# Patient Record
Sex: MALE | Race: WHITE | Employment: OTHER | URBAN - NONMETROPOLITAN AREA
[De-identification: names, ages, dates, MRNs, and addresses within clinical notes are randomized per-mention and may not be internally consistent; named-entity substitution may affect disease eponyms.]

---

## 2017-03-02 ENCOUNTER — OFFICE VISIT (OUTPATIENT)
Dept: NEUROLOGY | Age: 77
End: 2017-03-02
Payer: MEDICARE

## 2017-03-02 VITALS
HEIGHT: 72 IN | DIASTOLIC BLOOD PRESSURE: 81 MMHG | HEART RATE: 57 BPM | WEIGHT: 218.25 LBS | BODY MASS INDEX: 29.56 KG/M2 | SYSTOLIC BLOOD PRESSURE: 162 MMHG | OXYGEN SATURATION: 95 %

## 2017-03-02 DIAGNOSIS — R25.1 TREMOR: ICD-10-CM

## 2017-03-02 DIAGNOSIS — R53.83 OTHER FATIGUE: ICD-10-CM

## 2017-03-02 DIAGNOSIS — R26.9 GAIT ABNORMALITY: ICD-10-CM

## 2017-03-02 PROCEDURE — 99214 OFFICE O/P EST MOD 30 MIN: CPT | Performed by: PSYCHIATRY & NEUROLOGY

## 2017-03-02 PROCEDURE — 1123F ACP DISCUSS/DSCN MKR DOCD: CPT | Performed by: PSYCHIATRY & NEUROLOGY

## 2017-03-02 PROCEDURE — 4040F PNEUMOC VAC/ADMIN/RCVD: CPT | Performed by: PSYCHIATRY & NEUROLOGY

## 2017-03-02 PROCEDURE — G8599 NO ASA/ANTIPLAT THER USE RNG: HCPCS | Performed by: PSYCHIATRY & NEUROLOGY

## 2017-03-02 PROCEDURE — G8420 CALC BMI NORM PARAMETERS: HCPCS | Performed by: PSYCHIATRY & NEUROLOGY

## 2017-03-02 PROCEDURE — G8427 DOCREV CUR MEDS BY ELIG CLIN: HCPCS | Performed by: PSYCHIATRY & NEUROLOGY

## 2017-03-02 PROCEDURE — 4004F PT TOBACCO SCREEN RCVD TLK: CPT | Performed by: PSYCHIATRY & NEUROLOGY

## 2017-03-02 PROCEDURE — G8484 FLU IMMUNIZE NO ADMIN: HCPCS | Performed by: PSYCHIATRY & NEUROLOGY

## 2017-03-02 RX ORDER — ESCITALOPRAM OXALATE 20 MG/1
TABLET ORAL
COMMUNITY
Start: 2017-02-12

## 2017-06-06 ENCOUNTER — OFFICE VISIT (OUTPATIENT)
Dept: NEUROLOGY | Age: 77
End: 2017-06-06
Payer: MEDICARE

## 2017-06-06 VITALS
BODY MASS INDEX: 29.05 KG/M2 | OXYGEN SATURATION: 94 % | HEART RATE: 61 BPM | DIASTOLIC BLOOD PRESSURE: 91 MMHG | WEIGHT: 214.5 LBS | HEIGHT: 72 IN | SYSTOLIC BLOOD PRESSURE: 189 MMHG

## 2017-06-06 DIAGNOSIS — R53.83 OTHER FATIGUE: ICD-10-CM

## 2017-06-06 DIAGNOSIS — R26.9 GAIT ABNORMALITY: ICD-10-CM

## 2017-06-06 DIAGNOSIS — R25.1 TREMOR: ICD-10-CM

## 2017-06-06 PROCEDURE — G8420 CALC BMI NORM PARAMETERS: HCPCS | Performed by: PSYCHIATRY & NEUROLOGY

## 2017-06-06 PROCEDURE — 4004F PT TOBACCO SCREEN RCVD TLK: CPT | Performed by: PSYCHIATRY & NEUROLOGY

## 2017-06-06 PROCEDURE — 4040F PNEUMOC VAC/ADMIN/RCVD: CPT | Performed by: PSYCHIATRY & NEUROLOGY

## 2017-06-06 PROCEDURE — G8599 NO ASA/ANTIPLAT THER USE RNG: HCPCS | Performed by: PSYCHIATRY & NEUROLOGY

## 2017-06-06 PROCEDURE — 1123F ACP DISCUSS/DSCN MKR DOCD: CPT | Performed by: PSYCHIATRY & NEUROLOGY

## 2017-06-06 PROCEDURE — 99214 OFFICE O/P EST MOD 30 MIN: CPT | Performed by: PSYCHIATRY & NEUROLOGY

## 2017-06-06 PROCEDURE — G8427 DOCREV CUR MEDS BY ELIG CLIN: HCPCS | Performed by: PSYCHIATRY & NEUROLOGY

## 2018-08-11 ENCOUNTER — APPOINTMENT (OUTPATIENT)
Dept: CT IMAGING | Facility: HOSPITAL | Age: 78
End: 2018-08-11

## 2018-08-11 ENCOUNTER — APPOINTMENT (OUTPATIENT)
Dept: GENERAL RADIOLOGY | Facility: HOSPITAL | Age: 78
End: 2018-08-11

## 2018-08-11 ENCOUNTER — HOSPITAL ENCOUNTER (INPATIENT)
Facility: HOSPITAL | Age: 78
LOS: 3 days | Discharge: NURSING, REHAB OR LONG-TERM CARE | End: 2018-08-14
Attending: FAMILY MEDICINE | Admitting: INTERNAL MEDICINE

## 2018-08-11 ENCOUNTER — APPOINTMENT (OUTPATIENT)
Dept: CARDIOLOGY | Facility: HOSPITAL | Age: 78
End: 2018-08-11

## 2018-08-11 ENCOUNTER — APPOINTMENT (OUTPATIENT)
Dept: ULTRASOUND IMAGING | Facility: HOSPITAL | Age: 78
End: 2018-08-11

## 2018-08-11 ENCOUNTER — APPOINTMENT (OUTPATIENT)
Dept: MRI IMAGING | Facility: HOSPITAL | Age: 78
End: 2018-08-11

## 2018-08-11 DIAGNOSIS — Z74.09 IMPAIRED MOBILITY: ICD-10-CM

## 2018-08-11 DIAGNOSIS — Z74.09 IMPAIRED MOBILITY AND ADLS: ICD-10-CM

## 2018-08-11 DIAGNOSIS — Z78.9 IMPAIRED MOBILITY AND ADLS: ICD-10-CM

## 2018-08-11 DIAGNOSIS — I63.9 CEREBROVASCULAR ACCIDENT (CVA), UNSPECIFIED MECHANISM (HCC): Primary | ICD-10-CM

## 2018-08-11 DIAGNOSIS — R13.12 OROPHARYNGEAL DYSPHAGIA: ICD-10-CM

## 2018-08-11 LAB
ALBUMIN SERPL-MCNC: 4.5 G/DL (ref 3.5–5)
ALBUMIN/GLOB SERPL: 1.5 G/DL (ref 1.1–2.5)
ALP SERPL-CCNC: 44 U/L (ref 24–120)
ALT SERPL W P-5'-P-CCNC: 15 U/L (ref 0–54)
ANION GAP SERPL CALCULATED.3IONS-SCNC: 14 MMOL/L (ref 4–13)
APTT PPP: 27.9 SECONDS (ref 24.1–34.8)
AST SERPL-CCNC: 28 U/L (ref 7–45)
BASOPHILS # BLD AUTO: 0.05 10*3/MM3 (ref 0–0.2)
BASOPHILS NFR BLD AUTO: 0.7 % (ref 0–2)
BH CV ECHO MEAS - AO MAX PG (FULL): 4.6 MMHG
BH CV ECHO MEAS - AO MAX PG: 9 MMHG
BH CV ECHO MEAS - AO MEAN PG (FULL): 3 MMHG
BH CV ECHO MEAS - AO MEAN PG: 6 MMHG
BH CV ECHO MEAS - AO ROOT AREA (BSA CORRECTED): 1.6
BH CV ECHO MEAS - AO ROOT AREA: 9.6 CM^2
BH CV ECHO MEAS - AO ROOT DIAM: 3.5 CM
BH CV ECHO MEAS - AO V2 MAX: 150 CM/SEC
BH CV ECHO MEAS - AO V2 MEAN: 117 CM/SEC
BH CV ECHO MEAS - AO V2 VTI: 41.5 CM
BH CV ECHO MEAS - AVA(I,A): 2.3 CM^2
BH CV ECHO MEAS - AVA(I,D): 2.3 CM^2
BH CV ECHO MEAS - AVA(V,A): 2.7 CM^2
BH CV ECHO MEAS - AVA(V,D): 2.7 CM^2
BH CV ECHO MEAS - BSA(HAYCOCK): 2.2 M^2
BH CV ECHO MEAS - BSA: 2.2 M^2
BH CV ECHO MEAS - BZI_BMI: 28.5 KILOGRAMS/M^2
BH CV ECHO MEAS - BZI_METRIC_HEIGHT: 182.9 CM
BH CV ECHO MEAS - BZI_METRIC_WEIGHT: 95.3 KG
BH CV ECHO MEAS - CONTRAST EF 4CH: 61.1 ML/M^2
BH CV ECHO MEAS - EDV(CUBED): 39.3 ML
BH CV ECHO MEAS - EDV(MOD-SP4): 77.8 ML
BH CV ECHO MEAS - EDV(TEICH): 47.4 ML
BH CV ECHO MEAS - EF(CUBED): 64.8 %
BH CV ECHO MEAS - EF(MOD-SP4): 61.1 %
BH CV ECHO MEAS - EF(TEICH): 57.5 %
BH CV ECHO MEAS - ESV(CUBED): 13.8 ML
BH CV ECHO MEAS - ESV(MOD-SP4): 30.3 ML
BH CV ECHO MEAS - ESV(TEICH): 20.2 ML
BH CV ECHO MEAS - FS: 29.4 %
BH CV ECHO MEAS - IVS/LVPW: 1
BH CV ECHO MEAS - IVSD: 1.4 CM
BH CV ECHO MEAS - LA DIMENSION: 4 CM
BH CV ECHO MEAS - LA/AO: 1.1
BH CV ECHO MEAS - LAT PEAK E' VEL: 4.7 CM/SEC
BH CV ECHO MEAS - LV DIASTOLIC VOL/BSA (35-75): 35.8 ML/M^2
BH CV ECHO MEAS - LV MASS(C)D: 166.2 GRAMS
BH CV ECHO MEAS - LV MASS(C)DI: 76.4 GRAMS/M^2
BH CV ECHO MEAS - LV MAX PG: 4.4 MMHG
BH CV ECHO MEAS - LV MEAN PG: 3 MMHG
BH CV ECHO MEAS - LV SYSTOLIC VOL/BSA (12-30): 13.9 ML/M^2
BH CV ECHO MEAS - LV V1 MAX: 105 CM/SEC
BH CV ECHO MEAS - LV V1 MEAN: 77.5 CM/SEC
BH CV ECHO MEAS - LV V1 VTI: 25.4 CM
BH CV ECHO MEAS - LVIDD: 3.4 CM
BH CV ECHO MEAS - LVIDS: 2.4 CM
BH CV ECHO MEAS - LVLD AP4: 7.2 CM
BH CV ECHO MEAS - LVLS AP4: 6.3 CM
BH CV ECHO MEAS - LVOT AREA (M): 3.8 CM^2
BH CV ECHO MEAS - LVOT AREA: 3.8 CM^2
BH CV ECHO MEAS - LVOT DIAM: 2.2 CM
BH CV ECHO MEAS - LVPWD: 1.4 CM
BH CV ECHO MEAS - MED PEAK E' VEL: 5.1 CM/SEC
BH CV ECHO MEAS - MV A MAX VEL: 95.5 CM/SEC
BH CV ECHO MEAS - MV DEC TIME: 0.36 SEC
BH CV ECHO MEAS - MV E MAX VEL: 67.3 CM/SEC
BH CV ECHO MEAS - MV E/A: 0.7
BH CV ECHO MEAS - RAP SYSTOLE: 10 MMHG
BH CV ECHO MEAS - RVSP: 33.8 MMHG
BH CV ECHO MEAS - SI(AO): 183.6 ML/M^2
BH CV ECHO MEAS - SI(CUBED): 11.7 ML/M^2
BH CV ECHO MEAS - SI(LVOT): 44.4 ML/M^2
BH CV ECHO MEAS - SI(MOD-SP4): 21.8 ML/M^2
BH CV ECHO MEAS - SI(TEICH): 12.5 ML/M^2
BH CV ECHO MEAS - SV(AO): 399.3 ML
BH CV ECHO MEAS - SV(CUBED): 25.5 ML
BH CV ECHO MEAS - SV(LVOT): 96.6 ML
BH CV ECHO MEAS - SV(MOD-SP4): 47.5 ML
BH CV ECHO MEAS - SV(TEICH): 27.3 ML
BH CV ECHO MEAS - TR MAX VEL: 244 CM/SEC
BH CV ECHO MEASUREMENTS AVERAGE E/E' RATIO: 13.73
BILIRUB SERPL-MCNC: 0.6 MG/DL (ref 0.1–1)
BUN BLD-MCNC: 26 MG/DL (ref 5–21)
BUN/CREAT SERPL: 17.6 (ref 7–25)
CALCIUM SPEC-SCNC: 9.8 MG/DL (ref 8.4–10.4)
CHLORIDE SERPL-SCNC: 103 MMOL/L (ref 98–110)
CO2 SERPL-SCNC: 22 MMOL/L (ref 24–31)
CREAT BLD-MCNC: 1.48 MG/DL (ref 0.5–1.4)
DEPRECATED RDW RBC AUTO: 43.7 FL (ref 40–54)
EOSINOPHIL # BLD AUTO: 0.13 10*3/MM3 (ref 0–0.7)
EOSINOPHIL NFR BLD AUTO: 1.8 % (ref 0–4)
ERYTHROCYTE [DISTWIDTH] IN BLOOD BY AUTOMATED COUNT: 12.9 % (ref 12–15)
GFR SERPL CREATININE-BSD FRML MDRD: 46 ML/MIN/1.73
GLOBULIN UR ELPH-MCNC: 3 GM/DL
GLUCOSE BLD-MCNC: 109 MG/DL (ref 70–100)
GLUCOSE BLDC GLUCOMTR-MCNC: 130 MG/DL (ref 70–130)
GLUCOSE BLDC GLUCOMTR-MCNC: 179 MG/DL (ref 70–130)
GLUCOSE BLDC GLUCOMTR-MCNC: 264 MG/DL (ref 70–130)
HCT VFR BLD AUTO: 42.5 % (ref 40–52)
HGB BLD-MCNC: 15.2 G/DL (ref 14–18)
HOLD SPECIMEN: NORMAL
HOLD SPECIMEN: NORMAL
IMM GRANULOCYTES # BLD: 0.04 10*3/MM3 (ref 0–0.03)
IMM GRANULOCYTES NFR BLD: 0.5 % (ref 0–5)
INR PPP: 0.92 (ref 0.91–1.09)
LEFT ATRIUM VOLUME INDEX: 21.5 ML/M2
LEFT ATRIUM VOLUME: 46.8 CM3
LYMPHOCYTES # BLD AUTO: 1.77 10*3/MM3 (ref 0.72–4.86)
LYMPHOCYTES NFR BLD AUTO: 24 % (ref 15–45)
MAGNESIUM SERPL-MCNC: 2 MG/DL (ref 1.4–2.2)
MAXIMAL PREDICTED HEART RATE: 140 BPM
MCH RBC QN AUTO: 33.1 PG (ref 28–32)
MCHC RBC AUTO-ENTMCNC: 35.8 G/DL (ref 33–36)
MCV RBC AUTO: 92.6 FL (ref 82–95)
MONOCYTES # BLD AUTO: 0.91 10*3/MM3 (ref 0.19–1.3)
MONOCYTES NFR BLD AUTO: 12.3 % (ref 4–12)
NEUTROPHILS # BLD AUTO: 4.49 10*3/MM3 (ref 1.87–8.4)
NEUTROPHILS NFR BLD AUTO: 60.7 % (ref 39–78)
NRBC BLD MANUAL-RTO: 0 /100 WBC (ref 0–0)
PHOSPHATE SERPL-MCNC: 3.8 MG/DL (ref 2.5–4.5)
PLATELET # BLD AUTO: 300 10*3/MM3 (ref 130–400)
PMV BLD AUTO: 10.1 FL (ref 6–12)
POTASSIUM BLD-SCNC: 4.1 MMOL/L (ref 3.5–5.3)
PROT SERPL-MCNC: 7.5 G/DL (ref 6.3–8.7)
PROTHROMBIN TIME: 12.6 SECONDS (ref 11.9–14.6)
RBC # BLD AUTO: 4.59 10*6/MM3 (ref 4.8–5.9)
SODIUM BLD-SCNC: 139 MMOL/L (ref 135–145)
STRESS TARGET HR: 119 BPM
TROPONIN I SERPL-MCNC: 0.01 NG/ML (ref 0–0.03)
TSH SERPL DL<=0.05 MIU/L-ACNC: 3.32 MIU/ML (ref 0.47–4.68)
WBC NRBC COR # BLD: 7.39 10*3/MM3 (ref 4.8–10.8)
WHOLE BLOOD HOLD SPECIMEN: NORMAL
WHOLE BLOOD HOLD SPECIMEN: NORMAL

## 2018-08-11 PROCEDURE — 84100 ASSAY OF PHOSPHORUS: CPT | Performed by: PSYCHIATRY & NEUROLOGY

## 2018-08-11 PROCEDURE — 93880 EXTRACRANIAL BILAT STUDY: CPT

## 2018-08-11 PROCEDURE — 93005 ELECTROCARDIOGRAM TRACING: CPT | Performed by: PHYSICIAN ASSISTANT

## 2018-08-11 PROCEDURE — 85610 PROTHROMBIN TIME: CPT | Performed by: PHYSICIAN ASSISTANT

## 2018-08-11 PROCEDURE — 99285 EMERGENCY DEPT VISIT HI MDM: CPT

## 2018-08-11 PROCEDURE — 93306 TTE W/DOPPLER COMPLETE: CPT

## 2018-08-11 PROCEDURE — 85025 COMPLETE CBC W/AUTO DIFF WBC: CPT | Performed by: PHYSICIAN ASSISTANT

## 2018-08-11 PROCEDURE — 93306 TTE W/DOPPLER COMPLETE: CPT | Performed by: INTERNAL MEDICINE

## 2018-08-11 PROCEDURE — 82962 GLUCOSE BLOOD TEST: CPT

## 2018-08-11 PROCEDURE — 81003 URINALYSIS AUTO W/O SCOPE: CPT | Performed by: PSYCHIATRY & NEUROLOGY

## 2018-08-11 PROCEDURE — G8997 SWALLOW GOAL STATUS: HCPCS | Performed by: SPEECH-LANGUAGE PATHOLOGIST

## 2018-08-11 PROCEDURE — G8996 SWALLOW CURRENT STATUS: HCPCS | Performed by: SPEECH-LANGUAGE PATHOLOGIST

## 2018-08-11 PROCEDURE — 63710000001 INSULIN LISPRO (HUMAN) PER 5 UNITS: Performed by: NURSE PRACTITIONER

## 2018-08-11 PROCEDURE — 84484 ASSAY OF TROPONIN QUANT: CPT | Performed by: PHYSICIAN ASSISTANT

## 2018-08-11 PROCEDURE — 93010 ELECTROCARDIOGRAM REPORT: CPT | Performed by: INTERNAL MEDICINE

## 2018-08-11 PROCEDURE — 84443 ASSAY THYROID STIM HORMONE: CPT | Performed by: NURSE PRACTITIONER

## 2018-08-11 PROCEDURE — 71045 X-RAY EXAM CHEST 1 VIEW: CPT

## 2018-08-11 PROCEDURE — 70450 CT HEAD/BRAIN W/O DYE: CPT

## 2018-08-11 PROCEDURE — 80053 COMPREHEN METABOLIC PANEL: CPT | Performed by: PHYSICIAN ASSISTANT

## 2018-08-11 PROCEDURE — 93880 EXTRACRANIAL BILAT STUDY: CPT | Performed by: SURGERY

## 2018-08-11 PROCEDURE — 85730 THROMBOPLASTIN TIME PARTIAL: CPT | Performed by: PHYSICIAN ASSISTANT

## 2018-08-11 PROCEDURE — 83735 ASSAY OF MAGNESIUM: CPT | Performed by: PSYCHIATRY & NEUROLOGY

## 2018-08-11 PROCEDURE — 92610 EVALUATE SWALLOWING FUNCTION: CPT | Performed by: SPEECH-LANGUAGE PATHOLOGIST

## 2018-08-11 PROCEDURE — 99222 1ST HOSP IP/OBS MODERATE 55: CPT | Performed by: PSYCHIATRY & NEUROLOGY

## 2018-08-11 PROCEDURE — 70551 MRI BRAIN STEM W/O DYE: CPT

## 2018-08-11 RX ORDER — TOPIRAMATE 25 MG/1
25 TABLET ORAL NIGHTLY
Status: DISCONTINUED | OUTPATIENT
Start: 2018-08-11 | End: 2018-08-11

## 2018-08-11 RX ORDER — FENOFIBRATE 160 MG/1
160 TABLET ORAL NIGHTLY
COMMUNITY

## 2018-08-11 RX ORDER — SODIUM CHLORIDE 9 MG/ML
125 INJECTION, SOLUTION INTRAVENOUS CONTINUOUS
Status: DISCONTINUED | OUTPATIENT
Start: 2018-08-11 | End: 2018-08-11

## 2018-08-11 RX ORDER — ENALAPRILAT 2.5 MG/2ML
0.62 INJECTION INTRAVENOUS EVERY 6 HOURS PRN
Status: DISCONTINUED | OUTPATIENT
Start: 2018-08-11 | End: 2018-08-14 | Stop reason: HOSPADM

## 2018-08-11 RX ORDER — TRAZODONE HYDROCHLORIDE 150 MG/1
150 TABLET ORAL NIGHTLY
COMMUNITY

## 2018-08-11 RX ORDER — PANTOPRAZOLE SODIUM 40 MG/1
40 TABLET, DELAYED RELEASE ORAL DAILY
Status: DISCONTINUED | OUTPATIENT
Start: 2018-08-11 | End: 2018-08-14 | Stop reason: HOSPADM

## 2018-08-11 RX ORDER — ESCITALOPRAM OXALATE 20 MG/1
20 TABLET ORAL DAILY
COMMUNITY

## 2018-08-11 RX ORDER — ATORVASTATIN CALCIUM 40 MG/1
80 TABLET, FILM COATED ORAL NIGHTLY
Status: DISCONTINUED | OUTPATIENT
Start: 2018-08-11 | End: 2018-08-14 | Stop reason: HOSPADM

## 2018-08-11 RX ORDER — CLOPIDOGREL BISULFATE 75 MG/1
75 TABLET ORAL DAILY
COMMUNITY

## 2018-08-11 RX ORDER — METOPROLOL SUCCINATE 25 MG/1
25 TABLET, EXTENDED RELEASE ORAL NIGHTLY
COMMUNITY
End: 2018-08-14 | Stop reason: HOSPADM

## 2018-08-11 RX ORDER — ASPIRIN 81 MG/1
81 TABLET, CHEWABLE ORAL DAILY
COMMUNITY
End: 2018-11-04 | Stop reason: HOSPADM

## 2018-08-11 RX ORDER — HYDRALAZINE HYDROCHLORIDE 25 MG/1
25 TABLET, FILM COATED ORAL 2 TIMES DAILY
COMMUNITY

## 2018-08-11 RX ORDER — PRAVASTATIN SODIUM 40 MG
40 TABLET ORAL NIGHTLY
COMMUNITY
End: 2018-08-14 | Stop reason: HOSPADM

## 2018-08-11 RX ORDER — SODIUM CHLORIDE 0.9 % (FLUSH) 0.9 %
1-10 SYRINGE (ML) INJECTION AS NEEDED
Status: DISCONTINUED | OUTPATIENT
Start: 2018-08-11 | End: 2018-08-14 | Stop reason: HOSPADM

## 2018-08-11 RX ORDER — SODIUM CHLORIDE 9 MG/ML
50 INJECTION, SOLUTION INTRAVENOUS CONTINUOUS
Status: DISCONTINUED | OUTPATIENT
Start: 2018-08-11 | End: 2018-08-13

## 2018-08-11 RX ORDER — PANTOPRAZOLE SODIUM 40 MG/1
40 TABLET, DELAYED RELEASE ORAL DAILY
COMMUNITY

## 2018-08-11 RX ORDER — ESCITALOPRAM OXALATE 10 MG/1
20 TABLET ORAL DAILY
Status: DISCONTINUED | OUTPATIENT
Start: 2018-08-11 | End: 2018-08-14 | Stop reason: HOSPADM

## 2018-08-11 RX ORDER — OLMESARTAN MEDOXOMIL 40 MG/1
40 TABLET ORAL DAILY
COMMUNITY

## 2018-08-11 RX ORDER — TRAZODONE HYDROCHLORIDE 150 MG/1
150 TABLET ORAL NIGHTLY
Status: DISCONTINUED | OUTPATIENT
Start: 2018-08-11 | End: 2018-08-14 | Stop reason: HOSPADM

## 2018-08-11 RX ORDER — TOPIRAMATE 25 MG/1
25 TABLET ORAL NIGHTLY
Status: ON HOLD | COMMUNITY
End: 2018-08-11

## 2018-08-11 RX ORDER — ASPIRIN 81 MG/1
81 TABLET, CHEWABLE ORAL DAILY
Status: DISCONTINUED | OUTPATIENT
Start: 2018-08-11 | End: 2018-08-14 | Stop reason: HOSPADM

## 2018-08-11 RX ORDER — CAPTOPRIL 25 MG/1
25 TABLET ORAL 3 TIMES DAILY
Status: ON HOLD | COMMUNITY
End: 2018-08-14

## 2018-08-11 RX ORDER — HYDRALAZINE HYDROCHLORIDE 25 MG/1
25 TABLET, FILM COATED ORAL 2 TIMES DAILY
Status: DISCONTINUED | OUTPATIENT
Start: 2018-08-11 | End: 2018-08-11

## 2018-08-11 RX ORDER — DEXTROSE MONOHYDRATE 25 G/50ML
25 INJECTION, SOLUTION INTRAVENOUS
Status: DISCONTINUED | OUTPATIENT
Start: 2018-08-11 | End: 2018-08-14 | Stop reason: HOSPADM

## 2018-08-11 RX ORDER — CLOPIDOGREL BISULFATE 75 MG/1
75 TABLET ORAL DAILY
Status: DISCONTINUED | OUTPATIENT
Start: 2018-08-11 | End: 2018-08-14 | Stop reason: HOSPADM

## 2018-08-11 RX ORDER — NICOTINE POLACRILEX 4 MG
15 LOZENGE BUCCAL
Status: DISCONTINUED | OUTPATIENT
Start: 2018-08-11 | End: 2018-08-14 | Stop reason: HOSPADM

## 2018-08-11 RX ORDER — FENOFIBRATE 145 MG/1
145 TABLET, COATED ORAL DAILY
Status: DISCONTINUED | OUTPATIENT
Start: 2018-08-11 | End: 2018-08-14 | Stop reason: HOSPADM

## 2018-08-11 RX ORDER — LOSARTAN POTASSIUM 50 MG/1
100 TABLET ORAL
Status: DISCONTINUED | OUTPATIENT
Start: 2018-08-11 | End: 2018-08-14 | Stop reason: HOSPADM

## 2018-08-11 RX ADMIN — INSULIN LISPRO 2 UNITS: 100 INJECTION, SOLUTION INTRAVENOUS; SUBCUTANEOUS at 17:00

## 2018-08-11 RX ADMIN — ASPIRIN 81 MG 81 MG: 81 TABLET ORAL at 16:58

## 2018-08-11 RX ADMIN — TRAZODONE HYDROCHLORIDE 150 MG: 150 TABLET, FILM COATED ORAL at 20:50

## 2018-08-11 RX ADMIN — ATORVASTATIN CALCIUM 80 MG: 40 TABLET, FILM COATED ORAL at 20:50

## 2018-08-11 RX ADMIN — CARBIDOPA AND LEVODOPA 2 TABLET: 25; 100 TABLET ORAL at 20:50

## 2018-08-11 RX ADMIN — CARBIDOPA AND LEVODOPA 2 TABLET: 25; 100 TABLET ORAL at 17:00

## 2018-08-11 RX ADMIN — INSULIN LISPRO 6 UNITS: 100 INJECTION, SOLUTION INTRAVENOUS; SUBCUTANEOUS at 20:54

## 2018-08-11 RX ADMIN — CLOPIDOGREL 75 MG: 75 TABLET, FILM COATED ORAL at 16:59

## 2018-08-11 RX ADMIN — ESCITALOPRAM 20 MG: 10 TABLET, FILM COATED ORAL at 16:59

## 2018-08-11 RX ADMIN — SODIUM CHLORIDE 125 ML/HR: 9 INJECTION, SOLUTION INTRAVENOUS at 11:13

## 2018-08-11 RX ADMIN — FENOFIBRATE 145 MG: 145 TABLET ORAL at 16:58

## 2018-08-11 RX ADMIN — PANTOPRAZOLE SODIUM 40 MG: 40 TABLET, DELAYED RELEASE ORAL at 16:58

## 2018-08-11 NOTE — ED NOTES
Pt reports numbness and tingling started last night around midnight. Nurse states that at 0730 this morning pt was able to dress himself and transfer. At 0830 pt took his morning medications, after medications left arm and leg went flacid.      Too Marquis, RN  08/11/18 [id number]

## 2018-08-11 NOTE — PLAN OF CARE
Problem: Patient Care Overview  Goal: Plan of Care Review  Outcome: Ongoing (interventions implemented as appropriate)   08/11/18 1431   Coping/Psychosocial   Plan of Care Reviewed With patient   Plan of Care Review   Progress improving   OTHER   Outcome Summary Bedside Swallow Evaluation completed. Patient with audible effortful swallow and cough with thin liquids. No other overt s/s of aspiration seen with full range of consistencies. Speech appears functional. Rec: 1) Mech soft diet with thin liquids 2) Water between meals 3) ST to follow

## 2018-08-11 NOTE — H&P
[facility] Medicine Services  HISTORY AND PHYSICAL    Date of Admission: 8/11/2018  Primary Care Physician: Sergio Ma MD    Subjective     Chief Complaint: Left-sided weakness    History of Present Illness  The patient is a 78-year-old  male who presented to [facility] emergency department complaining of left-sided weakness.  The patient has past medical history significant for a previous cerebrovascular accident, diabetes on insulin therapy, hyperlipidemia, hypertension, Parkinson's disease, depression and GERD.  It also appears that he has a form of chronic kidney disease however unsure of the stage at this point.  He previously had a stroke almost 2 years ago and suffered a left-sided weakness.  Since that time he has regained some use of his left upper and lower extremities.  He apparently suffered a fall on Sunday last week and was hospitalized at [facility] in [place], [place].  He was discharged yesterday from [facility] to [facility] nursing and rehabilitation.  The patient states that he developed increased weakness to his left side and increased numbness at around midnight and it progressively worsened throughout the night.  Her the nursing staff at [facility], the nurse's aide was able to dress him at 7:30 without any difficulty.  Then at 8:30 this morning he had profound left-sided facial weakness and drooling.  Per reports he was unable to chew his food and it was falling out of his mouth.  However the patient insists that it started at midnight, therefore he was out of the window for TPA since his onset was greater than 3-4 hours.  The patient does appear to be of sound mind and body at this time.  He does get a little confused about specifics when he is answering questions.  His granddaughter states that he falls a lot.  Wife, daughter and son-in-law are at the bedside.  The patient denies  any shortness of breath, chest pain, nausea, vomiting or diarrhea.  His neurological exam reveals a flaccid left upper and left lower extremity.  He also has decreased sensation on that left side as well.    Review of Systems   Constitutional: Positive for activity change and fatigue. Negative for appetite change, chills and fever.   HENT: Negative for hearing loss, nosebleeds, tinnitus and trouble swallowing.    Eyes: Negative for visual disturbance.   Respiratory: Negative for cough, chest tightness, shortness of breath and wheezing.    Cardiovascular: Negative for chest pain, palpitations and leg swelling.   Gastrointestinal: Negative for abdominal distention, abdominal pain, blood in stool, constipation, diarrhea, nausea and vomiting.   Endocrine: Negative for cold intolerance, heat intolerance, polydipsia, polyphagia and polyuria.   Genitourinary: Positive for urgency. Negative for decreased urine volume, difficulty urinating, dysuria, flank pain, frequency and hematuria.   Musculoskeletal: Negative for arthralgias, joint swelling and myalgias.   Skin: Negative for rash.   Allergic/Immunologic: Negative for immunocompromised state.   Neurological: Positive for weakness (left side flaccid) and numbness (left upper and lower extremities ). Negative for dizziness, tremors, syncope, light-headedness and headaches.   Hematological: Negative for adenopathy. Does not bruise/bleed easily.   Psychiatric/Behavioral: Negative for confusion and sleep disturbance. The patient is not nervous/anxious.       Otherwise complete ROS reviewed and negative except as mentioned in the HPI.    Past Medical History:   Past Medical History:   Diagnosis Date   • Arthritis    • CVA (cerebral vascular accident) (CMS/[facility])    • Depressive disorder    • Diabetes mellitus (CMS/[facility])    • GERD (gastroesophageal reflux disease)    • Hemiplegia (CMS/[facility])    • Hyperlipidemia    • Hypertension    • Parkinson's disease (CMS/[facility])    • Renal disorder       Past Surgical History:No past surgical history on file.     Social History:  reports that he has never smoked. He has quit using smokeless tobacco. His smokeless tobacco use included Chew. He reports that he does not drink alcohol or use drugs.    Family History: family history includes Alcohol abuse in his father; Alzheimer's disease in his mother.      Allergies:  No Known Allergies  Medications:  Prior to Admission medications    Medication Sig Start Date End Date Taking? Authorizing Provider   aspirin 81 MG chewable tablet Chew 81 mg Daily.   Yes Denise Smith MD   carbidopa-levodopa (SINEMET)  MG per tablet Take 2 tablets by mouth 3 (Three) Times a Day.   Yes Denise Smith MD   clopidogrel (PLAVIX) 75 MG tablet Take 75 mg by mouth Daily.   Yes Denise Smith MD   escitalopram (LEXAPRO) 20 MG tablet Take 20 mg by mouth Daily.   Yes Denise Smith MD   fenofibrate (TRIGLIDE) 160 MG tablet Take 160 mg by mouth Every Night.   Yes Deinse Smith MD   hydrALAZINE (APRESOLINE) 25 MG tablet Take 25 mg by mouth 2 (Two) Times a Day.   Yes Denise Smith MD   insulin detemir (LEVEMIR) 100 UNIT/ML injection Inject 30 Units under the skin into the appropriate area as directed Every Night.   Yes Denise Smith MD   insulin glulisine (APIDRA) 100 UNIT/ML patient supplied pump Inject 35 Units under the skin into the appropriate area as directed Continuous.   Yes Denise Smith MD   olmesartan (BENICAR) 20 MG tablet Take 40 mg by mouth Daily.   Yes Denise Smith MD   pantoprazole (PROTONIX) 40 MG EC tablet Take 40 mg by mouth Daily.   Yes Denise Smith MD   pravastatin (PRAVACHOL) 40 MG tablet Take 40 mg by mouth Every Night.   Yes Denise Smith MD   topiramate (TOPAMAX) 25 MG tablet Take 25 mg by mouth Every Night.   Yes Denise Smith MD   traZODone (DESYREL) 150 MG tablet Take 150 mg by mouth Every Night.   Yes Sarah  "MD Denise     Objective     Vital Signs: /60   Pulse 52   Temp 97.1 °F (36.2 °C) (Temporal Artery )   Resp 19   Ht 182.9 cm (72\")   Wt 102 kg (225 lb)   SpO2 96%   BMI 30.52 kg/m²      Physical Exam   Constitutional: He is oriented to person, place, and time. He appears well-developed and well-nourished.   Obese   HENT:   Head: Normocephalic and atraumatic.   Eyes: Pupils are equal, round, and reactive to light. Conjunctivae and EOM are normal.   Neck: Neck supple. No JVD present. No thyromegaly present.   Cardiovascular: Regular rhythm, normal heart sounds and intact distal pulses.  Bradycardia present.  Exam reveals no gallop and no friction rub.    No murmur heard.  Sinus kaela 54   Pulmonary/Chest: Effort normal and breath sounds normal. No respiratory distress. He has no wheezes. He has no rales. He exhibits no tenderness.   Diminished bilateral bases   Abdominal: Soft. Bowel sounds are normal. He exhibits distension. There is no tenderness. There is no rebound and no guarding.   Musculoskeletal: He exhibits no edema, tenderness or deformity.   Lymphadenopathy:     He has no cervical adenopathy.   Neurological: He is alert and oriented to person, place, and time. A sensory deficit (left side) is present. No cranial nerve deficit.   Left upper and lower extremity flaccid   Skin: Skin is warm and dry. No rash noted.   Psychiatric: He has a normal mood and affect. His behavior is normal. Judgment and thought content normal.   Nursing note and vitals reviewed.    Results Reviewed:  Lab Results (last 24 hours)     Procedure Component Value Units Date/Time    [place] Draw [[id number]] Collected:  08/11/18 0958    Specimen:  Blood Updated:  08/11/18 1100    Narrative:       The following orders were created for panel order [place] Draw.  Procedure                               Abnormality         Status                     ---------                               -----------         ------              "        Light Blue Top[[id number]]                                   Final result               Green Top (Gel)[[id number]]                                  Final result               Lavender Top[[id number]]                                     Final result               Red Top[[id number]]                                          Final result                 Please view results for these tests on the individual orders.    Light Blue Top [[id number]] Collected:  08/11/18 0958    Specimen:  Blood from Arm, Left Updated:  08/11/18 1100     Extra Tube hold for add-on     Comment: Auto resulted       Green Top (Gel) [[id number]] Collected:  08/11/18 0958    Specimen:  Blood from Arm, Left Updated:  08/11/18 1100     Extra Tube Hold for add-ons.     Comment: Auto resulted.       Lavender Top [[id number]] Collected:  08/11/18 0958    Specimen:  Blood from Arm, Left Updated:  08/11/18 1100     Extra Tube hold for add-on     Comment: Auto resulted       Red Top [[id number]] Collected:  08/11/18 0958    Specimen:  Blood from Arm, Left Updated:  08/11/18 1100     Extra Tube Hold for add-ons.     Comment: Auto resulted.       aPTT [[id number]]  (Normal) Collected:  08/11/18 0958    Specimen:  Blood from Arm, Left Updated:  08/11/18 1048     PTT 27.9 seconds     Protime-INR [[id number]]  (Normal) Collected:  08/11/18 0958    Specimen:  Blood from Arm, Left Updated:  08/11/18 1048     Protime 12.6 Seconds      INR 0.92    Troponin [[id number]]  (Normal) Collected:  08/11/18 0958    Specimen:  Blood from Arm, Left Updated:  08/11/18 1035     Troponin I 0.015 ng/mL     Comprehensive Metabolic Panel [[id number]]  (Abnormal) Collected:  08/11/18 0958    Specimen:  Blood from Arm, Left Updated:  08/11/18 1025     Glucose 109 (H) mg/dL      BUN 26 (H) mg/dL      Creatinine 1.48 (H) mg/dL      Sodium 139 mmol/L      Potassium 4.1 mmol/L      Chloride 103 mmol/L      CO2 22.0 (L) mmol/L      Calcium 9.8 mg/dL      Total Protein 7.5 g/dL      Albumin  4.50 g/dL      ALT (SGPT) 15 U/L      AST (SGOT) 28 U/L      Alkaline Phosphatase 44 U/L      Total Bilirubin 0.6 mg/dL      eGFR Non African Amer 46 (L) mL/min/1.73      Globulin 3.0 gm/dL      A/G Ratio 1.5 g/dL      BUN/Creatinine Ratio 17.6     Anion Gap 14.0 (H) mmol/L     Narrative:       The MDRD GFR formula is only valid for adults with stable renal function between ages 18 and 70.    CBC & Differential [[id number]] Collected:  08/11/18 0958    Specimen:  Blood Updated:  08/11/18 1012    Narrative:       The following orders were created for panel order CBC & Differential.  Procedure                               Abnormality         Status                     ---------                               -----------         ------                     CBC Auto Differential[[id number]]        Abnormal            Final result                 Please view results for these tests on the individual orders.    CBC Auto Differential [[id number]]  (Abnormal) Collected:  08/11/18 0958    Specimen:  Blood from Arm, Left Updated:  08/11/18 1012     WBC 7.39 10*3/mm3      RBC 4.59 (L) 10*6/mm3      Hemoglobin 15.2 g/dL      Hematocrit 42.5 %      MCV 92.6 fL      MCH 33.1 (H) pg      MCHC 35.8 g/dL      RDW 12.9 %      RDW-SD 43.7 fl      MPV 10.1 fL      Platelets 300 10*3/mm3      Neutrophil % 60.7 %      Lymphocyte % 24.0 %      Monocyte % 12.3 (H) %      Eosinophil % 1.8 %      Basophil % 0.7 %      Immature Grans % 0.5 %      Neutrophils, Absolute 4.49 10*3/mm3      Lymphocytes, Absolute 1.77 10*3/mm3      Monocytes, Absolute 0.91 10*3/mm3      Eosinophils, Absolute 0.13 10*3/mm3      Basophils, Absolute 0.05 10*3/mm3      Immature Grans, Absolute 0.04 (H) 10*3/mm3      nRBC 0.0 /100 WBC     POC Glucose Once [[id number]]  (Normal) Collected:  08/11/18 0947    Specimen:  Blood Updated:  08/11/18 1006     Glucose 130 mg/dL      Comment: : [id number] Stanley Laura LMeter ID: [id number]           Imaging Results (last 24  hours)     Procedure Component Value Units Date/Time    CT Head Without Contrast [[id number]] Collected:  08/11/18 1044     Updated:  08/11/18 1048    Narrative:       EXAMINATION:   CT HEAD WO CONTRAST-  8/11/2018 10:44 AM CDT     CT BRAIN without contrast 8/11/2018 10:25 AM CDT     HISTORY: Weakness     COMPARISON: None      DLP: 661 mGy cm     TECHNIQUE: Serial axial tomographic images of the brain were obtained  without the use of intravenous contrast.      FINDINGS:   The midline structures are nondisplaced. There is moderate cerebral and  cerebellar volume loss, with an associated increase in the prominence of  the ventricles and sulci. The basilar cisterns are normal in size and  configuration. There is no evidence of intracranial hemorrhage or  mass-effect. There is low attenuation in the periventricular white  matter, consistent with chronic ischemic change. There are no abnormal  extra-axial fluid collections. There is no evidence of tonsillar  herniation.      The included orbits and their contents are unremarkable. The visualized  paranasal sinuses, mastoid air cells and middle ear cavities are clear.  The visualized osseous structures and overlying soft tissues of the  skull and face are intact.        Impression:       Changes of aging with no acute intracranial abnormality  This report was finalized on 08/11/2018 10:45 by Dr. Cesar Rodriguez MD.    XR Chest 1 View [[id number]] Collected:  08/11/18 1020     Updated:  08/11/18 1023    Narrative:       EXAMINATION:   XR CHEST 1 VW-  8/11/2018 10:20 AM CDT     HISTORY: Left-sided weakness     Frontal upright radiograph of the chest 8/11/2018 10:18 AM CDT     COMPARISON: None.     FINDINGS:   The lungs are clear. The cardiomediastinal silhouette and pulmonary  vascularity are within normal limits.      The osseous structures and surrounding soft tissues demonstrate no acute  abnormality.       Impression:       1. No radiographic evidence of acute  cardiopulmonary process.        This report was finalized on 08/11/2018 10:20 by Dr. Cesar Rodriguez MD.        I have personally reviewed and interpreted the radiology studies and ECG obtained at time of admission.     Assessment / Plan     Assessment/Plan:  1.  Transient Ischemic attach suspected cerebral vascular accident   - Consult Neurology   - MRI brain without contrast STAT   - 2D echocardiogram with bubble study   - Bilateral Carotid Duplex   - PT/OT   - Speech evaluation, failed RN bedside swallow   - Check Lipid profile   - Check HghA1c   - Resume ASA and Plavix   - Hold Lovenox for DVT prophylaxis     2.  Hyperlipidemia   - Check Lipid profile   - Lipitor 80 mg nightly   - Continue Tricor    3.  Insulin dependent diabetes mellitus, type II   - Check HgbA1c   - Hold Insulin   - Accu checks ac/hs   - Currently NPO    4.  Parkinson's Disease   - Continue Sinemet     5.  Hypertension   - Resume ARB, Losartan substituted for Benicar per formulary    - Hydralazine 25 mg BID    6.  Chronic kidney disease, stage III   - NS @ 100 ml/hr    - Recheck BMP in AM    7.  Gastroesophageal Reflux disease   - Protonix    8.  Depression   - Resume Trazodone and Lexapro    Code Status: Full     I discussed the patient's findings and my recommendations with the patient, family and Dr. Martinez.    Estimated length of stay 2-3 days.    Mick Francisco, BRENNA   08/11/18   12:34 PM

## 2018-08-11 NOTE — THERAPY EVALUATION
Acute Care - Speech Language Pathology   Swallow Initial Evaluation [facility]     Patient Name: Angelika Sapp  : 1940  MRN: [id number]  Today's Date: 2018               Admit Date: 2018    Bedside Swallow Evaluation completed. Patient with audible effortful swallow and cough with thin liquids. No other overt s/s of aspiration seen with full range of consistencies. Speech appears functional. Rec: 1) [facility] soft diet with thin liquids 2) Water between meals 3) ST to follow   Aubree Evans MS CCC-SLP 2018 2:35 PM    Visit Dx:     ICD-10-CM ICD-9-CM   1. Cerebrovascular accident (CVA), unspecified mechanism (CMS/HCC) I63.9 434.91   2. Oropharyngeal dysphagia R13.12 787.22     Patient Active Problem List   Diagnosis   • Cerebrovascular accident (CVA) (CMS/HCC)     Past Medical History:   Diagnosis Date   • Arthritis    • CVA (cerebral vascular accident) (CMS/HCC)    • Depressive disorder    • Diabetes mellitus (CMS/HCC)    • GERD (gastroesophageal reflux disease)    • Hemiplegia (CMS/HCC)    • Hyperlipidemia    • Hypertension    • Parkinson's disease (CMS/HCC)    • Renal disorder      History reviewed. No pertinent surgical history.       SWALLOW EVALUATION (last 72 hours)      SLP Adult Swallow Evaluation     Row Name 18 5950                   Rehab Evaluation    Document Type evaluation  -KW        Subjective Information no complaints  -KW        Patient Observations alert;cooperative  -KW        Patient Effort good  -KW           General Information    Patient Profile Reviewed yes  -KW        Pertinent History Of Current Problem CVA, L side weakness  -KW        Current Method of Nutrition NPO  -KW        Precautions/Limitations, Vision WFL  -KW        Precautions/Limitations, Hearing WFL  -KW        Prior Level of Function-Communication WFL  -KW        Prior Level of Function-Swallowing no diet consistency restrictions  -KW        Plans/Goals Discussed with patient;spouse/S.O.  -KW         Barriers to Rehab none identified  -KW        Patient's Goals for Discharge return to all previous roles/activities  -KW        Family Goals for Discharge patient able to return to all previous activities/roles  -KW           Pain Assessment    Additional Documentation Pain Scale: FACES Pre/Post-Treatment (Group)  -KW           Pain Scale: FACES Pre/Post-Treatment    Pain: FACES Scale, Pretreatment 0-->no hurt  -KW        Pain: FACES Scale, Post-Treatment 0-->no hurt  -KW           Oral Motor and Function    Dentition Assessment upper dentures/partial in place  -KW        Secretion Management WNL/WFL  -KW        Mucosal Quality moist, healthy  -KW        Volitional Swallow WFL  -KW        Volitional Cough WFL  -KW           Oral Musculature and Cranial Nerve Assessment    Oral Motor General Assessment oral labial or buccal impairment;lingual impairment  -KW        Oral Labial or Buccal Impairment, Detail, Cranial Nerve VII (Facial): left labial droop  -KW        Lingual Impairment, Detail. Cranial Nerves IX, XII (Glossopharyngeal and Hypoglossal) reduced strength left  -KW        Oral Motor, Comment minimal and not visible at rest  -KW           General Eating/Swallowing Observations    Respiratory Support Currently in Use nasal cannula  -KW        Eating/Swallowing Skills self-fed  -KW        Positioning During Eating upright in bed  -KW        Utensils Used spoon;straw  -KW        Consistencies Trialed regular textures;thin liquids;nectar/syrup-thick liquids;honey-thick liquids;pudding thick  -KW           Clinical Swallow Eval    Oral Prep Phase WFL  -KW        Oral Transit WFL  -KW        Oral Residue WFL  -KW        Pharyngeal Phase suspected pharyngeal impairment  -KW        Esophageal Phase unremarkable  -KW        Clinical Swallow Evaluation Summary Bedside Swallow Evaluation completed.  Patient with audible effortful swallow and cough with thin liquids.  No other overt s/s of aspiration seen with full  range of consistencies.  Speech appears functional.  Rec: 1) [facility] soft diet with thin liquids 2) Water between meals 3) ST to follow   -KW           Pharyngeal Phase Concerns    Pharyngeal Phase Concerns multiple swallows;cough  -KW        Multiple Swallows thin  -KW        Cough thin  -KW           Clinical Impression    SLP Swallowing Diagnosis mild;oral dysfunction;pharyngeal dysfunction  -KW        Functional Impact risk of aspiration/pneumonia  -KW        Rehab Potential/Prognosis, Swallowing good, to achieve stated therapy goals  -KW        Criteria for Skilled Therapeutic Interventions Met demonstrates skilled criteria  -KW           Recommendations    Therapy Frequency (Swallow) at least;3 days per week  -KW        Predicted Duration Therapy Intervention (Days) until discharge  -KW        SLP Diet Recommendation soft textures;ground;nectar thick liquids;water between meals after oral care, with supervision  -KW        Recommended Precautions and Strategies upright posture during/after eating;small bites of food and sips of liquid  -KW        SLP Rec. for Method of Medication Administration meds whole;with thick liquids;with pudding or applesauce  -KW        Monitor for Signs of Aspiration yes;cough;pneumonia;right lower lobe infiltrates  -KW        Anticipated Dischage Disposition inpatient rehabilitation facility  -KW           Swallow Goals (SLP)    Oral Nutrition/Hydration Goal Selection (SLP) oral nutrition/hydration, SLP goal 1  -KW        Pharyngeal Strengthening Exercise Goal Selection (SLP) pharyngeal strengthening exercise, SLP goal 1  -KW        Additional Documentation pharyngeal strengthening exercise goal selection (SLP)  -KW           Oral Nutrition/Hydration Goal 1 (SLP)    Oral Nutrition/Hydration Goal 1, SLP LTG: Patient will tolerate LRD with no overt s/s of aspiration.  -KW        Time Frame (Oral Nutrition/Hydration Goal 1, SLP) by discharge  -KW        Barriers (Oral Nutrition/Hydration  Goal 1, SLP) n/a  -KW        Progress/Outcomes (Oral Nutrition/Hydration Goal 1, SLP) goal ongoing  -KW           Pharyngeal Strengthening Exercise Goal 1 (SLP)    Activity (Pharyngeal Strengthening Goal 1, SLP) increase timing;increase epiglottic inversion and retroflexion;increase tongue base retraction  -KW        Increase Timing gustatory stimulation (sour/cold)  -KW        Increase Epiglottic Inversion and Retroflexion Mendelsohn;falsetto  -KW        Increase Tongue Base Retraction hard effortful swallow;imani  -KW        [place]/Accuracy (Pharyngeal Strengthening Goal 1, SLP) independently (over 90% accuracy)  -KW        Time Frame (Pharyngeal Strengthening Goal 1, SLP) short term goal (STG);by discharge  -KW        Barriers (Pharyngeal Strengthening Goal 1, SLP) n/a  -KW        Progress/Outcomes (Pharyngeal Strengthening Goal 1, SLP) goal ongoing  -KW          User Key  (r) = Recorded By, (t) = Taken By, (c) = Cosigned By    Initials Name Effective Dates    Aubree Cintron MS [facility]-SLP 08/02/16 -         EDUCATION  The patient has been educated in the following areas:   Dysphagia (Swallowing Impairment).    SLP Recommendation and Plan  SLP Swallowing Diagnosis: mild, oral dysfunction, pharyngeal dysfunction  SLP Diet Recommendation: soft textures, ground, nectar thick liquids, water between meals after oral care, with supervision  Recommended Precautions and Strategies: upright posture during/after eating, small bites of food and sips of liquid     Monitor for Signs of Aspiration: yes, cough, pneumonia, right lower lobe infiltrates     Criteria for Skilled Therapeutic Interventions Met: demonstrates skilled criteria  Anticipated Dischage Disposition: inpatient rehabilitation facility  Rehab Potential/Prognosis, Swallowing: good, to achieve stated therapy goals  Therapy Frequency (Swallow): at least, 3 days per week  Predicted Duration Therapy Intervention (Days): until discharge       Plan of Care  Reviewed With: patient  Plan of Care Review  Plan of Care Reviewed With: patient  Progress: improving  Outcome Summary: Bedside Swallow Evaluation completed. Patient with audible effortful swallow and cough with thin liquids. No other overt s/s of aspiration seen with full range of consistencies. Speech appears functional. Rec: 1) Mech soft diet with thin liquids 2) Water between meals 3) ST to follow           SLP GOALS     Row Name 08/11/18 1330             Oral Nutrition/Hydration Goal 1 (SLP)    Oral Nutrition/Hydration Goal 1, SLP LTG: Patient will tolerate LRD with no overt s/s of aspiration.  -KW      Time Frame (Oral Nutrition/Hydration Goal 1, SLP) by discharge  -KW      Barriers (Oral Nutrition/Hydration Goal 1, SLP) n/a  -KW      Progress/Outcomes (Oral Nutrition/Hydration Goal 1, SLP) goal ongoing  -KW         Pharyngeal Strengthening Exercise Goal 1 (SLP)    Activity (Pharyngeal Strengthening Goal 1, SLP) increase timing;increase epiglottic inversion and retroflexion;increase tongue base retraction  -KW      Increase Timing gustatory stimulation (sour/cold)  -KW      Increase Epiglottic Inversion and Retroflexion Mendelsohn;falsetto  -KW      Increase Tongue Base Retraction hard effortful swallow;imani  -KW      [place]/Accuracy (Pharyngeal Strengthening Goal 1, SLP) independently (over 90% accuracy)  -KW      Time Frame (Pharyngeal Strengthening Goal 1, SLP) short term goal (STG);by discharge  -KW      Barriers (Pharyngeal Strengthening Goal 1, SLP) n/a  -KW      Progress/Outcomes (Pharyngeal Strengthening Goal 1, SLP) goal ongoing  -KW        User Key  (r) = Recorded By, (t) = Taken By, (c) = Cosigned By    Initials Name Provider Type    Aubree Cintron MS CCC-SLP Speech and Language Pathologist             SLP Outcome Measures (last 72 hours)      SLP Outcome Measures     Row Name 08/11/18 1400             SLP Outcome Measures    Outcome Measure Used? Adult NOMS  -KW         Adult FCM  Scores    FCM Chosen Swallowing  -      Swallowing FCM Score 5  -KW        User Key  (r) = Recorded By, (t) = Taken By, (c) = Cosigned By    Initials Name Effective Dates    Aubree Cintron MS CCC-SLP 08/02/16 -            Time Calculation:         Time Calculation- SLP     Row Name 08/11/18 1434             Time Calculation- SLP    SLP Start Time 1330  -KW      SLP Stop Time 1415  -KW      SLP Time Calculation (min) 45 min  -KW      SLP Received On 08/11/18  -KW      SLP Goal Re-Cert Due Date 08/21/18  -        User Key  (r) = Recorded By, (t) = Taken By, (c) = Cosigned By    Initials Name Provider Type    Aubree Cintron MS CCC-SLP Speech and Language Pathologist          Therapy Charges for Today     Code Description Service Date Service Provider Modifiers Qty    56429191412 HC ST SWALLOWING CURRENT STATUS 8/11/2018 Aubree Evans MS CCC-SLP GN, CJ 1    27680788293 HC ST SWALLOWING PROJECTED 8/11/2018 Aubree Evans MS CCC-SLP GN, CI 1    89007361397 HC ST EVAL ORAL PHARYNG SWALLOW 3 8/11/2018 Aubree Evans MS CCC-SLP GN 1          SLP G-Codes  SLP NOMS Used?: Yes  Functional Limitations: Swallowing  Swallow Current Status (): At least 20 percent but less than 40 percent impaired, limited or restricted  Swallow Goal Status (): At least 1 percent but less than 20 percent impaired, limited or restricted    Aubree Evans MS CCC-RITU  8/11/2018

## 2018-08-11 NOTE — CONSULTS
"Neurology Consult Note    Referring Provider: Dr. Martinez  Reason for Consultation: Stroke      History of present illness:      Patient is an 78-year-old  male with past medical history of Parkinson's disease, hypertension, hyperlipidemia, ischemic stroke 2 years ago with residual left-sided weakness.  Since his stroke he has a fair amount of left leg weakness but is usually able to use his left hand in some capacity.  At baseline he is usually able to ambulate with a cane.  He was admitted to [facility] approximately 2 weeks ago with a worsening of left arm and leg weakness and numbness him a possibly in the setting of a fall, and was diagnosed with a stroke at that time.  I do not have the results of that workup currently.  His wife notes that the \"changed around his medications over there\".  He was discharged yesterday to [facility].  He is currently on aspirin 81 mg daily and Plavix 75 mg daily.  His wife notes that last evening he had difficulty chewing food on the left side in the food was falling out of his mouth.  The patient notes that starting at around midnight he noticed a worsening of his left-sided weakness and numbness initially in his leg but also involving his arm.  It appeared to progress throughout the night.  He was brought in to our ED at about 10 AM after his age had to feed him at 8:30 and found that he had drooling and profound left-sided facial weakness.  Per the report of the nursing home staff, the nurses aide was able to dress him without difficulty at 7:30 AM.  I was contacted by the ED staff about this patient at 10:12 AM to evaluate him for possible TPA.  At that time the patient had reported that his symptoms had started at around midnight the night before presentation, and was therefore outside any treatment window for TPA.  It is notable that his current symptoms are in fact a worsening of his previous stroke symptoms from " approximately 2 weeks ago, with the addition of the left facial weakness, which was apparently less prominent or absent at bedtime.  This is further corroborated by his wife's report of symptoms starting even earlier than that, at dinner yesterday.  Head CT was obtained which showed a chronic right basal ganglia infarct extending into the right periventricular white matter, and was negative for any acute abnormalities.     He denies any recent fevers, sweats, constitutional symptoms, or other changes in his health recently.  Of note a creatinine is elevated at 1.48.  No baseline information is available presently.    Past Medical History:   Diagnosis Date   • Arthritis    • CVA (cerebral vascular accident) (CMS/[facility])    • Depressive disorder    • Diabetes mellitus (CMS/[facility])    • GERD (gastroesophageal reflux disease)    • Hemiplegia (CMS/[facility])    • Hyperlipidemia    • Hypertension    • Parkinson's disease (CMS/[facility])    • Renal disorder        No Known Allergies  No current facility-administered medications on file prior to encounter.      No current outpatient prescriptions on file prior to encounter.       Social History     Social History   • Marital status:      Spouse name: N/A   • Number of children: N/A   • Years of education: N/A     Occupational History   • Not on file.     Social History Main Topics   • Smoking status: Never Smoker   • Smokeless tobacco: Former User     Types: Chew   • Alcohol use No   • Drug use: No   • Sexual activity: Not on file     Other Topics Concern   • Not on file     Social History Narrative   • No narrative on file     Family History   Problem Relation Age of Onset   • Alzheimer's disease Mother    • Alcohol abuse Father        Review of Systems  A 14 point review of systems was reviewed and was negative except for left sided weakness, difficulty swallowing, left-sided numbness.    Vital Signs   Temp:  [97.1 °F (36.2 °C)-97.6 °F (36.4 °C)] 97.6 °F (36.4 °C)  Heart Rate:   [48-54] 50  Resp:  [19] 19  BP: (107-137)/(52-89) 129/68    General Exam:  Head:  Normal cephalic, atraumatic  HEENT:  Neck supple  Fundoscopic Exam:  No signs of disc edema  CVS:  Regular rate and rhythm.  No murmurs  Carotid Examination:  No bruits  Lungs:  Clear to auscultation  Abdomen:  Non-tender, Non-distended  Extremities:  No signs of peripheral edema  Skin:  No rashes    Neurologic Exam:    Mental Status:    -Awake, Alert, Oriented X 3  -No word finding difficulties  -No aphasia  -Mild dysarthria  -Follows simple and complex commands    CN II:  Visual fields full.  Pupils equally reactive to light  CN III, IV, VI:  Extraocular Muscles full with no signs of nystagmus  CN V:  Decreased sensation over the lower left face   CN VII:  Right lower left facial droop  CN VIII:  Gross hearing intact bilaterally  CN IX:  Palate elevates symmetrically  CN X:  Palate elevates symmetrically  CN XI:  Shoulder shrug symmetric  CN XII:  Tongue is midline on protrusion    Motor: (strength out of 5:  1= minimal movement, 2 = movement in plane of gravity, 3 = movement against gravity, 4 = movement against some resistance, 5 = full strength)  Tone is increased in the left extremities with spastic component.  A coarse resting tremor noted in the right arm.  The left arm is held in a flexor type posture.  Left leg is extended.  He is able to lift his left arm off the bed with difficulty.  A left pronator drift is present.  He has difficulty lifting his left leg off the bed.    -Right Upper Ext: Proximal: 5 Distal: 5  -Left Upper Ext: Proximal: 3 Distal: 3    -Right Lower Ext: Proximal: 5 Distal: 5  -Left Lower Ext: Proximal: 2 Distal: 3    DTR:  -Right   Bicep: 1+ Tricep: 1+ Brachoradialis: 1+   Patella: 1+ Ankle: 1+ Neg Babinski  -Left   Bicep: 2+ Tricep: 2+ Brachoradialis: 2+   Patella: 2+ Ankle: 2+ POS Babinski    Sensory:  -Diminished to light touch, pinprick, over the left face, arm, and leg.     Coordination:  -Finger to  nose intact on the right, unable to perform this maneuver on the left  -Heel to shin intact on the right, unable to perform this maneuver on the left  -No ataxia    Gait  Deferred      Results Review:  Lab Results (last 24 hours)     Procedure Component Value Units Date/Time    TSH [[id number]]  (Normal) Collected:  08/11/18 0958    Specimen:  Blood from Arm, Left Updated:  08/11/18 1344     TSH 3.320 mIU/mL     Scott Draw [[id number]] Collected:  08/11/18 0958    Specimen:  Blood Updated:  08/11/18 1100    Narrative:       The following orders were created for panel order Scott Draw.  Procedure                               Abnormality         Status                     ---------                               -----------         ------                     Light Blue Top[[id number]]                                   Final result               Green Top (Gel)[[id number]]                                  Final result               Lavender Top[[id number]]                                     Final result               Red Top[[id number]]                                          Final result                 Please view results for these tests on the individual orders.    Light Blue Top [[id number]] Collected:  08/11/18 0958    Specimen:  Blood from Arm, Left Updated:  08/11/18 1100     Extra Tube hold for add-on     Comment: Auto resulted       Green Top (Gel) [[id number]] Collected:  08/11/18 0958    Specimen:  Blood from Arm, Left Updated:  08/11/18 1100     Extra Tube Hold for add-ons.     Comment: Auto resulted.       Lavender Top [[id number]] Collected:  08/11/18 0958    Specimen:  Blood from Arm, Left Updated:  08/11/18 1100     Extra Tube hold for add-on     Comment: Auto resulted       Red Top [[id number]] Collected:  08/11/18 0958    Specimen:  Blood from Arm, Left Updated:  08/11/18 1100     Extra Tube Hold for add-ons.     Comment: Auto resulted.       aPTT [[id number]]  (Normal) Collected:  08/11/18 0958     Specimen:  Blood from Arm, Left Updated:  08/11/18 1048     PTT 27.9 seconds     Protime-INR [[id number]]  (Normal) Collected:  08/11/18 0958    Specimen:  Blood from Arm, Left Updated:  08/11/18 1048     Protime 12.6 Seconds      INR 0.92    Troponin [[id number]]  (Normal) Collected:  08/11/18 0958    Specimen:  Blood from Arm, Left Updated:  08/11/18 1035     Troponin I 0.015 ng/mL     Comprehensive Metabolic Panel [[id number]]  (Abnormal) Collected:  08/11/18 0958    Specimen:  Blood from Arm, Left Updated:  08/11/18 1025     Glucose 109 (H) mg/dL      BUN 26 (H) mg/dL      Creatinine 1.48 (H) mg/dL      Sodium 139 mmol/L      Potassium 4.1 mmol/L      Chloride 103 mmol/L      CO2 22.0 (L) mmol/L      Calcium 9.8 mg/dL      Total Protein 7.5 g/dL      Albumin 4.50 g/dL      ALT (SGPT) 15 U/L      AST (SGOT) 28 U/L      Alkaline Phosphatase 44 U/L      Total Bilirubin 0.6 mg/dL      eGFR Non African Amer 46 (L) mL/min/1.73      Globulin 3.0 gm/dL      A/G Ratio 1.5 g/dL      BUN/Creatinine Ratio 17.6     Anion Gap 14.0 (H) mmol/L     Narrative:       The MDRD GFR formula is only valid for adults with stable renal function between ages 18 and 70.    CBC & Differential [[id number]] Collected:  08/11/18 0958    Specimen:  Blood Updated:  08/11/18 1012    Narrative:       The following orders were created for panel order CBC & Differential.  Procedure                               Abnormality         Status                     ---------                               -----------         ------                     CBC Auto Differential[[id number]]        Abnormal            Final result                 Please view results for these tests on the individual orders.    CBC Auto Differential [[id number]]  (Abnormal) Collected:  08/11/18 0958    Specimen:  Blood from Arm, Left Updated:  08/11/18 1012     WBC 7.39 10*3/mm3      RBC 4.59 (L) 10*6/mm3      Hemoglobin 15.2 g/dL      Hematocrit 42.5 %      MCV 92.6 fL      MCH 33.1 (H)  pg      MCHC 35.8 g/dL      RDW 12.9 %      RDW-SD 43.7 fl      MPV 10.1 fL      Platelets 300 10*3/mm3      Neutrophil % 60.7 %      Lymphocyte % 24.0 %      Monocyte % 12.3 (H) %      Eosinophil % 1.8 %      Basophil % 0.7 %      Immature Grans % 0.5 %      Neutrophils, Absolute 4.49 10*3/mm3      Lymphocytes, Absolute 1.77 10*3/mm3      Monocytes, Absolute 0.91 10*3/mm3      Eosinophils, Absolute 0.13 10*3/mm3      Basophils, Absolute 0.05 10*3/mm3      Immature Grans, Absolute 0.04 (H) 10*3/mm3      nRBC 0.0 /100 WBC     POC Glucose Once [[id number]]  (Normal) Collected:  08/11/18 0947    Specimen:  Blood Updated:  08/11/18 1006     Glucose 130 mg/dL      Comment: : [id number] Stanley Sanchez LMeter ID: [id number]             .  Imaging Results (last 24 hours)     Procedure Component Value Units Date/Time    CT Head Without Contrast [[id number]] Collected:  08/11/18 1044     Updated:  08/11/18 1048    Narrative:       EXAMINATION:   CT HEAD WO CONTRAST-  8/11/2018 10:44 AM CDT     CT BRAIN without contrast 8/11/2018 10:25 AM CDT     HISTORY: Weakness     COMPARISON: None      DLP: 661 mGy cm     TECHNIQUE: Serial axial tomographic images of the brain were obtained  without the use of intravenous contrast.      FINDINGS:   The midline structures are nondisplaced. There is moderate cerebral and  cerebellar volume loss, with an associated increase in the prominence of  the ventricles and sulci. The basilar cisterns are normal in size and  configuration. There is no evidence of intracranial hemorrhage or  mass-effect. There is low attenuation in the periventricular white  matter, consistent with chronic ischemic change. There are no abnormal  extra-axial fluid collections. There is no evidence of tonsillar  herniation.      The included orbits and their contents are unremarkable. The visualized  paranasal sinuses, mastoid air cells and middle ear cavities are clear.  The visualized osseous structures and  overlying soft tissues of the  skull and face are intact.        Impression:       Changes of aging with no acute intracranial abnormality  This report was finalized on 08/11/2018 10:45 by Dr. Cesar Rodriguez MD.    XR Chest 1 View [[id number]] Collected:  08/11/18 1020     Updated:  08/11/18 1023    Narrative:       EXAMINATION:   XR CHEST 1 VW-  8/11/2018 10:20 AM CDT     HISTORY: Left-sided weakness     Frontal upright radiograph of the chest 8/11/2018 10:18 AM CDT     COMPARISON: None.     FINDINGS:   The lungs are clear. The cardiomediastinal silhouette and pulmonary  vascularity are within normal limits.      The osseous structures and surrounding soft tissues demonstrate no acute  abnormality.       Impression:       1. No radiographic evidence of acute cardiopulmonary process.        This report was finalized on 08/11/2018 10:20 by Dr. Csear Rodriguez MD.              Impression    Left face arm and leg hemiparesis and hemisensory deficit  Rule out acute ischemic stroke versus worsening of prior stroke symptoms  Mild Parkinson's disease    Plan  Continue aspirin 81 mg daily, Plavix 75 mg daily, Lipitor 80 mg daily  MRI brain without contrast  Carotid ultrasound  Transthoracic echocardiogram  Fasting lipid panel, A1c  Will check urinalysis rule out UTI, rule out other metabolic abnormalities  Holding hydralazine, Permissive hypertension up to /90 for the next 24-48 hours  Try to obtain records from the recent hospitalization at [facility]  DVT prophylaxis - SCDs  PT/OT/ST - swallow eval    I discussed the patients findings and my recommendations with patient and family    Jillian Ortiz MD  08/11/18  2:01 PM

## 2018-08-11 NOTE — ED PROVIDER NOTES
Subjective     Stroke   Presenting symptoms: weakness    Presenting symptoms: no headaches      Patient is an 78-year-old  man who presents to ED by EMS.  Both he and the patient provide most of the history.  Nursing home records give some history as well however, there is some conflicting history.  Chief complaint is stroke.    The patient describes a history of stroke about one year 7 month ago.  He had severe left lower extremity weakness and moderate left upper extremity weakness with left-sided facial droop.  The patient is at [facility] for rehabilitation.  His baseline before today was ambulating with a walker, left lower extremity profound weakness with moderate left extremity weakness and the continued left-sided facial droop.  He normally is able to eat without any difficulty.    The patient describes at midnight this past evening, he noticed increased weakness to that left side and increased numbness.  He reports this has progressively worsened throughout the night.  We contacted the nursing home and the day nurse who saw him said that nurse's aide was able to dress him at 7:30 without any difficulty.  Wife reports that he always had some difficulty moving that left arm.  At 8:30 this morning, he had profound left-sided facial weakness and drooling.  He was unable to use food as it was Falling out of his mouth.  Wife reports she arrived at the nursing home and notices this as well.  The patient is insistent this all started midnight and has worsened.    Patient is on aspirin and Plavix for his previous stroke.  He denies being on TPA.  He denies any sense of chest pain, pressure, or tightness.    Review of Systems   Constitutional: Positive for activity change.   HENT: Negative.    Eyes: Negative.  Negative for photophobia, pain, discharge, redness, itching and visual disturbance.   Respiratory: Negative.    Cardiovascular: Negative.    Gastrointestinal: Negative.    Genitourinary: Negative.   "  Musculoskeletal: Negative.    Neurological: Positive for weakness and numbness. Negative for light-headedness and headaches.   Psychiatric/Behavioral: Negative.        Past Medical History:   Diagnosis Date   • Arthritis    • CVA (cerebral vascular accident) (CMS/[facility])    • Depressive disorder    • Diabetes mellitus (CMS/[facility])    • GERD (gastroesophageal reflux disease)    • Hemiplegia (CMS/[facility])    • Hyperlipidemia    • Hypertension    • Parkinson's disease (CMS/[facility])    • Renal disorder        No Known Allergies    No past surgical history on file.    No family history on file.    Social History     Social History   • Marital status:      Social History Main Topics   • Drug use: Unknown     Other Topics Concern   • Not on file       Prior to Admission medications    Not on File       Medications   sodium chloride 0.9 % infusion (125 mL/hr Intravenous New Bag 8/11/18 1113)       /60   Pulse (!) 49   Temp 97.1 °F (36.2 °C) (Temporal Artery )   Resp 19   Ht 182.9 cm (72\")   Wt 102 kg (225 lb)   SpO2 93%   BMI 30.52 kg/m²       Objective   Physical Exam   Constitutional: He is oriented to person, place, and time. He appears well-developed and well-nourished.  Non-toxic appearance. No distress.   HENT:   Head: Normocephalic and atraumatic.   Right Ear: External ear normal.   Left Ear: External ear normal.   Nose: Nose normal.   Mouth/Throat: Uvula is midline, oropharynx is clear and moist and mucous membranes are normal.   Eyes: Pupils are equal, round, and reactive to light. Conjunctivae, EOM and lids are normal. Lids are everted and swept, no foreign bodies found.   Neck: Trachea normal, normal range of motion, full passive range of motion without pain and phonation normal. Neck supple. Normal carotid pulses and no JVD present. Carotid bruit is not present. No neck rigidity.   Cardiovascular: Normal rate, regular rhythm, normal heart sounds, intact distal pulses and normal pulses.  "   Pulmonary/Chest: Effort normal and breath sounds normal. No stridor. No apnea and no tachypnea. No respiratory distress.   Abdominal: Soft. Normal appearance, normal aorta and bowel sounds are normal.   Musculoskeletal: Normal range of motion.   Neurological: He is alert and oriented to person, place, and time. He has normal strength. A cranial nerve deficit and sensory deficit is present. Coordination and gait abnormal. GCS eye subscore is 4. GCS verbal subscore is 5. GCS motor subscore is 6.   Cranial nerve evaluation:  No aphasia.  PERRLA. Normal visual fields. Normal smooth eye movement.   Left lower facial weakness.  Tongue is midline with normal gag reflex.   Weak left sternocleidomastoid movement.   Left upper extremity and lower extremity are flaccid.  Significant decreased sensation to LLE. Same equally to BUE and face.   No motor deficits.   No ataxia to RUE and RLE.   Skin: Skin is warm, dry and intact. Capillary refill takes less than 2 seconds. He is not diaphoretic. No pallor.   Psychiatric: He has a normal mood and affect. His speech is normal and behavior is normal.   Nursing note and vitals reviewed.      Procedures         Lab Results (last 24 hours)     Procedure Component Value Units Date/Time    POC Glucose Once [[id number]]  (Normal) Collected:  08/11/18 0947    Specimen:  Blood Updated:  08/11/18 1006     Glucose 130 mg/dL      Comment: : [id number] Stanley Sanchez LMeter ID: [id number]       CBC & Differential [[id number]] Collected:  08/11/18 0958    Specimen:  Blood Updated:  08/11/18 1012    Narrative:       The following orders were created for panel order CBC & Differential.  Procedure                               Abnormality         Status                     ---------                               -----------         ------                     CBC Auto Differential[[id number]]        Abnormal            Final result                 Please view results for these tests on the  individual orders.    Comprehensive Metabolic Panel [[id number]]  (Abnormal) Collected:  08/11/18 0958    Specimen:  Blood from Arm, Left Updated:  08/11/18 1025     Glucose 109 (H) mg/dL      BUN 26 (H) mg/dL      Creatinine 1.48 (H) mg/dL      Sodium 139 mmol/L      Potassium 4.1 mmol/L      Chloride 103 mmol/L      CO2 22.0 (L) mmol/L      Calcium 9.8 mg/dL      Total Protein 7.5 g/dL      Albumin 4.50 g/dL      ALT (SGPT) 15 U/L      AST (SGOT) 28 U/L      Alkaline Phosphatase 44 U/L      Total Bilirubin 0.6 mg/dL      eGFR Non African Amer 46 (L) mL/min/1.73      Globulin 3.0 gm/dL      A/G Ratio 1.5 g/dL      BUN/Creatinine Ratio 17.6     Anion Gap 14.0 (H) mmol/L     Narrative:       The MDRD GFR formula is only valid for adults with stable renal function between ages 18 and 70.    aPTT [[id number]]  (Normal) Collected:  08/11/18 0958    Specimen:  Blood from Arm, Left Updated:  08/11/18 1048     PTT 27.9 seconds     Protime-INR [[id number]]  (Normal) Collected:  08/11/18 0958    Specimen:  Blood from Arm, Left Updated:  08/11/18 1048     Protime 12.6 Seconds      INR 0.92    Troponin [[id number]]  (Normal) Collected:  08/11/18 0958    Specimen:  Blood from Arm, Left Updated:  08/11/18 1035     Troponin I 0.015 ng/mL     CBC Auto Differential [[id number]]  (Abnormal) Collected:  08/11/18 0958    Specimen:  Blood from Arm, Left Updated:  08/11/18 1012     WBC 7.39 10*3/mm3      RBC 4.59 (L) 10*6/mm3      Hemoglobin 15.2 g/dL      Hematocrit 42.5 %      MCV 92.6 fL      MCH 33.1 (H) pg      MCHC 35.8 g/dL      RDW 12.9 %      RDW-SD 43.7 fl      MPV 10.1 fL      Platelets 300 10*3/mm3      Neutrophil % 60.7 %      Lymphocyte % 24.0 %      Monocyte % 12.3 (H) %      Eosinophil % 1.8 %      Basophil % 0.7 %      Immature Grans % 0.5 %      Neutrophils, Absolute 4.49 10*3/mm3      Lymphocytes, Absolute 1.77 10*3/mm3      Monocytes, Absolute 0.91 10*3/mm3      Eosinophils, Absolute 0.13 10*3/mm3      Basophils, Absolute  0.05 10*3/mm3      Immature Grans, Absolute 0.04 (H) 10*3/mm3      nRBC 0.0 /100 WBC           Ct Head Without Contrast    Result Date: 8/11/2018  Narrative: EXAMINATION:   CT HEAD WO CONTRAST-  8/11/2018 10:44 AM CDT  CT BRAIN without contrast 8/11/2018 10:25 AM CDT  HISTORY: Weakness  COMPARISON: None  DLP: 661 mGy cm  TECHNIQUE: Serial axial tomographic images of the brain were obtained without the use of intravenous contrast.  FINDINGS: The midline structures are nondisplaced. There is moderate cerebral and cerebellar volume loss, with an associated increase in the prominence of the ventricles and sulci. The basilar cisterns are normal in size and configuration. There is no evidence of intracranial hemorrhage or mass-effect. There is low attenuation in the periventricular white matter, consistent with chronic ischemic change. There are no abnormal extra-axial fluid collections. There is no evidence of tonsillar herniation.  The included orbits and their contents are unremarkable. The visualized paranasal sinuses, mastoid air cells and middle ear cavities are clear. The visualized osseous structures and overlying soft tissues of the skull and face are intact.      Impression: Changes of aging with no acute intracranial abnormality This report was finalized on 08/11/2018 10:45 by Dr. Cesar Rodriguez MD.    Xr Chest 1 View    Result Date: 8/11/2018  Narrative: EXAMINATION:   XR CHEST 1 VW-  8/11/2018 10:20 AM CDT  HISTORY: Left-sided weakness  Frontal upright radiograph of the chest 8/11/2018 10:18 AM CDT  COMPARISON: None.  FINDINGS: The lungs are clear. The cardiomediastinal silhouette and pulmonary vascularity are within normal limits.  The osseous structures and surrounding soft tissues demonstrate no acute abnormality.      Impression: 1. No radiographic evidence of acute cardiopulmonary process.   This report was finalized on 08/11/2018 10:20 by Dr. Cesar Rodriguez MD.      ED Course        I spoken to both  Nata Ballesteros and Angel, ER physician and neurologist respectively,  about the patient's conflicting history from the patient and the day nurse from the nursing home. Because the patient is an excellent Historian an of sound mind, we have to listen to his history.  Therefore, onset time is about midnight.  The patient was out the window to complete TPA.      I spoken with Dr. Odom, hospitalist on-call, who would like the patient admitted under their services.  MDM    Final diagnoses:   Cerebrovascular accident (CVA), unspecified mechanism (CMS/HCC)          Ursula Yen PA  08/11/18 1136

## 2018-08-12 ENCOUNTER — APPOINTMENT (OUTPATIENT)
Dept: CT IMAGING | Facility: HOSPITAL | Age: 78
End: 2018-08-12

## 2018-08-12 LAB
ANION GAP SERPL CALCULATED.3IONS-SCNC: 11 MMOL/L (ref 4–13)
ARTICHOKE IGE QN: 101 MG/DL (ref 0–99)
BILIRUB UR QL STRIP: NEGATIVE
BUN BLD-MCNC: 27 MG/DL (ref 5–21)
BUN/CREAT SERPL: 20.9 (ref 7–25)
CALCIUM SPEC-SCNC: 9 MG/DL (ref 8.4–10.4)
CHLORIDE SERPL-SCNC: 102 MMOL/L (ref 98–110)
CHOLEST SERPL-MCNC: 167 MG/DL (ref 130–200)
CLARITY UR: CLEAR
CO2 SERPL-SCNC: 24 MMOL/L (ref 24–31)
COLOR UR: YELLOW
CREAT BLD-MCNC: 1.29 MG/DL (ref 0.5–1.4)
DEPRECATED RDW RBC AUTO: 45.9 FL (ref 40–54)
ERYTHROCYTE [DISTWIDTH] IN BLOOD BY AUTOMATED COUNT: 12.9 % (ref 12–15)
FOLATE SERPL-MCNC: 15.3 NG/ML
GFR SERPL CREATININE-BSD FRML MDRD: 54 ML/MIN/1.73
GLUCOSE BLD-MCNC: 297 MG/DL (ref 70–100)
GLUCOSE BLDC GLUCOMTR-MCNC: 189 MG/DL (ref 70–130)
GLUCOSE BLDC GLUCOMTR-MCNC: 215 MG/DL (ref 70–130)
GLUCOSE BLDC GLUCOMTR-MCNC: 269 MG/DL (ref 70–130)
GLUCOSE BLDC GLUCOMTR-MCNC: 288 MG/DL (ref 70–130)
GLUCOSE UR STRIP-MCNC: ABNORMAL MG/DL
HBA1C MFR BLD: 8.3 %
HCT VFR BLD AUTO: 40.4 % (ref 40–52)
HCT VFR BLD AUTO: 40.7 % (ref 40–52)
HDLC SERPL-MCNC: 30 MG/DL
HGB BLD-MCNC: 13.9 G/DL (ref 14–18)
HGB UR QL STRIP.AUTO: NEGATIVE
KETONES UR QL STRIP: ABNORMAL
LDLC/HDLC SERPL: 3.25 {RATIO}
LEUKOCYTE ESTERASE UR QL STRIP.AUTO: NEGATIVE
MCH RBC QN AUTO: 32.9 PG (ref 28–32)
MCHC RBC AUTO-ENTMCNC: 34.4 G/DL (ref 33–36)
MCV RBC AUTO: 95.7 FL (ref 82–95)
NITRITE UR QL STRIP: NEGATIVE
PA ADP PRP-ACNC: 81 PRU
PH UR STRIP.AUTO: <=5 [PH] (ref 5–8)
PLATELET # BLD AUTO: 238 10*3/MM3 (ref 130–400)
PLATELET # BLD AUTO: 255 10*3/MM3 (ref 130–400)
PMV BLD AUTO: 10.3 FL (ref 6–12)
POTASSIUM BLD-SCNC: 4.6 MMOL/L (ref 3.5–5.3)
PROT UR QL STRIP: ABNORMAL
RBC # BLD AUTO: 4.22 10*6/MM3 (ref 4.8–5.9)
SODIUM BLD-SCNC: 137 MMOL/L (ref 135–145)
SP GR UR STRIP: 1.03 (ref 1–1.03)
TRIGL SERPL-MCNC: 198 MG/DL (ref 0–149)
UROBILINOGEN UR QL STRIP: ABNORMAL
VIT B12 BLD-MCNC: 209 PG/ML (ref 239–931)
WBC NRBC COR # BLD: 6.11 10*3/MM3 (ref 4.8–10.8)

## 2018-08-12 PROCEDURE — 63710000001 INSULIN DETEMIR PER 5 UNITS: Performed by: NURSE PRACTITIONER

## 2018-08-12 PROCEDURE — 85576 BLOOD PLATELET AGGREGATION: CPT | Performed by: NURSE PRACTITIONER

## 2018-08-12 PROCEDURE — 80061 LIPID PANEL: CPT | Performed by: NURSE PRACTITIONER

## 2018-08-12 PROCEDURE — 97166 OT EVAL MOD COMPLEX 45 MIN: CPT | Performed by: OCCUPATIONAL THERAPIST

## 2018-08-12 PROCEDURE — G8987 SELF CARE CURRENT STATUS: HCPCS | Performed by: OCCUPATIONAL THERAPIST

## 2018-08-12 PROCEDURE — 85027 COMPLETE CBC AUTOMATED: CPT | Performed by: NURSE PRACTITIONER

## 2018-08-12 PROCEDURE — 82746 ASSAY OF FOLIC ACID SERUM: CPT | Performed by: PSYCHIATRY & NEUROLOGY

## 2018-08-12 PROCEDURE — 63710000001 INSULIN LISPRO (HUMAN) PER 5 UNITS: Performed by: NURSE PRACTITIONER

## 2018-08-12 PROCEDURE — 83036 HEMOGLOBIN GLYCOSYLATED A1C: CPT | Performed by: NURSE PRACTITIONER

## 2018-08-12 PROCEDURE — 99232 SBSQ HOSP IP/OBS MODERATE 35: CPT | Performed by: PSYCHIATRY & NEUROLOGY

## 2018-08-12 PROCEDURE — G8988 SELF CARE GOAL STATUS: HCPCS | Performed by: OCCUPATIONAL THERAPIST

## 2018-08-12 PROCEDURE — 80048 BASIC METABOLIC PNL TOTAL CA: CPT | Performed by: NURSE PRACTITIONER

## 2018-08-12 PROCEDURE — 82607 VITAMIN B-12: CPT | Performed by: PSYCHIATRY & NEUROLOGY

## 2018-08-12 PROCEDURE — 70450 CT HEAD/BRAIN W/O DYE: CPT

## 2018-08-12 PROCEDURE — 82962 GLUCOSE BLOOD TEST: CPT

## 2018-08-12 RX ORDER — CHOLECALCIFEROL (VITAMIN D3) 125 MCG
500 CAPSULE ORAL DAILY
Status: DISCONTINUED | OUTPATIENT
Start: 2018-08-12 | End: 2018-08-13

## 2018-08-12 RX ADMIN — FENOFIBRATE 145 MG: 145 TABLET ORAL at 08:09

## 2018-08-12 RX ADMIN — INSULIN LISPRO 6 UNITS: 100 INJECTION, SOLUTION INTRAVENOUS; SUBCUTANEOUS at 12:17

## 2018-08-12 RX ADMIN — CLOPIDOGREL 75 MG: 75 TABLET, FILM COATED ORAL at 08:10

## 2018-08-12 RX ADMIN — PANTOPRAZOLE SODIUM 40 MG: 40 TABLET, DELAYED RELEASE ORAL at 08:09

## 2018-08-12 RX ADMIN — INSULIN LISPRO 2 UNITS: 100 INJECTION, SOLUTION INTRAVENOUS; SUBCUTANEOUS at 21:21

## 2018-08-12 RX ADMIN — LOSARTAN POTASSIUM 100 MG: 50 TABLET, FILM COATED ORAL at 08:09

## 2018-08-12 RX ADMIN — CARBIDOPA AND LEVODOPA 2 TABLET: 25; 100 TABLET ORAL at 08:10

## 2018-08-12 RX ADMIN — INSULIN DETEMIR 15 UNITS: 100 INJECTION, SOLUTION SUBCUTANEOUS at 21:22

## 2018-08-12 RX ADMIN — CARBIDOPA AND LEVODOPA 2 TABLET: 25; 100 TABLET ORAL at 17:11

## 2018-08-12 RX ADMIN — INSULIN LISPRO 4 UNITS: 100 INJECTION, SOLUTION INTRAVENOUS; SUBCUTANEOUS at 17:11

## 2018-08-12 RX ADMIN — SODIUM CHLORIDE 50 ML/HR: 9 INJECTION, SOLUTION INTRAVENOUS at 12:17

## 2018-08-12 RX ADMIN — INSULIN LISPRO 6 UNITS: 100 INJECTION, SOLUTION INTRAVENOUS; SUBCUTANEOUS at 08:10

## 2018-08-12 RX ADMIN — ASPIRIN 81 MG 81 MG: 81 TABLET ORAL at 08:10

## 2018-08-12 RX ADMIN — CARBIDOPA AND LEVODOPA 2 TABLET: 25; 100 TABLET ORAL at 21:21

## 2018-08-12 RX ADMIN — TRAZODONE HYDROCHLORIDE 150 MG: 150 TABLET, FILM COATED ORAL at 21:21

## 2018-08-12 RX ADMIN — ATORVASTATIN CALCIUM 80 MG: 40 TABLET, FILM COATED ORAL at 21:21

## 2018-08-12 RX ADMIN — ESCITALOPRAM 20 MG: 10 TABLET, FILM COATED ORAL at 08:10

## 2018-08-12 RX ADMIN — Medication 500 MCG: at 13:47

## 2018-08-12 NOTE — PROGRESS NOTES
[facility] Medicine Services  INPATIENT PROGRESS NOTE    Patient Name: Angelika Sapp  Date of Admission: 8/11/2018  Today's Date: 08/12/18  Length of Stay: 1  Primary Care Physician: Sergio Ma MD    Subjective   Chief Complaint: left sided weakness    HPI   Currently sitting up in bed.  Still having left sided weakness/flaccid.  Is able to move hands and feet some but cannot raise off the bed or move either limb around.  He is alert and seems to be oriented at this time.  No family was present during my evaluation.  He denies any complaints.  Discussed MRI results and further testing to be done.     Review of Systems   Constitutional: Positive for activity change and fatigue. Negative for appetite change, chills and fever.   HENT: Negative for hearing loss, nosebleeds, tinnitus and trouble swallowing.    Eyes: Negative for visual disturbance.   Respiratory: Negative for cough, chest tightness, shortness of breath and wheezing.    Cardiovascular: Negative for chest pain, palpitations and leg swelling.   Gastrointestinal: Negative for abdominal distention, abdominal pain, blood in stool, constipation, diarrhea, nausea and vomiting.   Endocrine: Negative for cold intolerance, heat intolerance, polydipsia, polyphagia and polyuria.   Genitourinary: Negative for decreased urine volume, difficulty urinating, dysuria, flank pain, frequency and hematuria.   Musculoskeletal: Negative for arthralgias, joint swelling and myalgias.   Skin: Negative for rash.   Allergic/Immunologic: Negative for immunocompromised state.   Neurological: Positive for weakness (left sided) and light-headedness. Negative for dizziness, syncope and headaches.   Hematological: Negative for adenopathy. Does not bruise/bleed easily.   Psychiatric/Behavioral: Positive for confusion (intermittently ). Negative for sleep disturbance. The patient is not nervous/anxious.       All pertinent negatives and positives  are as above. All other systems have been reviewed and are negative unless otherwise stated.     Objective    Temp:  [97.6 °F (36.4 °C)-98.6 °F (37 °C)] 98.4 °F (36.9 °C)  Heart Rate:  [50-59] 56  Resp:  [16-20] 20  BP: (125-147)/(58-69) 133/69     Physical Exam   Constitutional: He is oriented to person, place, and time. He appears well-developed and well-nourished.   Obese. NAD.  Sitting up in bed.     HENT:   Head: Normocephalic and atraumatic.   Eyes: Pupils are equal, round, and reactive to light. Conjunctivae and EOM are normal.   Neck: Neck supple. No JVD present. No thyromegaly present.   Cardiovascular: Regular rhythm, normal heart sounds and intact distal pulses.  Bradycardia present.  Exam reveals no gallop and no friction rub.    No murmur heard.  SB 53-62   Pulmonary/Chest: Effort normal and breath sounds normal. No respiratory distress. He has no wheezes. He has no rales. He exhibits no tenderness.   diminished    Abdominal: Soft. Bowel sounds are normal. He exhibits distension (obese abdomen ). There is no tenderness. There is no rebound and no guarding.   Musculoskeletal: He exhibits no edema, tenderness or deformity.   Lymphadenopathy:     He has no cervical adenopathy.   Neurological: He is alert and oriented to person, place, and time. A sensory deficit is present.   Left upper and lower extremity flaccid    Skin: Skin is warm and dry. No rash noted.   Psychiatric: He has a normal mood and affect. His behavior is normal. Judgment and thought content normal.   Nursing note and vitals reviewed.    Results Review:  I have reviewed the labs, radiology results, and diagnostic studies.    Laboratory Data:     Results from last 7 days  Lab Units 08/12/18  1000 08/11/18  0958   WBC 10*3/mm3 6.11 7.39   HEMOGLOBIN g/dL 13.9* 15.2   HEMATOCRIT % 40.7  40.4 42.5   PLATELETS 10*3/mm3 238  255 300       Results from last 7 days  Lab Units 08/12/18  1000 08/11/18  0958   SODIUM mmol/L 137 139   POTASSIUM  mmol/L 4.6 4.1   CHLORIDE mmol/L 102 103   CO2 mmol/L 24.0 22.0*   BUN mg/dL 27* 26*   CREATININE mg/dL 1.29 1.48*   CALCIUM mg/dL 9.0 9.8   BILIRUBIN mg/dL  --  0.6   ALK PHOS U/L  --  44   ALT (SGPT) U/L  --  15   AST (SGOT) U/L  --  28   GLUCOSE mg/dL 297* 109*       Results from last 7 days  Lab Units 08/12/18  1000   CHOLESTEROL mg/dL 167   TRIGLYCERIDES mg/dL 198*   HDL CHOL mg/dL 30*       Results from last 7 days  Lab Units 08/12/18  1000   LDL CHOL mg/dL 101*     Radiology Data:   Imaging Results (last 24 hours)     Procedure Component Value Units Date/Time    MRI Brain Without Contrast [[id number]] Collected:  08/11/18 1537     Updated:  08/11/18 1542    Narrative:       MRI BRAIN WO CONTRAST- 8/11/2018 3:13 PM CDT     HISTORY: Stroke; I63.9-Cerebral infarction, unspecified;  R13.12-Dysphagia, oropharyngeal phase     COMPARISON: None.       TECHNIQUE: Multiplanar imaging of the brain was performed in a routine  fashion without contrast.     FINDINGS:   Diffusion: In the right hemisphere restricted diffusion is noted in the  basal ganglia region extending to the right paraventricular region..     Midline structures: Nondisplaced.     Ventricles: Normal in configuration and symmetric in size.     Masses: No masses or mass effect.     Basilar cisterns: Maintained.     Extra axial space: No abnormal extra-axial fluid.     Gray-white matter signal: Mild changes of aging are noted. There some  slight increased signal in the paraventricular white matter.     Cerebellum: Normal.     Brainstem: Normal.     Other: Proximal cervical spinal cord is normal. Bilateral globes and  orbits are normal in appearance. Normal cerebrovascular flow voids  noted. Mucous cyst is present right maxillary antrum mastoid air cells  are normally pneumatized.       Impression:       1. Ischemic infarction of right basal ganglia  extending to the right  paraventricular region in the right hemisphere   This report was finalized on  08/11/2018 15:39 by Dr. Cesar Rodriguez MD.    US Carotid Bilateral [[id number]] Updated:  08/11/18 1510          I have reviewed the patient's current medications.     Assessment/Plan     Assessment/Plan:  1.  Right lacunar infarct with extension into the right paraventricular region in the right hemisphere              - Appreciate Neurology's assistance              - MRI brain: right basal ganglia stroke with extension into the right paraventricular region               - 2D echocardiogram with bubble study: Negative bubble study, LVEF 65%, diastolic dysfunctioni. Mild AS.              - Bilateral Carotid Duplex: Negative              - PT/OT              - Speech evaluation: soft foods with nectar thick liquids               - Lipid profile:               - Check HghA1c.              - Resume ASA and Plavix.              - Hold Lovenox for DVT prophylaxis.    - Obtain records from [facility], any CT/MRI scans done during admission last week.     2.  Hyperlipidemia              - Lipid profile:               - Lipitor 80 mg nightly              - Continue Tricor     3.  Insulin dependent diabetes mellitus, type II              - HgbA1c pending              - Insulin, Levemir 15 units nightly              - Accu checks ac/hs              - Glucoses: 179, 264, 269.     4.  Parkinson's Disease              - Continue Sinemet      5.  Hypertension              - Resume ARB, Losartan substituted for Benicar per formulary               - Hydralazine 25 mg BID     6.  Chronic kidney disease, stage III              - Decrease NS to 50 ml/hr               - Recheck BMP in AM     7.  Gastroesophageal Reflux disease              - Protonix     8.  Depression              - Resume Trazodone and Lexapro    Discharge Planning: I expect the patient to be discharged to [place] in 1-2 days.    BRENNA Trinh   08/12/18   12:05 PM

## 2018-08-12 NOTE — THERAPY EVALUATION
Acute Care - Occupational Therapy Initial Evaluation  [facility]     Patient Name: Angelika Sapp  : 1940  MRN: [id number]  Today's Date: 2018  Onset of Illness/Injury or Date of Surgery: 18  Date of Referral to OT: 18  Referring Physician: BRENNA Kaur    Admit Date: 2018       ICD-10-CM ICD-9-CM   1. Cerebrovascular accident (CVA), unspecified mechanism (CMS/HCC) I63.9 434.91   2. Oropharyngeal dysphagia R13.12 787.22   3. Impaired mobility and ADLs Z74.09 799.89     Patient Active Problem List   Diagnosis   • Cerebrovascular accident (CVA) (CMS/HCC)     Past Medical History:   Diagnosis Date   • Arthritis    • CVA (cerebral vascular accident) (CMS/HCC)    • Depressive disorder    • Diabetes mellitus (CMS/HCC)    • GERD (gastroesophageal reflux disease)    • Hemiplegia (CMS/HCC)    • Hyperlipidemia    • Hypertension    • Parkinson's disease (CMS/HCC)    • Renal disorder      History reviewed. No pertinent surgical history.       OT ASSESSMENT FLOWSHEET (last 72 hours)      Occupational Therapy Evaluation     Row Name 18 1413                   OT Evaluation Time/Intention    Subjective Information complains of;fatigue;weakness;numbness  -MM        Document Type evaluation  -MM        Mode of Treatment occupational therapy  -MM           General Information    Patient Profile Reviewed? yes  -MM        Onset of Illness/Injury or Date of Surgery 18  -MM        Referring Physician BRENNA Kaur  -MM        Patient Observations alert;cooperative;agree to therapy  -MM        Patient/Family Observations spouse and friends present  -MM        General Observations of Patient fowlers, no apparent distress, pillow under LUE  -MM        Prior Level of Function independent:;all household mobility;transfer;bed mobility;feeding;grooming;min assist:;mod assist:;dressing;bathing  -MM        Equipment Currently Used at Home wheelchair;walker, rolling;cane, straight;shower chair  -MM         Pertinent History of Current Functional Problem Prior CVA approximately 2 years prior with L sided weakness. Progressive L sides n/t progressing to L sided flaccid. Dx: R basal ganglia infarct iwth extention to R paraventricular region in R hemisphere.  -MM        Existing Precautions/Restrictions fall;other (see comments)   L side flaccid  -MM        Risks Reviewed patient:;spouse/S.O.:;LOB;nausea/vomiting;dizziness;increased discomfort  -MM        Benefits Reviewed patient:;improve function;increase strength;increase independence;increase balance;decrease pain;increase knowledge;improve skin integrity;decrease risk of DVT  -MM        Barriers to Rehab medically complex;previous functional deficit;physical barrier  -MM           Relationship/Environment    Lives With spouse  -MM        Name(s) of Who Lives With Patient Patricia  -MM        Family Caregiver if Needed child(sue), adult  -MM        Concerns About Impact on Relationships spouse hops to remodel bathroom to walk in shower prior to pt returning home  -MM           Resource/Environmental Concerns    Current Living Arrangements home/apartment/condo  -MM           Home Main Entrance    Number of Stairs, Main Entrance two  -MM        Stair Railings, Main Entrance none  -MM           Cognitive Assessment/Interventions    Additional Documentation Cognitive Assessment/Intervention (Group)  -MM           Cognitive Assessment/Intervention- PT/OT    Affect/Mental Status (Cognitive) WNL  -MM        Orientation Status (Cognition) oriented x 4  -MM        Follows Commands (Cognition) WFL  -MM        Cognitive Function (Cognitive) WFL  -MM        Personal Safety Interventions fall prevention program maintained;muscle strengthening facilitated;nonskid shoes/slippers when out of bed;supervised activity  -MM           Bed Mobility Assessment/Treatment    Bed Mobility Assessment/Treatment rolling left  -MM        Rolling Left [place] (Bed Mobility) maximum assist  (25% patient effort);verbal cues  -MM        Bed Mobility, Safety Issues decreased use of legs for bridging/pushing;decreased use of arms for pushing/pulling  -MM        Assistive Device (Bed Mobility) bed rails  -MM           ADL Assessment/Intervention    BADL Assessment/Intervention lower body dressing  -MM           Lower Body Dressing Assessment/Training    Lower Body Dressing [place] Level socks;dependent (less than 25% patient effort)   adjust socks  -MM        Lower Body Dressing Position supine  -MM           General ROM    GENERAL ROM COMMENTS RUE/LE WNL AROM, LUE/LE PROM WNL  -MM           General Assessment (Manual Muscle Testing)    Comment, General Manual Muscle Testing (MMT) Assessment RUE/RLE 4+/5. LUE 0/5, LLE grossly 0/5 but 1/5 in L foot  -MM           Motor Assessment/Interventions    Additional Documentation Fine Motor Testing & Training (Group);Gross Motor Coordination (Group)  -MM           Gross Motor Coordination    Gross Motor Skill, Impairments Detail L sided severe impaired  -MM           Fine Motor Testing & Training    Comment, Fine Motor Coordination LUE impaired severe  -MM           Sensory Assessment/Intervention    Sensory General Assessment light touch sensation deficits identified  -MM           Light Touch Sensation Assessment    Left Upper Extremity: Light Touch Sensation Assessment moderate impairment, 50 to 74% correct responses  -MM        Right Upper Extremity: Light Touch Sensation Assessment intact  -MM        Left Lower Extremity: Light Touch Sensation Assessment moderate impairment, 50 to 74% correct responses  -MM        Right Lower Extremity: Light Touch Sensation Assessment intact  -MM           Positioning and Restraints    Pre-Treatment Position in bed  -MM        Post Treatment Position bed  -MM        In Bed notified nsg;fowlers;call light within reach;encouraged to call for assist;with family/caregiver;side rails up x2   pillow under L arm  -MM            Pain Assessment    Additional Documentation Pain Scale: Numbers Pre/Post-Treatment (Group)  -MM           Pain Scale: Numbers Pre/Post-Treatment    Pain Scale: Numbers, Pretreatment 0/10 - no pain  -MM        Pain Scale: Numbers, Post-Treatment 0/10 - no pain  -MM           Coping    Observed Emotional State calm;cooperative  -MM        Verbalized Emotional State acceptance  -MM           Plan of Care Review    Plan of Care Reviewed With patient  -MM           Clinical Impression (OT)    Date of Referral to OT 08/11/18  -MM        OT Diagnosis impaired mobility and adls  -MM        Functional Level at Time of Evaluation (OT Eval) fair/poor  -MM        Patient/Family Goals Statement (OT Eval) per spouse, more therapy before returning home  -MM        Criteria for Skilled Therapeutic Interventions Met (OT Eval) yes;treatment indicated  -MM        Rehab Potential (OT Eval) good, to achieve stated therapy goals  -MM        Therapy Frequency (OT Eval) daily  -MM        Predicted Duration of Therapy Intervention (Therapy Eval) until d/c  -MM        Care Plan Review (OT) evaluation/treatment results reviewed;care plan/treatment goals reviewed;risks/benefits reviewed;current/potential barriers reviewed;patient/other agree to care plan  -MM        Anticipated Discharge Disposition (OT) inpatient rehabilitation facility;skilled nursing facility  -MM           Planned OT Interventions    Planned Therapy Interventions (OT Eval) activity tolerance training;adaptive equipment training;BADL retraining;functional balance retraining;neuromuscular control/coordination retraining;occupation/activity based interventions;orthotic fabrication/fitting/training;patient/caregiver education/training;passive ROM/stretching;ROM/therapeutic exercise;strengthening exercise;transfer/mobility retraining  -MM           OT Goals    Bed Mobility Goal Selection (OT) bed mobility, OT goal 1  -MM        Grooming Goal Selection (OT) grooming, OT goal 1   -MM        Self-Feeding Goal Selection (OT) self feeding, OT goal 1  -MM        Additional Documentation Grooming Goal Selection (OT) (Row);Self-Feeding Goal Selection (OT) (Row)  -MM           Bed Mobility Goal 1 (OT)    Activity/Assistive Device (Bed Mobility Goal 1, OT) bed mobility activities, all  -MM        [place] Level/Cues Needed (Bed Mobility Goal 1, OT) moderate assist (50-74% patient effort);verbal cues required  -MM        Time Frame (Bed Mobility Goal 1, OT) 10 days  -MM        Progress/Outcomes (Bed Mobility Goal 1, OT) goal ongoing  -MM           Grooming Goal 1 (OT)    Activity/Device (Grooming Goal 1, OT) grooming skills, all  -MM        [place] (Grooming Goal 1, OT) minimum assist (75% or more patient effort);set-up required  -MM        Time Frame (Grooming Goal 1, OT) 10 days  -MM        Progress/Outcome (Grooming Goal 1, OT) goal ongoing  -MM           Self-Feeding Goal 1 (OT)    Activity/Assistive Device (Self-Feeding Goal 1, OT) self-feeding skills, all  -MM        [place] Level/Cues Needed (Self-Feeding Goal 1, OT) minimum assist (75% or more patient effort);set-up required  -MM        Time Frame (Self-Feeding Goal 1, OT) 10 days  -MM        Progress/Outcomes (Self-Feeding Goal 1, OT) goal ongoing  -MM           Living Environment    Home Accessibility stairs to enter home;tub/shower is not walk in  -MM          User Key  (r) = Recorded By, (t) = Taken By, (c) = Cosigned By    Initials Name Effective Dates    MM Obie Negrete, DAISYR/ENRIQUE 04/03/18 -            Occupational Therapy Education     Title: PT OT SLP Therapies (Active)     Topic: Occupational Therapy (Active)     Point: ADL training (Active)     Description: Instruct learner(s) on proper safety adaptation and remediation techniques during self care or transfers.   Instruct in proper use of assistive devices.   Learning Progress Summary     Learner Status Readiness Method Response Comment Documented by    Patient Active  Acceptance E NR OT role, benefits, POC, supporting LUE  08/12/18 1609                      User Key     Initials Effective Dates Name Provider Type Discipline     04/03/18 -  Obie Negrete, OTR/L Occupational Therapist OT                  OT Recommendation and Plan  Outcome Summary/Treatment Plan (OT)  Anticipated Discharge Disposition (OT): inpatient rehabilitation facility, skilled nursing facility  Planned Therapy Interventions (OT Eval): activity tolerance training, adaptive equipment training, BADL retraining, functional balance retraining, neuromuscular control/coordination retraining, occupation/activity based interventions, orthotic fabrication/fitting/training, patient/caregiver education/training, passive ROM/stretching, ROM/therapeutic exercise, strengthening exercise, transfer/mobility retraining  Therapy Frequency (OT Eval): daily  Plan of Care Review  Plan of Care Reviewed With: patient  Plan of Care Reviewed With: patient  Outcome Summary: OT eval completed. Pt reports no pain. Pt is A&O x4. Pt LUE is flaccid, Pt LLE is grossly flaccid with 1/5 MMT in foot. Pt was max to roll to L side. Pt was dependent to adjust socks. Pt had moderate numbness on L side. Skilled OT recommended to address coordination, functional mobility and adls. Recommended d/c inpatient rehab v. snf.          Outcome Measures     Row Name 08/12/18 1600             How much help from another is currently needed...    Putting on and taking off regular lower body clothing? 1  -MM      Bathing (including washing, rinsing, and drying) 1  -MM      Toileting (which includes using toilet bed pan or urinal) 1  -MM      Putting on and taking off regular upper body clothing 2  -MM      Taking care of personal grooming (such as brushing teeth) 2  -MM      Eating meals 2  -MM      Score 9  -MM         Modified [place] Scale    Modified [place] Scale 4 - Moderately severe disability.  Unable to walk without assistance, and unable to  attend to own bodily needs without assistance.  -MM         Functional Assessment    Outcome Measure Options AM-PAC 6 Clicks Daily Activity (OT);Modified [place]  -        User Key  (r) = Recorded By, (t) = Taken By, (c) = Cosigned By    Initials Name Provider Type    MM Obie Negrete, OTR/L Occupational Therapist          Time Calculation:   OT Start Time: 1413 (10 min chart review time from 8/11 & 8/12)  OT Stop Time: 1442  OT Time Calculation (min): 29 min  Therapy Suggested Charges     Code   Minutes Charges    None           Therapy Charges for Today     Code Description Service Date Service Provider Modifiers Qty    [id number]  OT SELFCARE CURRENT 8/12/2018 Obie Negrete, OTR/L GO, CL 1    [id number]  OT SELFCARE PROJECTED 8/12/2018 Obie Negrete, OTR/L GO, CK 1    [id number]  OT EVAL MOD COMPLEXITY 3 8/12/2018 Obie Negrete, OTR/L ESTUARDO, KX 1          OT G-codes  OT Professional Judgement Used?: Yes  OT Functional Scales Options: AM-PAC 6 Clicks Daily Activity (OT)  Score: 9/4  Functional Limitation: Self care  Self Care Current Status (): At least 60 percent but less than 80 percent impaired, limited or restricted  Self Care Goal Status (): At least 40 percent but less than 60 percent impaired, limited or restricted    ADRIANO Burris/ENRIQUE  8/12/2018

## 2018-08-12 NOTE — PLAN OF CARE
Problem: Fall Risk (Adult)  Goal: Absence of Fall  Outcome: Ongoing (interventions implemented as appropriate)      Problem: Skin Injury Risk (Adult)  Goal: Skin Health and Integrity  Outcome: Ongoing (interventions implemented as appropriate)      Problem: Patient Care Overview  Goal: Plan of Care Review  Outcome: Ongoing (interventions implemented as appropriate)   08/12/18 1450   Coping/Psychosocial   Plan of Care Reviewed With patient   Plan of Care Review   Progress no change   OTHER   Outcome Summary Pt A&Ox4. Pt flaccid on L side. Pt has been bradycardic this shift, SCDS for VTE. Tolerating mech soft diet with nectar thick liquids. Continue to monitor. Safety maintained.      Goal: Discharge Needs Assessment  Outcome: Ongoing (interventions implemented as appropriate)      Problem: Stroke (Ischemic) (Adult)  Goal: Signs and Symptoms of Listed Potential Problems Will be Absent, Minimized or Managed (Stroke)  Outcome: Ongoing (interventions implemented as appropriate)

## 2018-08-12 NOTE — PROGRESS NOTES
Discharge Planning Assessment  [facility]     Patient Name: Angelika Sapp  MRN: [id number]  Today's Date: 8/12/2018    Admit Date: 8/11/2018          Discharge Needs Assessment     Row Name 08/12/18 0816       Living Environment    Lives With spouse    Name(s) of Who Lives With Patient Patricia    Current Living Arrangements home/apartment/condo    Primary Care Provided by spouse/significant other;child(sue)    Provides Primary Care For no one, unable/limited ability to care for self    Family Caregiver if Needed child(sue), adult;spouse    Quality of Family Relationships helpful;involved;supportive    Able to Return to Prior Arrangements yes       Resource/Environmental Concerns    Resource/Environmental Concerns none       Transition Planning    Patient/Family Anticipates Transition to home with help/services;home with family;long term care facility    Patient/Family Anticipated Services at Transition home health care;skilled nursing    Transportation Anticipated family or friend will provide       Discharge Needs Assessment    Readmission Within the Last 30 Days no previous admission in last 30 days    Concerns to be Addressed no discharge needs identified    Equipment Currently Used at Home cane, quad    Anticipated Changes Related to Illness none    Equipment Needed After Discharge none    Discharge Coordination/Progress Pt has PCP and RX coverage.  Pt was admitted from [facility]; however, Karen at [facility] stated the family had picked up all pt's belongings.  Pt was confused when ask about dc plan/if he had been at [facility].  SW called pt's spouse, [phone].  She stated she was not sure if he would return home or need NHP at this time.  She asked that SW check back with her tomorrow.  SW will follow.            Discharge Plan    No documentation.       Destination     No service coordination in this encounter.      Durable Medical Equipment     No service coordination in this encounter.       Dialysis/Infusion     No service coordination in this encounter.      Home Medical Care     No service coordination in this encounter.      Social Care     No service coordination in this encounter.                Demographic Summary    No documentation.           Functional Status    No documentation.           Psychosocial    No documentation.           Abuse/Neglect    No documentation.           Legal    No documentation.           Substance Abuse    No documentation.           Patient Forms    No documentation.         GWEN Lares

## 2018-08-12 NOTE — PROGRESS NOTES
Neurology Progress Note      Chief Complaint:  Left sided weakness    Subjective     Subjective:    Results of his studies to date:  Melva shows a subacute infarction of the right basal ganglia extending up to the right paraventricular region.  No other acute changes are identified  He apparently had an MRI of his brain done at [facility] but I do not have a copy of that result.  I am trying to obtain the report.  Carotid ULTRASOUND did not show any hemodynamically significant stenosis  TTE was unremarkable, negative bubble study  Urinalysis looks ok    A1c 8.3  B 12 was low at 209  Medications:  Current Facility-Administered Medications   Medication Dose Route Frequency Provider Last Rate Last Dose   • aspirin chewable tablet 81 mg  81 mg Oral Daily Mick Francisco APRN   81 mg at 08/12/18 0810   • atorvastatin (LIPITOR) tablet 80 mg  80 mg Oral Nightly Mick Francisco APRN   80 mg at 08/11/18 2050   • carbidopa-levodopa (SINEMET)  MG per tablet 2 tablet  2 tablet Oral TID Mick Francisco APRN   2 tablet at 08/12/18 0810   • clopidogrel (PLAVIX) tablet 75 mg  75 mg Oral Daily Mick Francisco APRN   75 mg at 08/12/18 0810   • dextrose (D50W) solution 25 g  25 g Intravenous Q15 Min PRN Mick Francisco APRN       • dextrose (GLUTOSE) oral gel 15 g  15 g Oral Q15 Min PRN Mick Francisco APRN       • enalaprilat (VASOTEC) injection 0.625 mg  0.625 mg Intravenous Q6H PRN Mick Francisco APRN       • escitalopram (LEXAPRO) tablet 20 mg  20 mg Oral Daily Mick Francisco APRN   20 mg at 08/12/18 0810   • fenofibrate (TRICOR) tablet 145 mg  145 mg Oral Daily Mick Francisco APRN   145 mg at 08/12/18 0809   • glucagon (human recombinant) (GLUCAGEN DIAGNOSTIC) injection 1 mg  1 mg Subcutaneous PRN Mick Francisco APRN       • insulin detemir (LEVEMIR) injection 15 Units  15 Units Subcutaneous Nightly Mick Francisco APRN       • insulin lispro (humaLOG)  injection 0-9 Units  0-9 Units Subcutaneous 4x Daily With Meals & Nightly Mick Francisco APRN   6 Units at 08/12/18 1217   • losartan (COZAAR) tablet 100 mg  100 mg Oral Q24H Mick Francisco APRN   100 mg at 08/12/18 0809   • pantoprazole (PROTONIX) EC tablet 40 mg  40 mg Oral Daily Mick Francisco APRN   40 mg at 08/12/18 0809   • sodium chloride 0.9 % flush 1-10 mL  1-10 mL Intravenous PRN Mick Francisco APRN       • sodium chloride 0.9 % infusion  50 mL/hr Intravenous Continuous Mick Francisco APRN 50 mL/hr at 08/12/18 1217 50 mL/hr at 08/12/18 1217   • traZODone (DESYREL) tablet 150 mg  150 mg Oral Nightly Mick Francisco APRN   150 mg at 08/11/18 2050       Review of Systems:   -A 14 point review of systems is completed and is negative except for left arm and leg weakness, left numb numbness, left facial weakness.      Objective      Vital Signs  Temp:  [97.6 °F (36.4 °C)-98.6 °F (37 °C)] 98.4 °F (36.9 °C)  Heart Rate:  [50-59] 56  Resp:  [16-20] 20  BP: (125-147)/(58-69) 133/69    Physical Exam:    Mental Status: Mildly somnolent, alerts to my direction, oriented X 3, no aphasia, interactive  Cranial nerves: Left facial droop, mild dysarthria, left facial creased sensation  Motor: Increased tone in the left extremities, currently unable to lift either the left arm or the left leg up off the bed, strength 1/5  Sensory: Diminished over left face/arm/leg  Cerebellar: No ataxia  Gait: deferred for patient safety     Results Review:    I reviewed the patient's new clinical results.      Results from last 7 days  Lab Units 08/12/18  1000 08/11/18  0958   WBC 10*3/mm3 6.11 7.39   HEMOGLOBIN g/dL 13.9* 15.2   HEMATOCRIT % 40.7  40.4 42.5   PLATELETS 10*3/mm3 238  255 300          Results from last 7 days  Lab Units 08/12/18  1000 08/11/18  0958   SODIUM mmol/L 137 139   POTASSIUM mmol/L 4.6 4.1   CHLORIDE mmol/L 102 103   CO2 mmol/L 24.0 22.0*   BUN mg/dL 27* 26*   CREATININE mg/dL 1.29 1.48*    CALCIUM mg/dL 9.0 9.8   BILIRUBIN mg/dL  --  0.6   ALK PHOS U/L  --  44   ALT (SGPT) U/L  --  15   AST (SGOT) U/L  --  28   GLUCOSE mg/dL 297* 109*        Lab Results   Component Value Date    PHOS 3.8 08/11/2018    MG 2.0 08/11/2018    PROTIME 12.6 08/11/2018    INR 0.92 08/11/2018     No components found for: POCGLUC  No components found for: A1C  Lab Results   Component Value Date    HDL 30 (L) 08/12/2018     (H) 08/12/2018     No components found for: B12  Lab Results   Component Value Date    TSH 3.320 08/11/2018       Assessment/Plan     Hospital Problem List    Active Problems:    Cerebrovascular accident (CVA) (CMS/HCC)    Impression   Left face arm and leg hemiparesis and hemisensory deficit  Extension of acute ischemic stroke versus worsening of prior stroke symptoms, possibly due to hyperglycemia  B12 deficiency  Mild Parkinson's disease     Plan  Continue aspirin 81 mg daily, Plavix 75 mg daily, Lipitor 80 mg daily  Goal A1c below 7, primary service input appreciated for treatment of hypoglycemia  Will obtain a stat head CT to rule out bleed or acute process  Continue Permissive hypertension up to /90 for the next 24 hours  Trying to obtain the MRI report from the previous hospitalization at [facility], I asked the  to help with this - unable to do so today, as medical records office there is closed. I asked his wife to obtain a copy of the report.  PT/OT/ST    Jillian Ortiz MD  08/12/18  12:26 PM

## 2018-08-12 NOTE — PLAN OF CARE
Problem: Patient Care Overview  Goal: Plan of Care Review  Outcome: Ongoing (interventions implemented as appropriate)   08/12/18 6527   Coping/Psychosocial   Plan of Care Reviewed With patient   Plan of Care Review   Progress no change   OTHER   Outcome Summary OT eval completed. Pt reports no pain. Pt is A&O x4. Pt LUE is flaccid, Pt LLE is grossly flaccid with 1/5 MMT in foot. Pt was max to roll to L side. Pt was dependent to adjust socks. Pt had moderate numbness on L side. Skilled OT recommended to address coordination, functional mobility and adls. Recommended d/c inpatient rehab v. snf.

## 2018-08-12 NOTE — PLAN OF CARE
Problem: Fall Risk (Adult)  Goal: Identify Related Risk Factors and Signs and Symptoms  Outcome: Outcome(s) achieved Date Met: 08/12/18    Goal: Absence of Fall  Outcome: Ongoing (interventions implemented as appropriate)      Problem: Skin Injury Risk (Adult)  Goal: Identify Related Risk Factors and Signs and Symptoms  Outcome: Outcome(s) achieved Date Met: 08/12/18    Goal: Skin Health and Integrity  Outcome: Ongoing (interventions implemented as appropriate)      Problem: Patient Care Overview  Goal: Plan of Care Review  Outcome: Ongoing (interventions implemented as appropriate)   08/12/18 0314   Coping/Psychosocial   Plan of Care Reviewed With patient   Plan of Care Review   Progress no change   OTHER   Outcome Summary PT A&O, L extremities numb and flaccid except some movement in hands and feet. Turn w assist, no skin breakdown.        Problem: Stroke (Ischemic) (Adult)  Goal: Signs and Symptoms of Listed Potential Problems Will be Absent, Minimized or Managed (Stroke)  Outcome: Ongoing (interventions implemented as appropriate)

## 2018-08-13 LAB
ANION GAP SERPL CALCULATED.3IONS-SCNC: 7 MMOL/L (ref 4–13)
BASOPHILS # BLD AUTO: 0.03 10*3/MM3 (ref 0–0.2)
BASOPHILS NFR BLD AUTO: 0.5 % (ref 0–2)
BUN BLD-MCNC: 23 MG/DL (ref 5–21)
BUN/CREAT SERPL: 20 (ref 7–25)
CALCIUM SPEC-SCNC: 8.4 MG/DL (ref 8.4–10.4)
CHLORIDE SERPL-SCNC: 106 MMOL/L (ref 98–110)
CO2 SERPL-SCNC: 25 MMOL/L (ref 24–31)
CREAT BLD-MCNC: 1.15 MG/DL (ref 0.5–1.4)
DEPRECATED RDW RBC AUTO: 45.1 FL (ref 40–54)
EOSINOPHIL # BLD AUTO: 0.27 10*3/MM3 (ref 0–0.7)
EOSINOPHIL NFR BLD AUTO: 4.4 % (ref 0–4)
ERYTHROCYTE [DISTWIDTH] IN BLOOD BY AUTOMATED COUNT: 12.8 % (ref 12–15)
GFR SERPL CREATININE-BSD FRML MDRD: 61 ML/MIN/1.73
GLUCOSE BLD-MCNC: 182 MG/DL (ref 70–100)
GLUCOSE BLDC GLUCOMTR-MCNC: 170 MG/DL (ref 70–130)
GLUCOSE BLDC GLUCOMTR-MCNC: 193 MG/DL (ref 70–130)
GLUCOSE BLDC GLUCOMTR-MCNC: 213 MG/DL (ref 70–130)
GLUCOSE BLDC GLUCOMTR-MCNC: 305 MG/DL (ref 70–130)
HCT VFR BLD AUTO: 35.7 % (ref 40–52)
HGB BLD-MCNC: 12.3 G/DL (ref 14–18)
IMM GRANULOCYTES # BLD: 0.05 10*3/MM3 (ref 0–0.03)
IMM GRANULOCYTES NFR BLD: 0.8 % (ref 0–5)
LYMPHOCYTES # BLD AUTO: 1.59 10*3/MM3 (ref 0.72–4.86)
LYMPHOCYTES NFR BLD AUTO: 25.8 % (ref 15–45)
MCH RBC QN AUTO: 32.9 PG (ref 28–32)
MCHC RBC AUTO-ENTMCNC: 34.5 G/DL (ref 33–36)
MCV RBC AUTO: 95.5 FL (ref 82–95)
MONOCYTES # BLD AUTO: 0.76 10*3/MM3 (ref 0.19–1.3)
MONOCYTES NFR BLD AUTO: 12.3 % (ref 4–12)
NEUTROPHILS # BLD AUTO: 3.46 10*3/MM3 (ref 1.87–8.4)
NEUTROPHILS NFR BLD AUTO: 56.2 % (ref 39–78)
NRBC BLD MANUAL-RTO: 0 /100 WBC (ref 0–0)
PLATELET # BLD AUTO: 199 10*3/MM3 (ref 130–400)
PMV BLD AUTO: 9.9 FL (ref 6–12)
POTASSIUM BLD-SCNC: 4.3 MMOL/L (ref 3.5–5.3)
RBC # BLD AUTO: 3.74 10*6/MM3 (ref 4.8–5.9)
SODIUM BLD-SCNC: 138 MMOL/L (ref 135–145)
WBC NRBC COR # BLD: 6.16 10*3/MM3 (ref 4.8–10.8)

## 2018-08-13 PROCEDURE — 97535 SELF CARE MNGMENT TRAINING: CPT

## 2018-08-13 PROCEDURE — 80048 BASIC METABOLIC PNL TOTAL CA: CPT | Performed by: NURSE PRACTITIONER

## 2018-08-13 PROCEDURE — 63710000001 INSULIN LISPRO (HUMAN) PER 5 UNITS: Performed by: NURSE PRACTITIONER

## 2018-08-13 PROCEDURE — 63710000001 INSULIN DETEMIR PER 5 UNITS: Performed by: NURSE PRACTITIONER

## 2018-08-13 PROCEDURE — 97530 THERAPEUTIC ACTIVITIES: CPT

## 2018-08-13 PROCEDURE — G8978 MOBILITY CURRENT STATUS: HCPCS

## 2018-08-13 PROCEDURE — 92526 ORAL FUNCTION THERAPY: CPT

## 2018-08-13 PROCEDURE — 97163 PT EVAL HIGH COMPLEX 45 MIN: CPT

## 2018-08-13 PROCEDURE — 97110 THERAPEUTIC EXERCISES: CPT

## 2018-08-13 PROCEDURE — 82962 GLUCOSE BLOOD TEST: CPT

## 2018-08-13 PROCEDURE — G8979 MOBILITY GOAL STATUS: HCPCS

## 2018-08-13 PROCEDURE — 85025 COMPLETE CBC W/AUTO DIFF WBC: CPT | Performed by: NURSE PRACTITIONER

## 2018-08-13 RX ORDER — CYANOCOBALAMIN 1000 UG/ML
1000 INJECTION, SOLUTION INTRAMUSCULAR; SUBCUTANEOUS DAILY
Status: DISCONTINUED | OUTPATIENT
Start: 2018-08-14 | End: 2018-08-14 | Stop reason: HOSPADM

## 2018-08-13 RX ADMIN — CARBIDOPA AND LEVODOPA 2 TABLET: 25; 100 TABLET ORAL at 17:08

## 2018-08-13 RX ADMIN — ASPIRIN 81 MG 81 MG: 81 TABLET ORAL at 09:29

## 2018-08-13 RX ADMIN — INSULIN LISPRO 3 UNITS: 100 INJECTION, SOLUTION INTRAVENOUS; SUBCUTANEOUS at 21:17

## 2018-08-13 RX ADMIN — ESCITALOPRAM 20 MG: 10 TABLET, FILM COATED ORAL at 09:29

## 2018-08-13 RX ADMIN — ATORVASTATIN CALCIUM 80 MG: 40 TABLET, FILM COATED ORAL at 21:10

## 2018-08-13 RX ADMIN — INSULIN LISPRO 5 UNITS: 100 INJECTION, SOLUTION INTRAVENOUS; SUBCUTANEOUS at 17:08

## 2018-08-13 RX ADMIN — INSULIN LISPRO 10 UNITS: 100 INJECTION, SOLUTION INTRAVENOUS; SUBCUTANEOUS at 12:17

## 2018-08-13 RX ADMIN — CLOPIDOGREL 75 MG: 75 TABLET, FILM COATED ORAL at 09:29

## 2018-08-13 RX ADMIN — FENOFIBRATE 145 MG: 145 TABLET ORAL at 09:28

## 2018-08-13 RX ADMIN — LOSARTAN POTASSIUM 100 MG: 50 TABLET, FILM COATED ORAL at 09:29

## 2018-08-13 RX ADMIN — CARBIDOPA AND LEVODOPA 2 TABLET: 25; 100 TABLET ORAL at 09:28

## 2018-08-13 RX ADMIN — PANTOPRAZOLE SODIUM 40 MG: 40 TABLET, DELAYED RELEASE ORAL at 09:28

## 2018-08-13 RX ADMIN — INSULIN DETEMIR 25 UNITS: 100 INJECTION, SOLUTION SUBCUTANEOUS at 21:23

## 2018-08-13 RX ADMIN — TRAZODONE HYDROCHLORIDE 150 MG: 150 TABLET, FILM COATED ORAL at 21:11

## 2018-08-13 RX ADMIN — INSULIN LISPRO 2 UNITS: 100 INJECTION, SOLUTION INTRAVENOUS; SUBCUTANEOUS at 09:29

## 2018-08-13 RX ADMIN — SODIUM CHLORIDE 50 ML/HR: 9 INJECTION, SOLUTION INTRAVENOUS at 11:30

## 2018-08-13 RX ADMIN — CARBIDOPA AND LEVODOPA 2 TABLET: 25; 100 TABLET ORAL at 21:11

## 2018-08-13 RX ADMIN — Medication 500 MCG: at 09:29

## 2018-08-13 NOTE — DISCHARGE PLACEMENT REQUEST
"Call Back: Yolandankechi Rice, [phone]      Sinclair, Soniarodri NUNEZ (78 y.o. Male)     Date of Birth Social Security Number Address Home Phone MRN    1940  [address] [facility] [address] [phone] [id number]    [employer] Mar[employer] Status          Other        Admission Date Admission Type Admitting Provider Attending Provider Department, Room/Bed    8/11/18 Emergency Harris Patel DO Hancock, John C, DO [facility] 3A, 335/1    Discharge Date Discharge Disposition Discharge Destination                       Attending Provider:  Harris Patel DO    Allergies:  No Known Allergies    Isolation:  None   Infection:  None   Code Status:  CPR    Ht:  182.9 cm (72.01\")   Wt:  95.3 kg (210 lb)    Admission Cmt:  None   Principal Problem:  None                Active Insurance as of 8/11/2018     Primary Coverage     Payor Plan Insurance Group Employer/Plan Group    MEDICARE MEDICARE A & B      Payor Plan Address Payor Plan Phone Number Effective From Effective To    [address] [phone] 2/1/2003     [facility] [id number]       Subscriber Name Subscriber Birth Date Member ID       ANGELIKA SINCLAIR 1940 [id number]           Secondary Coverage     Payor Plan Insurance Group Employer/Plan Group    AETNA COMMERCIAL AETNA CENT[employer]ARE [id number]     Payor Plan Address Payor Plan Phone Number Effective From Effective To    [address] [phone] 1/1/2013     [facility] [id number]       Subscriber Name Subscriber Birth Date Member ID       ANGELIKA SINCLAIR 1940 [id number]                 Emergency Contacts      (Rel.) Home Phone Work Phone Mobile Phone    Chris Prietoine (Spouse) [phone] -- --    Freddy Aranda (Son) -- [phone] --            Insurance Information                MEDICARE/MEDICARE A & B Phone: [phone]    Subscriber: Angelika Sinclair Subscriber#: [id number]    Group#:  Precert#:         AETNA COMMERCIAL/AETNA CENTERCARE Phone: [phone]    " Subscriber: Angelika Sapp Subscriber#: [id number]    Group#: [id number] Precert#:              History & Physical      Mick Francisco APRN at 8/11/2018 12:20 PM     Attestation signed by Alex Martinez DO at 8/11/2018  2:06 PM    I personally evaluated and examined the patient in conjunction with BRENNA Gonzalez and agree with the assessment, treatment plan, and disposition of the patient as recorded by her. My history, exam, and further recommendations are:     Assessment/Plan:  1.  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  Hyperlipidemia              - Check Lipid profile              - Lipitor 80 mg nightly              - Continue Tricor     3.  Insulin dependent diabetes mellitus, type II              - Check HgbA1c              - Hold Insulin              - Accu checks ac/hs              - Currently NPO     4.  Parkinson's Disease              - Continue Sinemet      5.  Hypertension              - Resume ARB, Losartan substituted for Benicar per formulary               - Hydralazine 25 mg BID     6.  Chronic kidney disease, stage III              - NS @ 100 ml/hr               - Recheck BMP in AM     7.  Gastroesophageal Reflux disease              - Protonix     8.  Depression              - Resume Trazodone and Lexapro    Alex Martinez DO  08/11/18  2:05 PM                        [facility] Medicine Services  HISTORY AND PHYSICAL    Date of Admission: 8/11/2018  Primary Care Physician: Sergio Ma MD    Subjective     Chief  Complaint: Left-sided weakness    History of Present Illness  The patient is a 78-year-old  male who presented to [facility] emergency department complaining of left-sided weakness.  The patient has past medical history significant for a previous cerebrovascular accident, diabetes on insulin therapy, hyperlipidemia, hypertension, Parkinson's disease, depression and GERD.  It also appears that he has a form of chronic kidney disease however unsure of the stage at this point.  He previously had a stroke almost 2 years ago and suffered a left-sided weakness.  Since that time he has regained some use of his left upper and lower extremities.  He apparently suffered a fall on Sunday last week and was hospitalized at [facility] in [place], [place].  He was discharged yesterday from [facility] to [facility] nursing and rehabilitation.  The patient states that he developed increased weakness to his left side and increased numbness at around midnight and it progressively worsened throughout the night.  Her the nursing staff at [facility], the nurse's aide was able to dress him at 7:30 without any difficulty.  Then at 8:30 this morning he had profound left-sided facial weakness and drooling.  Per reports he was unable to chew his food and it was falling out of his mouth.  However the patient insists that it started at midnight, therefore he was out of the window for TPA since his onset was greater than 3-4 hours.  The patient does appear to be of sound mind and body at this time.  He does get a little confused about specifics when he is answering questions.  His granddaughter states that he falls a lot.  Wife, daughter and son-in-law are at the bedside.  The patient denies any shortness of breath, chest pain, nausea, vomiting or diarrhea.  His neurological exam reveals a flaccid left upper and left lower extremity.  He also has decreased sensation on that  left side as well.    Review of Systems   Constitutional: Positive for activity change and fatigue. Negative for appetite change, chills and fever.   HENT: Negative for hearing loss, nosebleeds, tinnitus and trouble swallowing.    Eyes: Negative for visual disturbance.   Respiratory: Negative for cough, chest tightness, shortness of breath and wheezing.    Cardiovascular: Negative for chest pain, palpitations and leg swelling.   Gastrointestinal: Negative for abdominal distention, abdominal pain, blood in stool, constipation, diarrhea, nausea and vomiting.   Endocrine: Negative for cold intolerance, heat intolerance, polydipsia, polyphagia and polyuria.   Genitourinary: Positive for urgency. Negative for decreased urine volume, difficulty urinating, dysuria, flank pain, frequency and hematuria.   Musculoskeletal: Negative for arthralgias, joint swelling and myalgias.   Skin: Negative for rash.   Allergic/Immunologic: Negative for immunocompromised state.   Neurological: Positive for weakness (left side flaccid) and numbness (left upper and lower extremities ). Negative for dizziness, tremors, syncope, light-headedness and headaches.   Hematological: Negative for adenopathy. Does not bruise/bleed easily.   Psychiatric/Behavioral: Negative for confusion and sleep disturbance. The patient is not nervous/anxious.       Otherwise complete ROS reviewed and negative except as mentioned in the HPI.    Past Medical History:   Past Medical History:   Diagnosis Date   • Arthritis    • CVA (cerebral vascular accident) (CMS/HCC)    • Depressive disorder    • Diabetes mellitus (CMS/HCC)    • GERD (gastroesophageal reflux disease)    • Hemiplegia (CMS/HCC)    • Hyperlipidemia    • Hypertension    • Parkinson's disease (CMS/HCC)    • Renal disorder      Past Surgical History:No past surgical history on file.     Social History:  reports that he has never smoked. He has quit using smokeless tobacco. His smokeless tobacco use  "included Chew. He reports that he does not drink alcohol or use drugs.    Family History: family history includes Alcohol abuse in his father; Alzheimer's disease in his mother.      Allergies:  No Known Allergies  Medications:  Prior to Admission medications    Medication Sig Start Date End Date Taking? Authorizing Provider   aspirin 81 MG chewable tablet Chew 81 mg Daily.   Yes Denise Smith MD   carbidopa-levodopa (SINEMET)  MG per tablet Take 2 tablets by mouth 3 (Three) Times a Day.   Yes Denise Smith MD   clopidogrel (PLAVIX) 75 MG tablet Take 75 mg by mouth Daily.   Yes Denise Smith MD   escitalopram (LEXAPRO) 20 MG tablet Take 20 mg by mouth Daily.   Yes Denise Smith MD   fenofibrate (TRIGLIDE) 160 MG tablet Take 160 mg by mouth Every Night.   Yes Denise Smith MD   hydrALAZINE (APRESOLINE) 25 MG tablet Take 25 mg by mouth 2 (Two) Times a Day.   Yes Denise Smith MD   insulin detemir (LEVEMIR) 100 UNIT/ML injection Inject 30 Units under the skin into the appropriate area as directed Every Night.   Yes Denise Smith MD   insulin glulisine (APIDRA) 100 UNIT/ML patient supplied pump Inject 35 Units under the skin into the appropriate area as directed Continuous.   Yes Denise Smith MD   olmesartan (BENICAR) 20 MG tablet Take 40 mg by mouth Daily.   Yes Denise Smith MD   pantoprazole (PROTONIX) 40 MG EC tablet Take 40 mg by mouth Daily.   Yes Denise Smith MD   pravastatin (PRAVACHOL) 40 MG tablet Take 40 mg by mouth Every Night.   Yes Denise Smith MD   topiramate (TOPAMAX) 25 MG tablet Take 25 mg by mouth Every Night.   Yes Denise Smith MD   traZODone (DESYREL) 150 MG tablet Take 150 mg by mouth Every Night.   Yes Denise Smith MD     Objective     Vital Signs: /60   Pulse 52   Temp 97.1 °F (36.2 °C) (Temporal Artery )   Resp 19   Ht 182.9 cm (72\")   Wt 102 kg (225 lb)   SpO2 96%   " BMI 30.52 kg/m²       Physical Exam   Constitutional: He is oriented to person, place, and time. He appears well-developed and well-nourished.   Obese   HENT:   Head: Normocephalic and atraumatic.   Eyes: Pupils are equal, round, and reactive to light. Conjunctivae and EOM are normal.   Neck: Neck supple. No JVD present. No thyromegaly present.   Cardiovascular: Regular rhythm, normal heart sounds and intact distal pulses.  Bradycardia present.  Exam reveals no gallop and no friction rub.    No murmur heard.  Sinus kaela 54   Pulmonary/Chest: Effort normal and breath sounds normal. No respiratory distress. He has no wheezes. He has no rales. He exhibits no tenderness.   Diminished bilateral bases   Abdominal: Soft. Bowel sounds are normal. He exhibits distension. There is no tenderness. There is no rebound and no guarding.   Musculoskeletal: He exhibits no edema, tenderness or deformity.   Lymphadenopathy:     He has no cervical adenopathy.   Neurological: He is alert and oriented to person, place, and time. A sensory deficit (left side) is present. No cranial nerve deficit.   Left upper and lower extremity flaccid   Skin: Skin is warm and dry. No rash noted.   Psychiatric: He has a normal mood and affect. His behavior is normal. Judgment and thought content normal.   Nursing note and vitals reviewed.    Results Reviewed:  Lab Results (last 24 hours)     Procedure Component Value Units Date/Time    [place] Draw [[id number]] Collected:  08/11/18 0958    Specimen:  Blood Updated:  08/11/18 1100    Narrative:       The following orders were created for panel order [place] Draw.  Procedure                               Abnormality         Status                     ---------                               -----------         ------                     Light Blue Top[[id number]]                                   Final result               Green Top (Gel)[[id number]]                                  Final result                Lavender Top[[id number]]                                     Final result               Red Top[[id number]]                                          Final result                 Please view results for these tests on the individual orders.    Light Blue Top [[id number]] Collected:  08/11/18 0958    Specimen:  Blood from Arm, Left Updated:  08/11/18 1100     Extra Tube hold for add-on     Comment: Auto resulted       Green Top (Gel) [[id number]] Collected:  08/11/18 0958    Specimen:  Blood from Arm, Left Updated:  08/11/18 1100     Extra Tube Hold for add-ons.     Comment: Auto resulted.       Lavender Top [[id number]] Collected:  08/11/18 0958    Specimen:  Blood from Arm, Left Updated:  08/11/18 1100     Extra Tube hold for add-on     Comment: Auto resulted       Red Top [[id number]] Collected:  08/11/18 0958    Specimen:  Blood from Arm, Left Updated:  08/11/18 1100     Extra Tube Hold for add-ons.     Comment: Auto resulted.       aPTT [[id number]]  (Normal) Collected:  08/11/18 0958    Specimen:  Blood from Arm, Left Updated:  08/11/18 1048     PTT 27.9 seconds     Protime-INR [[id number]]  (Normal) Collected:  08/11/18 0958    Specimen:  Blood from Arm, Left Updated:  08/11/18 1048     Protime 12.6 Seconds      INR 0.92    Troponin [[id number]]  (Normal) Collected:  08/11/18 0958    Specimen:  Blood from Arm, Left Updated:  08/11/18 1035     Troponin I 0.015 ng/mL     Comprehensive Metabolic Panel [[id number]]  (Abnormal) Collected:  08/11/18 0958    Specimen:  Blood from Arm, Left Updated:  08/11/18 1025     Glucose 109 (H) mg/dL      BUN 26 (H) mg/dL      Creatinine 1.48 (H) mg/dL      Sodium 139 mmol/L      Potassium 4.1 mmol/L      Chloride 103 mmol/L      CO2 22.0 (L) mmol/L      Calcium 9.8 mg/dL      Total Protein 7.5 g/dL      Albumin 4.50 g/dL      ALT (SGPT) 15 U/L      AST (SGOT) 28 U/L      Alkaline Phosphatase 44 U/L      Total Bilirubin 0.6 mg/dL      eGFR Non African Amer 46 (L) mL/min/1.73       Globulin 3.0 gm/dL      A/G Ratio 1.5 g/dL      BUN/Creatinine Ratio 17.6     Anion Gap 14.0 (H) mmol/L     Narrative:       The MDRD GFR formula is only valid for adults with stable renal function between ages 18 and 70.    CBC & Differential [[id number]] Collected:  08/11/18 0958    Specimen:  Blood Updated:  08/11/18 1012    Narrative:       The following orders were created for panel order CBC & Differential.  Procedure                               Abnormality         Status                     ---------                               -----------         ------                     CBC Auto Differential[[id number]]        Abnormal            Final result                 Please view results for these tests on the individual orders.    CBC Auto Differential [[id number]]  (Abnormal) Collected:  08/11/18 0958    Specimen:  Blood from Arm, Left Updated:  08/11/18 1012     WBC 7.39 10*3/mm3      RBC 4.59 (L) 10*6/mm3      Hemoglobin 15.2 g/dL      Hematocrit 42.5 %      MCV 92.6 fL      MCH 33.1 (H) pg      MCHC 35.8 g/dL      RDW 12.9 %      RDW-SD 43.7 fl      MPV 10.1 fL      Platelets 300 10*3/mm3      Neutrophil % 60.7 %      Lymphocyte % 24.0 %      Monocyte % 12.3 (H) %      Eosinophil % 1.8 %      Basophil % 0.7 %      Immature Grans % 0.5 %      Neutrophils, Absolute 4.49 10*3/mm3      Lymphocytes, Absolute 1.77 10*3/mm3      Monocytes, Absolute 0.91 10*3/mm3      Eosinophils, Absolute 0.13 10*3/mm3      Basophils, Absolute 0.05 10*3/mm3      Immature Grans, Absolute 0.04 (H) 10*3/mm3      nRBC 0.0 /100 WBC     POC Glucose Once [[id number]]  (Normal) Collected:  08/11/18 0947    Specimen:  Blood Updated:  08/11/18 1006     Glucose 130 mg/dL      Comment: : [id number] Stanley Hitchcock ID: [id number]           Imaging Results (last 24 hours)     Procedure Component Value Units Date/Time    CT Head Without Contrast [[id number]] Collected:  08/11/18 1044     Updated:  08/11/18 1048    Narrative:        EXAMINATION:   CT HEAD WO CONTRAST-  8/11/2018 10:44 AM CDT     CT BRAIN without contrast 8/11/2018 10:25 AM CDT     HISTORY: Weakness     COMPARISON: None      DLP: 661 mGy cm     TECHNIQUE: Serial axial tomographic images of the brain were obtained  without the use of intravenous contrast.      FINDINGS:   The midline structures are nondisplaced. There is moderate cerebral and  cerebellar volume loss, with an associated increase in the prominence of  the ventricles and sulci. The basilar cisterns are normal in size and  configuration. There is no evidence of intracranial hemorrhage or  mass-effect. There is low attenuation in the periventricular white  matter, consistent with chronic ischemic change. There are no abnormal  extra-axial fluid collections. There is no evidence of tonsillar  herniation.      The included orbits and their contents are unremarkable. The visualized  paranasal sinuses, mastoid air cells and middle ear cavities are clear.  The visualized osseous structures and overlying soft tissues of the  skull and face are intact.        Impression:       Changes of aging with no acute intracranial abnormality  This report was finalized on 08/11/2018 10:45 by Dr. Cesar Rodriguez MD.    XR Chest 1 View [[id number]] Collected:  08/11/18 1020     Updated:  08/11/18 1023    Narrative:       EXAMINATION:   XR CHEST 1 VW-  8/11/2018 10:20 AM CDT     HISTORY: Left-sided weakness     Frontal upright radiograph of the chest 8/11/2018 10:18 AM CDT     COMPARISON: None.     FINDINGS:   The lungs are clear. The cardiomediastinal silhouette and pulmonary  vascularity are within normal limits.      The osseous structures and surrounding soft tissues demonstrate no acute  abnormality.       Impression:       1. No radiographic evidence of acute cardiopulmonary process.        This report was finalized on 08/11/2018 10:20 by Dr. Cesar Rodriguez MD.        I have personally reviewed and interpreted the radiology studies  and ECG obtained at time of admission.     Assessment / Plan     Assessment/Plan:  1.  Transient Ischemic attach suspected cerebral vascular accident   - Consult Neurology   - MRI brain without contrast STAT   - 2D echocardiogram with bubble study   - Bilateral Carotid Duplex   - PT/OT   - Speech evaluation, failed RN bedside swallow   - Check Lipid profile   - Check HghA1c   - Resume ASA and Plavix   - Hold Lovenox for DVT prophylaxis     2.  Hyperlipidemia   - Check Lipid profile   - Lipitor 80 mg nightly   - Continue Tricor    3.  Insulin dependent diabetes mellitus, type II   - Check HgbA1c   - Hold Insulin   - Accu checks ac/hs   - Currently NPO    4.  Parkinson's Disease   - Continue Sinemet     5.  Hypertension   - Resume ARB, Losartan substituted for Benicar per formulary    - Hydralazine 25 mg BID    6.  Chronic kidney disease, stage III   - NS @ 100 ml/hr    - Recheck BMP in AM    7.  Gastroesophageal Reflux disease   - Protonix    8.  Depression   - Resume Trazodone and Lexapro    Code Status: Full     I discussed the patient's findings and my recommendations with the patient, family and Dr. Martinez.    Estimated length of stay 2-3 days.    BRENNA Trinh   08/11/18   12:34 PM              Electronically signed by Alex Martinez DO at 8/11/2018  2:06 PM             ICU Vital Signs     Row Name 08/13/18 1217 08/13/18 0800 08/13/18 0740 08/13/18 0305 08/12/18 2325       Vitals    Temp 98.7 °F (37.1 °C)  -- 97.8 °F (36.6 °C) 98.3 °F (36.8 °C) 98.1 °F (36.7 °C)    Temp src Oral  -- Oral Oral Oral    Pulse 55  -- 53 53 56    Heart Rate Source Monitor  -- Monitor Monitor Monitor    Resp 18  -- 18 20 20    Resp Rate Source Visual  -- Visual Visual Visual    /60  -- 178/82 152/64 156/62    BP Location Right arm  -- Right arm Right arm Right arm    BP Method Automatic  -- Automatic Automatic Automatic    Patient Position Lying  -- Lying Lying Lying       Oxygen Therapy    SpO2 95 %  -- 94 % 92  % 93 %    Pulse Oximetry Type Continuous  -- Continuous Continuous Continuous    Device (Oxygen Therapy) room air room air room air room air room air    Row Name 08/12/18 2041 08/12/18 1958 08/12/18 1952 08/12/18 1656          Vitals    Temp 98.1 °F (36.7 °C)  --  -- 98.7 °F (37.1 °C)     Temp src Oral  --  -- Oral     Pulse 60  -- 64 78     Heart Rate Source Monitor  -- Monitor Monitor     Resp 18  -- 18 18     Resp Rate Source Visual  -- Visual Visual     /64  -- 137/70 149/85     BP Location Right arm  -- Right arm Right arm     BP Method Automatic  -- Automatic Automatic     Patient Position Lying  -- Lying Lying        Oxygen Therapy    SpO2 96 %  -- 94 % 95 %     Pulse Oximetry Type Continuous  -- Continuous Continuous     Device (Oxygen Therapy) room air room air room air room air         Prior to Admission Medications     Prescriptions Last Dose Informant Patient Reported? Taking?    aspirin 81 MG chewable tablet Past Week Nursing Home Yes Yes    Chew 81 mg Daily.    captopril (CAPOTEN) 25 MG tablet Past Week Family Member Yes Yes    Take 25 mg by mouth 3 (Three) Times a Day.    carbidopa-levodopa (SINEMET)  MG per tablet Past Week Nursing Home Yes Yes    Take 2 tablets by mouth 3 (Three) Times a Day.    clopidogrel (PLAVIX) 75 MG tablet Past Week Nursing Home Yes Yes    Take 75 mg by mouth Daily.    escitalopram (LEXAPRO) 20 MG tablet Past Week Nursing Home Yes Yes    Take 20 mg by mouth Daily.    fenofibrate (TRIGLIDE) 160 MG tablet Past Week Nursing Home Yes Yes    Take 160 mg by mouth Every Night.    hydrALAZINE (APRESOLINE) 25 MG tablet Past Week Nursing Home Yes Yes    Take 25 mg by mouth 2 (Two) Times a Day.    insulin detemir (LEVEMIR) 100 UNIT/ML injection Past Week Nursing Home Yes Yes    Inject 30 Units under the skin into the appropriate area as directed Every Night.    insulin glulisine (APIDRA) 100 UNIT/ML injection Past Week Nursing Home Yes Yes    Inject 35 Units under the skin  into the appropriate area as directed Daily With Breakfast.    metFORMIN (GLUCOPHAGE) 1000 MG tablet Past Week Family Member Yes Yes    Take 1,000 mg by mouth 2 (Two) Times a Day With Meals.    metoprolol succinate XL (TOPROL-XL) 25 MG 24 hr tablet Past Week Nursing Home Yes Yes    Take 25 mg by mouth Every Night.    olmesartan (BENICAR) 20 MG tablet Past Week Nursing Home Yes Yes    Take 40 mg by mouth Daily.    pantoprazole (PROTONIX) 40 MG EC tablet Past Week Nursing Home Yes Yes    Take 40 mg by mouth Daily.    pravastatin (PRAVACHOL) 40 MG tablet Past Week Nursing Home Yes Yes    Take 40 mg by mouth Every Night.    traZODone (DESYREL) 150 MG tablet Past Week Nursing Home Yes Yes    Take 150 mg by mouth Every Night.          Hospital Medications (active)       Dose Frequency Start End    aspirin chewable tablet 81 mg 81 mg Daily 8/11/2018     Sig - Route: Chew 1 tablet Daily. - Oral    atorvastatin (LIPITOR) tablet 80 mg 80 mg Nightly 8/11/2018     Sig - Route: Take 2 tablets by mouth Every Night. - Oral    carbidopa-levodopa (SINEMET)  MG per tablet 2 tablet 2 tablet 3 Times Daily 8/11/2018     Sig - Route: Take 2 tablets by mouth 3 (Three) Times a Day. - Oral    clopidogrel (PLAVIX) tablet 75 mg 75 mg Daily 8/11/2018     Sig - Route: Take 1 tablet by mouth Daily. - Oral    dextrose (D50W) solution 25 g 25 g Every 15 Minutes PRN 8/11/2018     Sig - Route: Infuse 50 mL into a venous catheter Every 15 (Fifteen) Minutes As Needed for Low Blood Sugar (Blood Sugar Less Than 70). - Intravenous    dextrose (GLUTOSE) oral gel 15 g 15 g Every 15 Minutes PRN 8/11/2018     Sig - Route: Take 15 g by mouth Every 15 (Fifteen) Minutes As Needed for Low Blood Sugar (Blood sugar less than 70). - Oral    enalaprilat (VASOTEC) injection 0.625 mg 0.625 mg Every 6 Hours PRN 8/11/2018     Sig - Route: Infuse 0.5 mL into a venous catheter Every 6 (Six) Hours As Needed for High Blood Pressure. - Intravenous    escitalopram  (LEXAPRO) tablet 20 mg 20 mg Daily 8/11/2018     Sig - Route: Take 2 tablets by mouth Daily. - Oral    fenofibrate (TRICOR) tablet 145 mg 145 mg Daily 8/11/2018     Sig - Route: Take 1 tablet by mouth Daily. - Oral    glucagon (human recombinant) (GLUCAGEN DIAGNOSTIC) injection 1 mg 1 mg As Needed 8/11/2018     Sig - Route: Inject 1 mg under the skin into the appropriate area as directed As Needed (Blood Glucose Less Than 70). - Subcutaneous    insulin detemir (LEVEMIR) injection 25 Units 25 Units Nightly 8/13/2018     Sig - Route: Inject 25 Units under the skin into the appropriate area as directed Every Night. - Subcutaneous    insulin lispro (humaLOG) injection 0-14 Units 0-14 Units 4 Times Daily With Meals & Nightly 8/13/2018     Sig - Route: Inject 0-14 Units under the skin into the appropriate area as directed 4 (Four) Times a Day With Meals & at Bedtime. - Subcutaneous    losartan (COZAAR) tablet 100 mg 100 mg Every 24 Hours Scheduled 8/11/2018     Sig - Route: Take 2 tablets by mouth Daily. - Oral    pantoprazole (PROTONIX) EC tablet 40 mg 40 mg Daily 8/11/2018     Sig - Route: Take 1 tablet by mouth Daily. - Oral    sodium chloride 0.9 % flush 1-10 mL 1-10 mL As Needed 8/11/2018     Sig - Route: Infuse 1-10 mL into a venous catheter As Needed for Line Care. - Intravenous    traZODone (DESYREL) tablet 150 mg 150 mg Nightly 8/11/2018     Sig - Route: Take 1 tablet by mouth Every Night. - Oral    vitamin B-12 (CYANOCOBALAMIN) tablet 500 mcg 500 mcg Daily 8/12/2018     Sig - Route: Take 1 tablet by mouth Daily. - Oral    insulin detemir (LEVEMIR) injection 15 Units (Discontinued) 15 Units Nightly 8/12/2018 8/13/2018    Sig - Route: Inject 15 Units under the skin into the appropriate area as directed Every Night. - Subcutaneous    insulin detemir (LEVEMIR) injection 20 Units (Discontinued) 20 Units Nightly 8/13/2018 8/13/2018    Sig - Route: Inject 20 Units under the skin into the appropriate area as directed  Every Night. - Subcutaneous    insulin lispro (humaLOG) injection 0-9 Units (Discontinued) 0-9 Units 4 Times Daily With Meals & Nightly 8/11/2018 8/13/2018    Sig - Route: Inject 0-9 Units under the skin into the appropriate area as directed 4 (Four) Times a Day With Meals & at Bedtime. - Subcutaneous    sodium chloride 0.9 % infusion (Discontinued) 50 mL/hr Continuous 8/11/2018 8/13/2018    Sig - Route: Infuse 50 mL/hr into a venous catheter Continuous. - Intravenous             Physician Progress Notes (most recent note)      Mick Francisco APRN at 8/13/2018  1:09 PM              [facility] Medicine Services  INPATIENT PROGRESS NOTE    Patient Name: Angelika Sapp  Date of Admission: 8/11/2018  Today's Date: 08/13/18  Length of Stay: 2  Primary Care Physician: Sergio Ma MD    Subjective   Chief Complaint: left sided weakness    HPI   Currently sitting up in bed.  No family in room when I saw the patient the first time today around 0730.  Still having decreased sensation and movement in his left upper and lower extremity.  He is not having any chest pain or shortness of breath.  Went back up to his room this afternoon after reviewing records from [facility] [place].  Discussed with wife results as well as treatment plan.  She wishes for him to be listed at [facility] since they live in [place] and they are now realizing that this is going not to be a quick recovery.     Review of Systems   Constitutional: Positive for activity change. Negative for appetite change.   HENT: Negative for hearing loss, nosebleeds, tinnitus and trouble swallowing.    Eyes: Negative for visual disturbance.   Respiratory: Negative for chest tightness, shortness of breath and wheezing.    Cardiovascular: Negative for palpitations and leg swelling.   Gastrointestinal: Negative for abdominal distention, blood in stool, constipation and diarrhea.   Endocrine: Negative for cold intolerance, heat intolerance,  polydipsia, polyphagia and polyuria.   Genitourinary: Negative for decreased urine volume, difficulty urinating, dysuria, flank pain, frequency and hematuria.   Allergic/Immunologic: Negative for immunocompromised state.   Neurological: Positive for light-headedness. Negative for dizziness and syncope.   Hematological: Negative for adenopathy. Does not bruise/bleed easily.   Psychiatric/Behavioral: Positive for confusion (intermittently ). Negative for sleep disturbance. The patient is not nervous/anxious.       All pertinent negatives and positives are as above. All other systems have been reviewed and are negative unless otherwise stated.     Objective    Temp:  [97.8 °F (36.6 °C)-98.7 °F (37.1 °C)] 98.7 °F (37.1 °C)  Heart Rate:  [53-78] 55  Resp:  [18-20] 18  BP: (137-178)/(60-85) 156/60     Physical Exam   Constitutional: He is oriented to person, place, and time. He appears well-developed and well-nourished.   Obese. NAD.  Sitting up in bed.     HENT:   Head: Normocephalic and atraumatic.   Eyes: Pupils are equal, round, and reactive to light. Conjunctivae and EOM are normal.   Neck: Neck supple. No JVD present. No thyromegaly present.   Cardiovascular: Regular rhythm, normal heart sounds and intact distal pulses.  Bradycardia present.  Exam reveals no gallop and no friction rub.    No murmur heard.  SB 50-71   Pulmonary/Chest: Effort normal and breath sounds normal. No respiratory distress. He has no wheezes. He has no rales. He exhibits no tenderness.   diminished    Abdominal: Soft. Bowel sounds are normal. He exhibits distension (obese abdomen ). There is no tenderness. There is no rebound and no guarding.   Musculoskeletal: He exhibits no edema, tenderness or deformity.   Lymphadenopathy:     He has no cervical adenopathy.   Neurological: He is alert and oriented to person, place, and time. A sensory deficit is present.   Left upper and lower extremity flaccid    Skin: Skin is warm and dry. No rash noted.    Psychiatric: He has a normal mood and affect. His behavior is normal. Judgment and thought content normal.   Nursing note and vitals reviewed.    Results Review:  I have reviewed the labs, radiology results, and diagnostic studies.    Laboratory Data:     Results from last 7 days  Lab Units 08/13/18  0643 08/12/18  1000 08/11/18  0958   WBC 10*3/mm3 6.16 6.11 7.39   HEMOGLOBIN g/dL 12.3* 13.9* 15.2   HEMATOCRIT % 35.7* 40.7  40.4 42.5   PLATELETS 10*3/mm3 199 238  255 300       Results from last 7 days  Lab Units 08/13/18  0643 08/12/18  1000 08/11/18  0958   SODIUM mmol/L 138 137 139   POTASSIUM mmol/L 4.3 4.6 4.1   CHLORIDE mmol/L 106 102 103   CO2 mmol/L 25.0 24.0 22.0*   BUN mg/dL 23* 27* 26*   CREATININE mg/dL 1.15 1.29 1.48*   CALCIUM mg/dL 8.4 9.0 9.8   BILIRUBIN mg/dL  --   --  0.6   ALK PHOS U/L  --   --  44   ALT (SGPT) U/L  --   --  15   AST (SGOT) U/L  --   --  28   GLUCOSE mg/dL 182* 297* 109*       Results from last 7 days  Lab Units 08/12/18  1000   CHOLESTEROL mg/dL 167   TRIGLYCERIDES mg/dL 198*   HDL CHOL mg/dL 30*       Results from last 7 days  Lab Units 08/12/18  1000   LDL CHOL mg/dL 101*     Radiology Data:   Imaging Results (last 24 hours)     Procedure Component Value Units Date/Time    US Carotid Bilateral [[id number]] Collected:  08/13/18 1137     Updated:  08/13/18 1141    Narrative:       History: Stroke       Impression:       Impression:  1. There is less than 50% stenosis of the right internal carotid artery.  2. There is less than 50% stenosis of the left internal carotid artery.  3. Antegrade flow is demonstrated in bilateral vertebral arteries.     Comments: Bilateral carotid vertebral arterial duplex scan was  performed.     Grayscale imaging shows intimal thickening and calcified elements at the  carotid bifurcation. The right internal carotid artery peak systolic  velocity is 51.9 cm/sec. The end-diastolic velocity is 13 cm/sec. The  right ICA/CCA ratio is approximately 0.65  . These findings correlate  with less than 50% stenosis of the right internal carotid artery.     Grayscale imaging shows intimal thickening and calcified elements at the  carotid bifurcation. The left internal carotid artery peak systolic  velocity is 79 cm/sec. The end-diastolic velocity is 22.4 cm/sec. The  left ICA/CCA ratio is approximately 0.72 . These findings correlate with  less than 50% stenosis of the left internal carotid artery.     Antegrade flow is demonstrated in bilateral vertebral arteries.  There is greater than 50% stenosis of the left external carotid artery.  This report was finalized on 08/13/2018 11:38 by Dr. Yo Cohen MD.    CT Head Without Contrast [[id number]] Collected:  08/12/18 1703     Updated:  08/12/18 1708    Narrative:       CT BRAIN without contrast 8/12/2018 4:34 PM CDT     HISTORY: Worsening neuro deficit     COMPARISON: August 11, 2018      DLP: 710 mGy cm     TECHNIQUE: Serial axial tomographic images of the brain were obtained  without the use of intravenous contrast.      FINDINGS:   The midline structures are nondisplaced. There is mild to moderate  cerebral and cerebellar volume loss, with an associated increase in the  prominence of the ventricles and sulci. The basilar cisterns are normal  in size and configuration. There is no evidence of intracranial  hemorrhage or mass-effect. There is low attenuation in the  periventricular white matter, consistent with chronic ischemic change.  Right basal ganglia lacunar infarctions noted. There are no abnormal  extra-axial fluid collections. There is no evidence of tonsillar  herniation.      The included orbits and their contents are unremarkable. The visualized  paranasal sinuses, mastoid air cells and middle ear cavities are clear.  The visualized osseous structures and overlying soft tissues of the  skull and face are intact.        Impression:       Changes of aging stable since August 11, 2018 no acute  intracranial  abnormality..        This report was finalized on 08/12/2018 17:05 by Dr. Cesar Rodriguez MD.          I have reviewed the patient's current medications.     Assessment/Plan     Assessment/Plan:  1.  Right lacunar infarct with extension into the right paraventricular region in the right hemisphere              - Appreciate Neurology's assistance              - MRI brain: right basal ganglia stroke with extension into the right paraventricular region.  [facility] [place] MRI report shows right basal ganglia stroke as well, likely extension of previous stroke last Sunday.               - 2D echocardiogram with bubble study: Negative bubble study, LVEF 65%, diastolic dysfunctioni. Mild AS.              - Bilateral Carotid Duplex: Negative              - PT/OT              - Speech evaluation: soft foods with nectar thick liquids               - Lipid profile:               - HghA1c 8.3              - Resume ASA and Plavix.              - Hold Lovenox for DVT prophylaxis, SCDs BLE      2.  Hyperlipidemia              - Lipid profile:               - Lipitor 80 mg nightly              - Continue Tricor     3.  Insulin dependent diabetes mellitus, type II, with hyperglycemia               - HgbA1c 8.3              - Insulin Levemir 25 units nightly              - Accu checks ac/hs              - Glucoses: 189, 182, 170, 305.     4.  Parkinson's Disease              - Continue Sinemet      5.  Hypertension              - Resume ARB, Losartan substituted for Benicar per formulary    - Permissive HTN, Hydralazine on hold      6.  Chronic kidney disease, stage III              - Discontinue IV fluids               - Recheck BMP in AM     7.  Gastroesophageal Reflux disease              - Protonix     8.  Depression              - Resume Trazodone and Lexapro    Discharge Planning: I expect the patient to be discharged to [facility] in 1-2 days.    Mick Francisco, BRENNA   08/13/18   1:09 PM      Electronically  signed by Mick Francisco APRN at 2018  1:22 PM          Physical Therapy Notes (most recent note)      Abdullahi Magaña, PT at 2018 11:54 AM  Version 1 of 1         Acute Care - Physical Therapy Initial Evaluation   [place]     Patient Name: Angelika Sapp  : 1940  MRN: [id number]  Today's Date: 2018   Onset of Illness/Injury or Date of Surgery: 18  Date of Referral to PT: 18  Referring Physician: Mick Francisco      Admit Date: 2018    Visit Dx:     ICD-10-CM ICD-9-CM   1. Cerebrovascular accident (CVA), unspecified mechanism (CMS/HCC) I63.9 434.91   2. Oropharyngeal dysphagia R13.12 787.22   3. Impaired mobility and ADLs Z74.09 799.89   4. Impaired mobility Z74.09 799.89     Patient Active Problem List   Diagnosis   • Cerebrovascular accident (CVA) (CMS/HCC)     Past Medical History:   Diagnosis Date   • Arthritis    • CVA (cerebral vascular accident) (CMS/HCC)    • Depressive disorder    • Diabetes mellitus (CMS/HCC)    • GERD (gastroesophageal reflux disease)    • Hemiplegia (CMS/HCC)    • Hyperlipidemia    • Hypertension    • Parkinson's disease (CMS/HCC)    • Renal disorder      History reviewed. No pertinent surgical history.     PT ASSESSMENT (last 12 hours)      Physical Therapy Evaluation     Row Name 18 1100          PT Evaluation Time/Intention    Subjective Information complains of;weakness;fatigue  -     Document Type evaluation  -     Mode of Treatment physical therapy  -     Row Name 18 1100          General Information    Patient Profile Reviewed? yes  -     Onset of Illness/Injury or Date of Surgery 18  -     Referring Physician Mick Francisco  -     Patient Observations alert;cooperative;agree to therapy  -     General Observations of Patient sidelying in bed  -     Prior Level of Function independent:;all household mobility;min assist:;mod assist:;ADL's  -     Equipment Currently Used at Home wheelchair;walker, rolling;cane,  straight;shower chair  -     Pertinent History of Current Functional Problem R basal ganglia infarct w/ extension to R paraventricular region   -     Existing Precautions/Restrictions fall   L shoulder subluxed  -     Equipment Issued to Patient walker, kaycee  -     Risks Reviewed patient:;LOB;nausea/vomiting;dizziness;increased discomfort  -     Benefits Reviewed patient:;improve function;increase independence;increase strength;increase balance  -     Barriers to Rehab medically complex;previous functional deficit  -     Row Name 08/13/18 1100          Relationship/Environment    Primary Source of Support/Comfort spouse  -     Lives With spouse  -[facility] Name 08/13/18 1100          Resource/Environmental Concerns    Current Living Arrangements home/apartment/condo  -[facility] Name 08/13/18 1100          Home Main Entrance    Number of Stairs, Main Entrance two  -     Stair Railings, Main Entrance none  -     Row Name 08/13/18 1100          Cognitive Assessment/Interventions    Additional Documentation Cognitive Assessment/Intervention (Group)  -     Row Name 08/13/18 1100          Cognitive Assessment/Intervention- PT/OT    Affect/Mental Status (Cognitive) WNL  -     Orientation Status (Cognition) oriented x 4  -     Row Name 08/13/18 1100          Safety Issues, Functional Mobility    Impairments Affecting Function (Mobility) balance;coordination;endurance/activity tolerance;motor control;motor planning;muscle tone abnormal;postural/trunk control;strength  -     Row Name 08/13/18 1100          Bed Mobility Assessment/Treatment    Bed Mobility Assessment/Treatment sidelying-sit;sit-sidelying  -     Sidelying-Sit [place] (Bed Mobility) verbal cues;moderate assist (50% patient effort)  -     Sit-Sidelying [place] (Bed Mobility) verbal cues;moderate assist (50% patient effort);maximum assist (25% patient effort)  -     Bed Mobility, Safety Issues decreased use of arms for  pushing/pulling;decreased use of legs for bridging/pushing;impaired trunk control for bed mobility  -     Assistive Device (Bed Mobility) draw sheet;head of bed elevated;bed rails  -LH     Row Name 08/13/18 1100          Transfer Assessment/Treatment    Comment (Transfers) unable to assess  -LH     Row Name 08/13/18 1100          General ROM    GENERAL ROM COMMENTS WFL, L side PROM WFL  -LH     Row Name 08/13/18 1100          General Assessment (Manual Muscle Testing)    Comment, General Manual Muscle Testing (MMT) Assessment 0/5 L side, R side 5/5  -LH     Row Name 08/13/18 1100          Motor Assessment/Intervention    Additional Documentation Balance (Group);Balance Interventions (Group)  -LH     Row Name 08/13/18 1100          Balance    Balance static sitting balance  -LH     Row Name 08/13/18 1100          Static Sitting Balance    Level of [place] (Unsupported Sitting, Static Balance) moderate assist, 50 to 74% patient effort;maximal assist, 25 to 49% patient effort  [facility]     Sitting Position (Unsupported Sitting, Static Balance) sitting on edge of bed  -LH     Row Name 08/13/18 1100          Balance Interventions    Systems Impairment Contributing to Balance Disturbance (Balance) neuromuscular;musculoskeletal  -     Identified Impairments Contributing to Balance Disturbance (Balance) coordination impaired;motor control impaired;muscle tone abnormal;postural control impaired;strength decreased  -LH     Row Name 08/13/18 1100          Pain Scale: Numbers Pre/Post-Treatment    Pain Scale: Numbers, Pretreatment 0/10 - no pain  -LH     Row Name 08/13/18 1100          Plan of Care Review    Plan of Care Reviewed With patient  -LH     Row Name 08/13/18 1100          Physical Therapy Clinical Impression    Date of Referral to PT 08/11/18  [facility]     PT Diagnosis (PT Clinical Impression) impaired mobility  -     Patient/Family Goals Statement (PT Clinical Impression) increase strength  -     Criteria for  Skilled Interventions Met (PT Clinical Impression) yes;treatment indicated  -     Rehab Potential (PT Clinical Summary) fair, will monitor progress closely  -     Predicted Duration of Therapy (PT) until d/c  -     Care Plan Review (PT) evaluation/treatment results reviewed;risks/benefits reviewed;patient/other agree to care plan  -     Row Name 08/13/18 1100          Physical Therapy Goals    Bed Mobility Goal Selection (PT) bed mobility, PT goal 1  -     Transfer Goal Selection (PT) transfer, PT goal 1  -     Strength Goal Selection (PT) strength, PT goal 1  -     Balance Goal Selection (PT) balance, PT goal 1  -     Additional Documentation Balance Goal Selection (PT) (Row);Strength Goal Selection (PT) (Row)  -     Row Name 08/13/18 1100          Bed Mobility Goal 1 (PT)    Activity/Assistive Device (Bed Mobility Goal 1, PT) bed mobility activities, all  -     [place] Level/Cues Needed (Bed Mobility Goal 1, PT) standby assist  -     Time Frame (Bed Mobility Goal 1, PT) by discharge  -     Progress/Outcomes (Bed Mobility Goal 1, PT) goal ongoing  -[facility] Name 08/13/18 1100          Transfer Goal 1 (PT)    Activity/Assistive Device (Transfer Goal 1, PT) sit-to-stand/stand-to-sit;bed-to-chair/chair-to-bed;walker, kaycee   sling LUE  -     [place] Level/Cues Needed (Transfer Goal 1, PT) minimum assist (75% or more patient effort)  -     Time Frame (Transfer Goal 1, PT) by discharge  -     Progress/Outcome (Transfer Goal 1, PT) goal ongoing  -     Row Name 08/13/18 1100          Strength Goal 1 (PT)    Strength Goal 1 (PT) Pt able to tolerate 20 minutes in standing frame  -     Time Frame (Strength Goal 1, PT) by discharge  -     Progress/Outcome (Strength Goal 1, PT) goal ongoing  -     Row Name 08/13/18 1100          Balance Goal 1 (PT)    Activity/Assistive Device (Balance Goal 1, PT) sitting, dynamic  -     [place] Level/Cues Needed (Balance Goal 1, PT)  standby assist  -     Time Frame (Balance Goal 1, PT) by discharge  -     Progress/Outcomes (Balance Goal 1, PT) goal ongoing  -     Row Name 08/13/18 1100          Positioning and Restraints    Pre-Treatment Position in bed  -     Post Treatment Position bed  -     In Bed side lying left;call light within reach;encouraged to call for assist;with family/caregiver;side rails up x2;SCD pump applied;pillow between legs  -     Row Name 08/13/18 1100          Living Environment    Home Accessibility stairs to enter home;tub/shower is not walk in  -       User Key  (r) = Recorded By, (t) = Taken By, (c) = Cosigned By    Initials Name Provider Type     Abdullahi Magaña, PT Physical Therapist          Physical Therapy Education     Title: PT OT SLP Therapies (Active)     Topic: Physical Therapy (Done)     Point: Mobility training (Done)    Learning Progress Summary     Learner Status Readiness Method Response Comment Documented by    Patient Done Acceptance E,D DU,VU benefits of PT and POC, call for assist w/ mobility  08/13/18 1149          Point: Precautions (Done)    Learning Progress Summary     Learner Status Readiness Method Response Comment Documented by    Patient Done Acceptance E,D DU,VU benefits of PT and POC, call for assist w/ mobility  08/13/18 1149                      User Key     Initials Effective Dates Name Provider Type [facility] 08/02/16 -  Abdullahi Magaña, PT Physical Therapist PT                PT Recommendation and Plan  Anticipated Discharge Disposition (PT): skilled nursing facility  Planned Therapy Interventions (PT Eval): balance training, bed mobility training, home exercise program, neuromuscular re-education, patient/family education, strengthening, transfer training  Therapy Frequency (PT Clinical Impression): daily (1-2x/day)  Outcome Summary/Treatment Plan (PT)  Anticipated Discharge Disposition (PT): skilled nursing facility  Plan of Care Reviewed With: patient,  caregiver  Outcome Summary: PT IE complete.  Pt mod/max for bed mobility and static sitting balance EOB.  0/5 LUE/LLE this morning.  Weight bearing through LUE in sitting w/ max assist to stabilize.  Pt fatigued this morning and unable to assess standing.  Pt to receive skilled PT to address functional mobility deficits and strength/balance impairments.  Recommend SNF at D/C.  Thank you for referral.          Outcome Measures     Row Name 08/13/18 1149 08/13/18 1100 08/12/18 1600       How much help from another person do you currently need...    Turning from your back to your side while in flat bed without using bedrails? 2  -LH  --  --    Moving from lying on back to sitting on the side of a flat bed without bedrails? 2  -LH  --  --    Moving to and from a bed to a chair (including a wheelchair)? 1  -LH  --  --    Standing up from a chair using your arms (e.g., wheelchair, bedside chair)? 1  -LH  --  --    Climbing 3-5 steps with a railing? 1  -LH  --  --    To walk in hospital room? 1  -LH  --  --    AM-PAC 6 Clicks Score 8  -LH  --  --       How much help from another is currently needed...    Putting on and taking off regular lower body clothing?  -- 2  -TS 1  -MM    Bathing (including washing, rinsing, and drying)  -- 2  -TS 1  -MM    Toileting (which includes using toilet bed pan or urinal)  -- 2  -TS 1  -MM    Putting on and taking off regular upper body clothing  -- 2  -TS 2  -MM    Taking care of personal grooming (such as brushing teeth)  -- 3  -TS 2  -MM    Eating meals  -- 3  -TS 2  -MM    Score  -- 14  -TS 9  -MM       Modified Luce Scale    Modified Luce Scale  --  -- 4 - Moderately severe disability.  Unable to walk without assistance, and unable to attend to own bodily needs without assistance.  -MM       Functional Assessment    Outcome Measure Options AM-PAC 6 Clicks Basic Mobility (PT)  -LH AM-PAC 6 Clicks Daily Activity (OT)  -TS AM-PAC 6 Clicks Daily Activity (OT);Modified Luce  -MM       User Key  (r) = Recorded By, (t) = Taken By, (c) = Cosigned By    Initials Name Provider Type     Abdullahi Magaña, PT Physical Therapist    TS Marilin Zimmer, FIGUEREDO/L Occupational Therapy Assistant    MM Obie Negrete, OTR/L Occupational Therapist            Acute Care - Occupational Therapy Treatment Note   [place]     Patient Name: Angelika Sapp  : 1940  MRN: [id number]  Today's Date: 2018  Onset of Illness/Injury or Date of Surgery: (P) 18  Date of Referral to OT: 18  Referring Physician: (P) Mick Francisco    Admit Date: 2018       ICD-10-CM ICD-9-CM   1. Cerebrovascular accident (CVA), unspecified mechanism (CMS/HCC) I63.9 434.91   2. Oropharyngeal dysphagia R13.12 787.22   3. Impaired mobility and ADLs Z74.09 799.89     Patient Active Problem List   Diagnosis   • Cerebrovascular accident (CVA) (CMS/HCC)     Past Medical History:   Diagnosis Date   • Arthritis    • CVA (cerebral vascular accident) (CMS/HCC)    • Depressive disorder    • Diabetes mellitus (CMS/HCC)    • GERD (gastroesophageal reflux disease)    • Hemiplegia (CMS/HCC)    • Hyperlipidemia    • Hypertension    • Parkinson's disease (CMS/HCC)    • Renal disorder      History reviewed. No pertinent surgical history.    Therapy Treatment          Rehabilitation Treatment Summary     Row Name 18 1001 18 0836          Treatment Time/Intention    Discipline occupational therapy assistant  -TS speech language pathologist  -MB     Document Type therapy note (daily note)  -TS therapy note (daily note)  -MB     Subjective Information complains of;weakness;fatigue  -TS2 no complaints  -MB     Mode of Treatment  -- speech-language pathology  -MB     Patient Effort good  -TS2 good  -MB     Existing Precautions/Restrictions fall   LUE flaccid, LLE weak  -TS2  --     Treatment Considerations/Comments 1 inch subluxation L shoulder   -TS2  --     Recorded by [TS] Marilin Zimmer COTA/L 18 1001  [TS2]  Marilin Zimmer COTA/L 08/13/18 1126 [MB] Dariela Benitez, CCC-SLP 08/13/18 0848     Row Name 08/13/18 1001             Cognitive Assessment/Intervention- PT/OT    Personal Safety Interventions fall prevention program maintained;gait belt;nonskid shoes/slippers when out of bed;elopement precautions initiated  -TS      Recorded by [TS] Marilin Zimmer COTA/L 08/13/18 1126      Row Name 08/13/18 1001             Bed Mobility Assessment/Treatment    Bed Mobility Assessment/Treatment supine-sit;sit-supine;rolling right;rolling left;scooting/bridging  -TS      Rolling Left [place] (Bed Mobility) moderate assist (50% patient effort)  -TS      Rolling Right [place] (Bed Mobility) moderate assist (50% patient effort)  -TS      Scooting/Bridging [place] (Bed Mobility) maximum assist (25% patient effort)  -TS      Supine-Sit [place] (Bed Mobility) moderate assist (50% patient effort);maximum assist (25% patient effort)  -TS      Sit-Supine [place] (Bed Mobility) moderate assist (50% patient effort)  -TS      Bed Mobility, Safety Issues decreased use of arms for pushing/pulling;decreased use of legs for bridging/pushing  -TS      Assistive Device (Bed Mobility) bed rails;draw sheet;head of bed elevated  -TS      Recorded by [TS] Marilin Zimmer COTA/L 08/13/18 1126      Row Name 08/13/18 1001             ADL Assessment/Intervention    16485 - OT Self Care/Mgmt Minutes 54  -TS      BADL Assessment/Intervention lower body dressing  -TS      Recorded by [TS] Marilin Zimmer COTA/L 08/13/18 1126      Row Name 08/13/18 1001             Lower Body Dressing Assessment/Training    Lower Body Dressing Beattie Level don;doff;undergarment;maximum assist (25% patient effort)  -TS      Lower Body Dressing Position supine  -TS      Recorded by [TS] Marilin Zimmer COTA/L 08/13/18 1126      Row Name 08/13/18 1001             General ROM    GENERAL ROM COMMENTS pt was able to  initiate grasp with digits of L hand, PROM completed in all other fields  -TS      Recorded by [TS] Marilin Zimmer COTA/L 08/13/18 1144      Row Name 08/13/18 1001             Motor Skills Assessment/Interventions    Additional Documentation Muscle Tone Assessment (Row)  -TS      Recorded by [TS] Marilin Zimmer COTA/L 08/13/18 1144      Row Name 08/13/18 1001             Balance    Balance static sitting balance  -TS      Recorded by [TS] Marilin Zimmer COTA/L 08/13/18 1144      Row Name 08/13/18 1001             Static Sitting Balance    Level of [place] (Unsupported Sitting, Static Balance) contact guard assist;minimal assist, 75% patient effort;moderate assist, 50 to 74% patient effort  -TS      Sitting Position (Unsupported Sitting, Static Balance) sitting on edge of bed  -TS      Time Able to Maintain Position (Unsupported Sitting, Static Balance) 2 to 3 minutes  -TS      Comment (Unsupported Sitting, Static Balance) verbal cues to correct anterior lean to L side   -TS      Recorded by [TS] Marilin Zimmer FIGUEREDO/L 08/13/18 1144      Row Name 08/13/18 1001             Positioning and Restraints    Pre-Treatment Position in bed  -TS      Post Treatment Position bed  -TS      In Bed side lying left;call light within reach;encouraged to call for assist;exit alarm on;side rails up x2;with family/caregiver;SCD pump applied  -TS      Recorded by [TS] Marilin Zimmer COTA/L 08/13/18 1126      Row Name 08/13/18 0836             Pain Scale: FACES Pre/Post-Treatment    Pain: FACES Scale, Pretreatment 0-->no hurt  -MB      Recorded by [MB] Dariela Benitez CCC-SLP 08/13/18 0848      Row Name 08/13/18 1001             Outcome Summary/Treatment Plan (OT)    Daily Summary of Progress (OT) progress toward functional goals is good  -TS      Anticipated Discharge Disposition (OT) skilled nursing facility  -TS      Recorded by [TS] Marilin Zimmer COTA/L 08/13/18 1126      Row Name  08/13/18 0836             Outcome Summary/Treatment Plan (SLP)    Daily Summary of Progress (SLP) progress toward functional goals as expected  -MB      Recorded by [MB] Dariela Benitez CCC-SLP 08/13/18 0848        User Key  (r) = Recorded By, (t) = Taken By, (c) = Cosigned By    Initials Name Effective Dates Discipline    MB Dariela Benitez, CCC-SLP 08/02/16 -  SLP    TS Marilin Zimmer COTA/L 08/02/16 -  OT               Occupational Therapy Education     Title: PT OT SLP Therapies (Active)     Topic: Occupational Therapy (Active)     Point: ADL training (Done)     Description: Instruct learner(s) on proper safety adaptation and remediation techniques during self care or transfers.   Instruct in proper use of assistive devices.   Learning Progress Summary     Learner Status Readiness Method Response Comment Documented by    Patient Done Acceptance E,D VU,NR SROM, PROM to LUE, positioning of LUE, progression of POC , sit balance  08/13/18 1126     Active Acceptance E NR OT role, benefits, POC, supporting LUE  08/12/18 1609    Family Done Acceptance E,D VU,NR SROM, PROM to LUE, positioning of LUE, progression of POC , sit balance  08/13/18 1126          Point: Precautions (Done)     Description: Instruct learner(s) on prescribed precautions during self-care and functional transfers.   Learning Progress Summary     Learner Status Readiness Method Response Comment Documented by    Patient Done Acceptance E,D VU,NR SROM, PROM to LUE, positioning of LUE, progression of POC , sit balance  08/13/18 1126    Family Done Acceptance E,D VU,NR SROM, PROM to LUE, positioning of LUE, progression of POC , sit balance  08/13/18 1126                      User Key     Initials Effective Dates Name Provider Type Discipline     08/02/16 -  Marilin Zimmer FIGUEREDO/L Occupational Therapy Assistant OT     04/03/18 -  Obie Negrete OTR/L Occupational Therapist OT                OT Recommendation and  Plan  Outcome Summary/Treatment Plan (OT)  Daily Summary of Progress (OT): progress toward functional goals is good  Anticipated Discharge Disposition (OT): skilled nursing facility  Daily Summary of Progress (OT): progress toward functional goals is good  Plan of Care Review  Plan of Care Reviewed With: patient  Plan of Care Reviewed With: patient  Outcome Summary: Pt min/mod A for all bed mobility. Pt and daughter educated on SROM/PROM for LUE along with positioning and protection of LUE.. 1 finger width of subluxation both noted at L shoulder while seated EOB. Pt CGA/mod A with verbal cues for sit balance EOB. Pt would benefit from SNF for continued rehab prior to returning home. Continue OT POC         Outcome Measures     Row Name 08/13/18 1100 08/12/18 1600          How much help from another is currently needed...    Putting on and taking off regular lower body clothing? 2  -TS 1  -MM     Bathing (including washing, rinsing, and drying) 2  -TS 1  -MM     Toileting (which includes using toilet bed pan or urinal) 2  -TS 1  -MM     Putting on and taking off regular upper body clothing 2  -TS 2  -MM     Taking care of personal grooming (such as brushing teeth) 3  -TS 2  -MM     Eating meals 3  -TS 2  -MM     Score 14  -TS 9  -MM        Modified Conway Scale    Modified Conway Scale  -- 4 - Moderately severe disability.  Unable to walk without assistance, and unable to attend to own bodily needs without assistance.  -MM        Functional Assessment    Outcome Measure Options AM-PAC 6 Clicks Daily Activity (OT)  -TS AM-PAC 6 Clicks Daily Activity (OT);Modified [place]  -MM       User Key  (r) = Recorded By, (t) = Taken By, (c) = Cosigned By    Initials Name Provider Type    TS Marilin Zimmer, FIGUEREDO/L Occupational Therapy Assistant    MM Obie Negrete, OTR/L Occupational Therapist           Time Calculation:         Time Calculation- OT     Row Name 08/13/18 1148 08/13/18 1001          Time Calculation- OT     OT Start Time 1001  -TS  --     OT Stop Time 1055  -TS  --     OT Time Calculation (min) 54 min  -TS  --     Total Timed Code Minutes- OT 54 minute(s)  -TS  --     OT Received On 08/13/18  -TS  --        Timed Charges    48289 - OT Self Care/Mgmt Minutes  -- 54  -TS       User Key  (r) = Recorded By, (t) = Taken By, (c) = Cosigned By    Initials Name Provider Type    TS Marilin Zimmer COTA/L Occupational Therapy Assistant           Therapy Suggested Charges     Code   Minutes Charges    66056 (CPT®) Hc Ot Neuromusc Re Education Ea 15 Min      01130 (CPT®) Hc Ot Ther Proc Ea 15 Min      06004 (CPT®) Hc Ot Therapeutic Act Ea 15 Min      68059 (CPT®) Hc Ot Manual Therapy Ea 15 Min      94118 (CPT®) Hc Ot Iontophoresis Ea 15 Min      13167 (CPT®) Hc Ot Elec Stim Ea-Per 15 Min      63474 (CPT®) Hc Ot Ultrasound Ea 15 Min      77839 (CPT®) Hc Ot Self Care/Mgmt/Train Ea 15 Min 54 4    Total  54 4        Therapy Charges for Today     Code Description Service Date Service Provider Modifiers Qty    00230403339 HC OT SELF CARE/MGMT/TRAIN EA 15 MIN 8/13/2018 Marilin Zimmer COTA/L GO, KDILSHAD 4          OT G-codes  OT Professional Judgement Used?: Yes  OT Functional Scales Options: AM-PAC 6 Clicks Daily Activity (OT)  Score: 9/4  Functional Limitation: Self care  Self Care Current Status (): At least 60 percent but less than 80 percent impaired, limited or restricted  Self Care Goal Status (): At least 40 percent but less than 60 percent impaired, limited or restricted    SP Johnson  8/13/2018    Electronically signed by Marilin Zimmer COTA/L at 8/13/2018 11:49 AM

## 2018-08-13 NOTE — PROGRESS NOTES
Neurology Progress Note      Date of admission: 8/11/2018  9:56 AM  Date of visit: 8/13/2018    Chief Complaint:    Chief Complaint   Patient presents with   • Stroke       Subjective     Subjective:  78 years old male with history of  stroke about 2 years ago when he noted left sided  Weakness. Symptoms  improved and but   he developed left-sided weakness.  He continues to have left-sided weakness.  MRI brain showed right basal ganglia  Infarct.  Patient remains stable.  Medications:  Current Facility-Administered Medications   Medication Dose Route Frequency Provider Last Rate Last Dose   • aspirin chewable tablet 81 mg  81 mg Oral Daily Mick Francisco APRN   81 mg at 08/13/18 0929   • atorvastatin (LIPITOR) tablet 80 mg  80 mg Oral Nightly Mick Francisco APRN   80 mg at 08/12/18 2121   • carbidopa-levodopa (SINEMET)  MG per tablet 2 tablet  2 tablet Oral TID Mick Francisco APRN   2 tablet at 08/13/18 0928   • clopidogrel (PLAVIX) tablet 75 mg  75 mg Oral Daily Mick Francisco APRN   75 mg at 08/13/18 0929   • [START ON 8/14/2018] cyanocobalamin injection 1,000 mcg  1,000 mcg Intramuscular Daily Harris Patel,        • dextrose (D50W) solution 25 g  25 g Intravenous Q15 Min PRN Mick Francisco APRN       • dextrose (GLUTOSE) oral gel 15 g  15 g Oral Q15 Min PRN Mick Francisco APRN       • enalaprilat (VASOTEC) injection 0.625 mg  0.625 mg Intravenous Q6H PRN Mick Francisco APRN       • escitalopram (LEXAPRO) tablet 20 mg  20 mg Oral Daily Mick Francisco APRN   20 mg at 08/13/18 0929   • fenofibrate (TRICOR) tablet 145 mg  145 mg Oral Daily Mick Francisco APRN   145 mg at 08/13/18 0928   • glucagon (human recombinant) (GLUCAGEN DIAGNOSTIC) injection 1 mg  1 mg Subcutaneous PRN Mick Francisco APRN       • insulin detemir (LEVEMIR) injection 25 Units  25 Units Subcutaneous Nightly Mick Francisco APRN       • insulin lispro (humaLOG) injection 0-14 Units  0-14  Units Subcutaneous 4x Daily With Meals & Nightly Mick Francisco APRN   10 Units at 08/13/18 1217   • losartan (COZAAR) tablet 100 mg  100 mg Oral Q24H Mikc Francisco APRN   100 mg at 08/13/18 0929   • pantoprazole (PROTONIX) EC tablet 40 mg  40 mg Oral Daily Mick Francisco APRN   40 mg at 08/13/18 0928   • sodium chloride 0.9 % flush 1-10 mL  1-10 mL Intravenous PRN Mick Francisco APRN       • traZODone (DESYREL) tablet 150 mg  150 mg Oral Nightly Mick Francisco APRN   150 mg at 08/12/18 2121       Review of Systems:   -A 14 point review of systems was reviewed and was negative except for as it is noted in the history of present illness.    Objective     Objective      Vital Signs  Temp:  [97.8 °F (36.6 °C)-98.7 °F (37.1 °C)] 98.2 °F (36.8 °C)  Heart Rate:  [53-78] 61  Resp:  [18-20] 20  BP: (137-178)/(59-85) 149/59    Physical Exam:   Heart regular, chest clear on auscultation, and abdomen soft.       Neurologic Exam:   Patient is awake, speech slow.  Face symmetrical.  Motor  exam left hemiplegia.  Sensory exam unremarkable     Results Review:    I reviewed the patient's new clinical results.    Lab Results (last 24 hours)     Procedure Component Value Units Date/Time    POC Glucose Once [[id number]]  (Abnormal) Collected:  08/13/18 1131    Specimen:  Blood Updated:  08/13/18 1142     Glucose 305 (H) mg/dL      Comment: : [id number] Sherman SarahMeter ID: [id number]       POC Glucose Once [[id number]]  (Abnormal) Collected:  08/13/18 0813    Specimen:  Blood Updated:  08/13/18 0824     Glucose 170 (H) mg/dL      Comment: : [id number] Sherman SarahMeter ID: [id number]       Basic Metabolic Panel [[id number]]  (Abnormal) Collected:  08/13/18 0643    Specimen:  Blood Updated:  08/13/18 0709     Glucose 182 (H) mg/dL      BUN 23 (H) mg/dL      Creatinine 1.15 mg/dL      Sodium 138 mmol/L      Potassium 4.3 mmol/L      Chloride 106 mmol/L      CO2 25.0 mmol/L      Calcium 8.4 mg/dL      eGFR Non   Amer 61 mL/min/1.73      BUN/Creatinine Ratio 20.0     Anion Gap 7.0 mmol/L     Narrative:       The MDRD GFR formula is only valid for adults with stable renal function between ages 18 and 70.    CBC & Differential [[id number]] Collected:  08/13/18 0643    Specimen:  Blood Updated:  08/13/18 0650    Narrative:       The following orders were created for panel order CBC & Differential.  Procedure                               Abnormality         Status                     ---------                               -----------         ------                     CBC Auto Differential[[id number]]        Abnormal            Final result                 Please view results for these tests on the individual orders.    CBC Auto Differential [[id number]]  (Abnormal) Collected:  08/13/18 0643    Specimen:  Blood Updated:  08/13/18 0650     WBC 6.16 10*3/mm3      RBC 3.74 (L) 10*6/mm3      Hemoglobin 12.3 (L) g/dL      Hematocrit 35.7 (L) %      MCV 95.5 (H) fL      MCH 32.9 (H) pg      MCHC 34.5 g/dL      RDW 12.8 %      RDW-SD 45.1 fl      MPV 9.9 fL      Platelets 199 10*3/mm3      Neutrophil % 56.2 %      Lymphocyte % 25.8 %      Monocyte % 12.3 (H) %      Eosinophil % 4.4 (H) %      Basophil % 0.5 %      Immature Grans % 0.8 %      Neutrophils, Absolute 3.46 10*3/mm3      Lymphocytes, Absolute 1.59 10*3/mm3      Monocytes, Absolute 0.76 10*3/mm3      Eosinophils, Absolute 0.27 10*3/mm3      Basophils, Absolute 0.03 10*3/mm3      Immature Grans, Absolute 0.05 (H) 10*3/mm3      nRBC 0.0 /100 WBC     POC Glucose Once [[id number]]  (Abnormal) Collected:  08/12/18 1955    Specimen:  Blood Updated:  08/12/18 2024     Glucose 189 (H) mg/dL      Comment: : [id number] Joan CalderonahMeter ID: [id number]       POC Glucose Once [[id number]]  (Abnormal) Collected:  08/12/18 1651    Specimen:  Blood Updated:  08/12/18 1705     Glucose 215 (H) mg/dL      Comment: : [id number] Abigail AllieMeter ID: [id number]           Imaging  Results (last 24 hours)     Procedure Component Value Units Date/Time    US Carotid Bilateral [[id number]] Collected:  08/13/18 1137     Updated:  08/13/18 1141    Narrative:       History: Stroke       Impression:       Impression:  1. There is less than 50% stenosis of the right internal carotid artery.  2. There is less than 50% stenosis of the left internal carotid artery.  3. Antegrade flow is demonstrated in bilateral vertebral arteries.     Comments: Bilateral carotid vertebral arterial duplex scan was  performed.     Grayscale imaging shows intimal thickening and calcified elements at the  carotid bifurcation. The right internal carotid artery peak systolic  velocity is 51.9 cm/sec. The end-diastolic velocity is 13 cm/sec. The  right ICA/CCA ratio is approximately 0.65 . These findings correlate  with less than 50% stenosis of the right internal carotid artery.     Grayscale imaging shows intimal thickening and calcified elements at the  carotid bifurcation. The left internal carotid artery peak systolic  velocity is 79 cm/sec. The end-diastolic velocity is 22.4 cm/sec. The  left ICA/CCA ratio is approximately 0.72 . These findings correlate with  less than 50% stenosis of the left internal carotid artery.     Antegrade flow is demonstrated in bilateral vertebral arteries.  There is greater than 50% stenosis of the left external carotid artery.  This report was finalized on 08/13/2018 11:38 by Dr. Yo Cohen MD.    CT Head Without Contrast [[id number]] Collected:  08/12/18 1703     Updated:  08/12/18 1708    Narrative:       CT BRAIN without contrast 8/12/2018 4:34 PM CDT     HISTORY: Worsening neuro deficit     COMPARISON: August 11, 2018      DLP: 710 mGy cm     TECHNIQUE: Serial axial tomographic images of the brain were obtained  without the use of intravenous contrast.      FINDINGS:   The midline structures are nondisplaced. There is mild to moderate  cerebral and cerebellar volume loss, with an  associated increase in the  prominence of the ventricles and sulci. The basilar cisterns are normal  in size and configuration. There is no evidence of intracranial  hemorrhage or mass-effect. There is low attenuation in the  periventricular white matter, consistent with chronic ischemic change.  Right basal ganglia lacunar infarctions noted. There are no abnormal  extra-axial fluid collections. There is no evidence of tonsillar  herniation.      The included orbits and their contents are unremarkable. The visualized  paranasal sinuses, mastoid air cells and middle ear cavities are clear.  The visualized osseous structures and overlying soft tissues of the  skull and face are intact.        Impression:       Changes of aging stable since August 11, 2018 no acute intracranial  abnormality..        This report was finalized on 08/12/2018 17:05 by Dr. Cesar Rodriguez MD.          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  Patient remains stable    Plan:   Recommend- continue  Aspirin 81  milligram a day, Plavix and Lipitor 80 mg a day.  Neurology will sign off. Contact if needed.      Jose Guillory MD  08/13/18  4:25 PM

## 2018-08-13 NOTE — PLAN OF CARE
Problem: Fall Risk (Adult)  Goal: Absence of Fall  Outcome: Ongoing (interventions implemented as appropriate)      Problem: Skin Injury Risk (Adult)  Goal: Skin Health and Integrity  Outcome: Ongoing (interventions implemented as appropriate)      Problem: Patient Care Overview  Goal: Plan of Care Review  Outcome: Ongoing (interventions implemented as appropriate)   08/13/18 0254   Coping/Psychosocial   Plan of Care Reviewed With patient   Plan of Care Review   Progress no change   OTHER   Outcome Summary PT A&O, no neuro changes. L side numb and flaccid. Turn Q2, no skin breakdown. Free from falls.        Problem: Stroke (Ischemic) (Adult)  Goal: Signs and Symptoms of Listed Potential Problems Will be Absent, Minimized or Managed (Stroke)  Outcome: Ongoing (interventions implemented as appropriate)

## 2018-08-13 NOTE — PROGRESS NOTES
Continued Stay Note  [facility]     Patient Name: Angelika Sapp  MRN: [id number]  Today's Date: 8/13/2018    Admit Date: 8/11/2018          Discharge Plan     Row Name 08/13/18 1329       Plan    Plan Referral to [facility]    Patient/Family in Agreement with Plan yes    Plan Comments Patient's family would like referral made to [facility] in [place]. MARIANA faxed referral to [facility] at [phone] and notified [facility] in admissions of new referral. Will follow for [facility] to evaluate.            SWATI NewW

## 2018-08-13 NOTE — THERAPY EVALUATION
Acute Care - Physical Therapy Initial Evaluation  [facility]     Patient Name: Angelika Sapp  : 1940  MRN: [id number]  Today's Date: 2018   Onset of Illness/Injury or Date of Surgery: 18  Date of Referral to PT: 18  Referring Physician: Mick Francisco      Admit Date: 2018    Visit Dx:     ICD-10-CM ICD-9-CM   1. Cerebrovascular accident (CVA), unspecified mechanism (CMS/HCC) I63.9 434.91   2. Oropharyngeal dysphagia R13.12 787.22   3. Impaired mobility and ADLs Z74.09 799.89   4. Impaired mobility Z74.09 799.89     Patient Active Problem List   Diagnosis   • Cerebrovascular accident (CVA) (CMS/HCC)     Past Medical History:   Diagnosis Date   • Arthritis    • CVA (cerebral vascular accident) (CMS/HCC)    • Depressive disorder    • Diabetes mellitus (CMS/HCC)    • GERD (gastroesophageal reflux disease)    • Hemiplegia (CMS/HCC)    • Hyperlipidemia    • Hypertension    • Parkinson's disease (CMS/[facility])    • Renal disorder      History reviewed. No pertinent surgical history.     PT ASSESSMENT (last 12 hours)      Physical Therapy Evaluation     Row Name 18 1100          PT Evaluation Time/Intention    Subjective Information complains of;weakness;fatigue  -     Document Type evaluation  -     Mode of Treatment physical therapy  -     Row Name 18 1100          General Information    Patient Profile Reviewed? yes  -     Onset of Illness/Injury or Date of Surgery 18  -     Referring Physician Mick Francisco  -     Patient Observations alert;cooperative;agree to therapy  -     General Observations of Patient sidelying in bed  -     Prior Level of Function independent:;all household mobility;min assist:;mod assist:;ADL's  -     Equipment Currently Used at Home wheelchair;walker, rolling;cane, straight;shower chair  -     Pertinent History of Current Functional Problem R basal ganglia infarct w/ extension to R paraventricular region   -     Existing  Precautions/Restrictions fall   L shoulder subluxed  -     Equipment Issued to Patient walker, kaycee  -     Risks Reviewed patient:;LOB;nausea/vomiting;dizziness;increased discomfort  -     Benefits Reviewed patient:;improve function;increase independence;increase strength;increase balance  -     Barriers to Rehab medically complex;previous functional deficit  -[facility] Name 08/13/18 1100          Relationship/Environment    Primary Source of Support/Comfort spouse  -     Lives With spouse  -[facility] Name 08/13/18 1100          Resource/Environmental Concerns    Current Living Arrangements home/apartment/condo  -[facility] Name 08/13/18 1100          Home Main Entrance    Number of Stairs, Main Entrance two  -     Stair Railings, Main Entrance none  -[facility] Name 08/13/18 1100          Cognitive Assessment/Interventions    Additional Documentation Cognitive Assessment/Intervention (Group)  -[facility] Name 08/13/18 1100          Cognitive Assessment/Intervention- PT/OT    Affect/Mental Status (Cognitive) WNL  -     Orientation Status (Cognition) oriented x 4  -[facility] Name 08/13/18 1100          Safety Issues, Functional Mobility    Impairments Affecting Function (Mobility) balance;coordination;endurance/activity tolerance;motor control;motor planning;muscle tone abnormal;postural/trunk control;strength  -[facility] Name 08/13/18 1100          Bed Mobility Assessment/Treatment    Bed Mobility Assessment/Treatment sidelying-sit;sit-sidelying  -     Sidelying-Sit [place] (Bed Mobility) verbal cues;moderate assist (50% patient effort)  -     Sit-Sidelying [place] (Bed Mobility) verbal cues;moderate assist (50% patient effort);maximum assist (25% patient effort)  -     Bed Mobility, Safety Issues decreased use of arms for pushing/pulling;decreased use of legs for bridging/pushing;impaired trunk control for bed mobility  -     Assistive Device (Bed Mobility) draw sheet;head of bed  elevated;bed rails  -LH     Row Name 08/13/18 1100          Transfer Assessment/Treatment    Comment (Transfers) unable to assess  -LH     Row Name 08/13/18 1100          General ROM    GENERAL ROM COMMENTS WFL, L side PROM WFL  -LH     Row Name 08/13/18 1100          General Assessment (Manual Muscle Testing)    Comment, General Manual Muscle Testing (MMT) Assessment 0/5 L side, R side 5/5  -LH     Row Name 08/13/18 1100          Motor Assessment/Intervention    Additional Documentation Balance (Group);Balance Interventions (Group)  -LH     Row Name 08/13/18 1100          Balance    Balance static sitting balance  -LH     Row Name 08/13/18 1100          Static Sitting Balance    Level of [place] (Unsupported Sitting, Static Balance) moderate assist, 50 to 74% patient effort;maximal assist, 25 to 49% patient effort  -     Sitting Position (Unsupported Sitting, Static Balance) sitting on edge of bed  -LH     Row Name 08/13/18 1100          Balance Interventions    Systems Impairment Contributing to Balance Disturbance (Balance) neuromuscular;musculoskeletal  -     Identified Impairments Contributing to Balance Disturbance (Balance) coordination impaired;motor control impaired;muscle tone abnormal;postural control impaired;strength decreased  -LH     Row Name 08/13/18 1100          Pain Scale: Numbers Pre/Post-Treatment    Pain Scale: Numbers, Pretreatment 0/10 - no pain  -LH     Row Name 08/13/18 1100          Plan of Care Review    Plan of Care Reviewed With patient  -LH     Row Name 08/13/18 1100          Physical Therapy Clinical Impression    Date of Referral to PT 08/11/18  [facility]     PT Diagnosis (PT Clinical Impression) impaired mobility  -     Patient/Family Goals Statement (PT Clinical Impression) increase strength  -     Criteria for Skilled Interventions Met (PT Clinical Impression) yes;treatment indicated  -     Rehab Potential (PT Clinical Summary) fair, will monitor progress closely  -      Predicted Duration of Therapy (PT) until d/c  -     Care Plan Review (PT) evaluation/treatment results reviewed;risks/benefits reviewed;patient/other agree to care plan  -     Row Name 08/13/18 1100          Physical Therapy Goals    Bed Mobility Goal Selection (PT) bed mobility, PT goal 1  -     Transfer Goal Selection (PT) transfer, PT goal 1  -     Strength Goal Selection (PT) strength, PT goal 1  -     Balance Goal Selection (PT) balance, PT goal 1  -     Additional Documentation Balance Goal Selection (PT) (Row);Strength Goal Selection (PT) (Row)  -     Row Name 08/13/18 1100          Bed Mobility Goal 1 (PT)    Activity/Assistive Device (Bed Mobility Goal 1, PT) bed mobility activities, all  -     [place] Level/Cues Needed (Bed Mobility Goal 1, PT) standby assist  -     Time Frame (Bed Mobility Goal 1, PT) by discharge  -     Progress/Outcomes (Bed Mobility Goal 1, PT) goal ongoing  -[facility] Name 08/13/18 1100          Transfer Goal 1 (PT)    Activity/Assistive Device (Transfer Goal 1, PT) sit-to-stand/stand-to-sit;bed-to-chair/chair-to-bed;walker, kaycee   sling LUE  -     [place] Level/Cues Needed (Transfer Goal 1, PT) minimum assist (75% or more patient effort)  -     Time Frame (Transfer Goal 1, PT) by discharge  -     Progress/Outcome (Transfer Goal 1, PT) goal ongoing  -[facility] Name 08/13/18 1100          Strength Goal 1 (PT)    Strength Goal 1 (PT) Pt able to tolerate 20 minutes in standing frame  -     Time Frame (Strength Goal 1, PT) by discharge  -     Progress/Outcome (Strength Goal 1, PT) goal ongoing  -[facility] Name 08/13/18 1100          Balance Goal 1 (PT)    Activity/Assistive Device (Balance Goal 1, PT) sitting, dynamic  -     [place] Level/Cues Needed (Balance Goal 1, PT) standby assist  -     Time Frame (Balance Goal 1, PT) by discharge  -     Progress/Outcomes (Balance Goal 1, PT) goal ongoing  [facility]     Row Name 08/13/18 1100           Positioning and Restraints    Pre-Treatment Position in bed  [facility]     Post Treatment Position bed  -     In Bed side lying left;call light within reach;encouraged to call for assist;with family/caregiver;side rails up x2;SCD pump applied;pillow between legs  -     Row Name 08/13/18 1100          Living Environment    Home Accessibility stairs to enter home;tub/shower is not walk in  -       User Key  (r) = Recorded By, (t) = Taken By, (c) = Cosigned By    Initials Name Provider Type     Abdullahi Magaña, PT Physical Therapist          Physical Therapy Education     Title: PT OT SLP Therapies (Active)     Topic: Physical Therapy (Done)     Point: Mobility training (Done)    Learning Progress Summary     Learner Status Readiness Method Response Comment Documented by    Patient Done Acceptance ERIK GALLOWAY DU,VU benefits of PT and POC, call for assist w/ mobility  08/13/18 1149          Point: Precautions (Done)    Learning Progress Summary     Learner Status Readiness Method Response Comment Documented by    Patient Done Acceptance ERIK GALLOWAY DU,VU benefits of PT and POC, call for assist w/ mobility  08/13/18 1149                      User Key     Initials Effective Dates Name Provider Type [facility] 08/02/16 -  Abdullahi Magaña, PT Physical Therapist PT                PT Recommendation and Plan  Anticipated Discharge Disposition (PT): skilled nursing facility  Planned Therapy Interventions (PT Eval): balance training, bed mobility training, home exercise program, neuromuscular re-education, patient/family education, strengthening, transfer training  Therapy Frequency (PT Clinical Impression): daily (1-2x/day)  Outcome Summary/Treatment Plan (PT)  Anticipated Discharge Disposition (PT): skilled nursing facility  Plan of Care Reviewed With: patient, caregiver  Outcome Summary: PT IE complete.  Pt mod/max for bed mobility and static sitting balance EOB.  0/5 LUE/LLE this morning.  Weight bearing through LUE in sitting w/  max assist to stabilize.  Pt fatigued this morning and unable to assess standing.  Pt to receive skilled PT to address functional mobility deficits and strength/balance impairments.  Recommend SNF at D/C.  Thank you for referral.          Outcome Measures     Row Name 08/13/18 1149 08/13/18 1100 08/12/18 1600       How much help from another person do you currently need...    Turning from your back to your side while in flat bed without using bedrails? 2  -LH  --  --    Moving from lying on back to sitting on the side of a flat bed without bedrails? 2  -LH  --  --    Moving to and from a bed to a chair (including a wheelchair)? 1  -LH  --  --    Standing up from a chair using your arms (e.g., wheelchair, bedside chair)? 1  -LH  --  --    Climbing 3-5 steps with a railing? 1  -LH  --  --    To walk in hospital room? 1  -LH  --  --    AM-PAC 6 Clicks Score 8  -LH  --  --       How much help from another is currently needed...    Putting on and taking off regular lower body clothing?  -- 2  -TS 1  -MM    Bathing (including washing, rinsing, and drying)  -- 2  -TS 1  -MM    Toileting (which includes using toilet bed pan or urinal)  -- 2  -TS 1  -MM    Putting on and taking off regular upper body clothing  -- 2  -TS 2  -MM    Taking care of personal grooming (such as brushing teeth)  -- 3  -TS 2  -MM    Eating meals  -- 3  -TS 2  -MM    Score  -- 14  -TS 9  -MM       Modified [place] Scale    Modified [place] Scale  --  -- 4 - Moderately severe disability.  Unable to walk without assistance, and unable to attend to own bodily needs without assistance.  -MM       Functional Assessment    Outcome Measure Options AM-PAC 6 Clicks Basic Mobility (PT)  - AM-PAC 6 Clicks Daily Activity (OT)  -TS AM-PAC 6 Clicks Daily Activity (OT);Modified [place]  -MM      User Key  (r) = Recorded By, (t) = Taken By, (c) = Cosigned By    Initials Name Provider Type     Abdullahi Magaña, PT Physical Therapist    TS Marilin Zimmer, BEA/ENRIQUE  Occupational Therapy Assistant    Obie Caceres, OTR/L Occupational Therapist           Time Calculation:         PT Charges     Row Name 08/13/18 1153             Time Calculation    Start Time 1100  -      Stop Time 1145  -      Time Calculation (min) 45 min  -      PT Received On 08/13/18  -      PT Goal Re-Cert Due Date 08/23/18  -        User Key  (r) = Recorded By, (t) = Taken By, (c) = Cosigned By    Initials Name Provider Type     Abdullahi Magaña, PT Physical Therapist        Therapy Suggested Charges     Code   Minutes Charges    None           Therapy Charges for Today     Code Description Service Date Service Provider Modifiers Qty    16040232697 HC PT MOBILITY CURRENT 8/13/2018 Abdullahi Magaña, PT GP, CM 1    14966758203 HC PT MOBILITY PROJECTED 8/13/2018 Abdullahi Magaña, PT GP, CL 1    53313674900 HC PT EVAL HIGH COMPLEXITY 3 8/13/2018 Abdullahi Magaña, PT GP, KX 1          PT G-Codes  Outcome Measure Options: AM-PAC 6 Clicks Basic Mobility (PT)  Score: 8  Functional Limitation: Mobility: Walking and moving around  Mobility: Walking and Moving Around Current Status (): At least 80 percent but less than 100 percent impaired, limited or restricted  Mobility: Walking and Moving Around Goal Status (): At least 60 percent but less than 80 percent impaired, limited or restricted      Abdullahi Magaña PT  8/13/2018

## 2018-08-13 NOTE — THERAPY TREATMENT NOTE
Acute Care - Speech Language Pathology   Swallow Treatment Note [facility]     Patient Name: Angelika Sapp  : 1940  MRN: [id number]  Today's Date: 2018  Onset of Illness/Injury or Date of Surgery: 18     Referring Physician: BRENNA Kaur      Admit Date: 2018  Pt had breakfast tray in front of him upon SLP arrival. He was holding his fork, but hadn't began eating as he was reclined back in a poor position to feed himself. SLP assisted with sitting pt's bed upright and positioning his plate, drink, and utensils so that he could more easily access them. He completed trials of mechanical soft solids with no overt s/s of aspiration. Educated pt on previous recommendation for nectar thick liquids. Pt's upper dentures were out, but he did not want to put them in and appeared to have no difficulty chewing his food without them. SLP will return after pt's meal to complete swallow exercises.   KAYLI Vela 2018 8:51 AM    Visit Dx:      ICD-10-CM ICD-9-CM   1. Cerebrovascular accident (CVA), unspecified mechanism (CMS/[facility]) I63.9 434.91   2. Oropharyngeal dysphagia R13.12 787.22   3. Impaired mobility and ADLs Z74.09 799.89     Patient Active Problem List   Diagnosis   • Cerebrovascular accident (CVA) (CMS/[facility])       Therapy Treatment    Therapy Treatment / Health Promotion    Treatment Time/Intention  Discipline: speech language pathologist (18 0836 : Dariela Benitez CCC-SLP)  Document Type: therapy note (daily note) (18 0836 : Dariela Benitez CCC-SLP)  Subjective Information: no complaints (18 0836 : Dariela Benitez CCC-SLP)  Mode of Treatment: speech-language pathology (18 0836 : Dariela Benitez CCC-SLP)  Patient Effort: good (18 0836 : Dariela Benitez CCC-SLP)  Plan of Care Review  Plan of Care Reviewed With: patient (18 0849 : Benitez, Marilla B, CCC-SLP)    Vitals/Pain/Safety  Pain Scale: FACES  Pre/Post-Treatment  Pain: FACES Scale, Pretreatment: 0-->no hurt (08/13/18 0836 : Dariela Benitez, CCC-SLP)    Cognition, Communication, Swallow       Outcome Summary  Outcome Summary/Treatment Plan (SLP)  Daily Summary of Progress (SLP): progress toward functional goals as expected (08/13/18 0836 : Dariela Benitez, CCC-SLP)            SLP GOALS     Row Name 08/13/18 0836 08/11/18 1330          Oral Nutrition/Hydration Goal 1 (SLP)    Oral Nutrition/Hydration Goal 1, SLP LTG: Patient will tolerate LRD with no overt s/s of aspiration.  -MB LTG: Patient will tolerate LRD with no overt s/s of aspiration.  -KW     Time Frame (Oral Nutrition/Hydration Goal 1, SLP) by discharge  -MB by discharge  -KW     Barriers (Oral Nutrition/Hydration Goal 1, SLP) none  -MB n/a  -KW     Progress/Outcomes (Oral Nutrition/Hydration Goal 1, SLP) continuing progress toward goal  -MB goal ongoing  -KW        Pharyngeal Strengthening Exercise Goal 1 (SLP)    Activity (Pharyngeal Strengthening Goal 1, SLP) increase timing;increase epiglottic inversion and retroflexion;increase tongue base retraction  -MB increase timing;increase epiglottic inversion and retroflexion;increase tongue base retraction  -KW     Increase Timing gustatory stimulation (sour/cold)  -MB gustatory stimulation (sour/cold)  -KW     Increase Epiglottic Inversion and Retroflexion Mendelsohn;falsetto  -MB Mendelsohn;falsetto  -KW     Increase Tongue Base Retraction hard effortful swallow;imani  -MB hard effortful swallow;imani  -KW     Justice/Accuracy (Pharyngeal Strengthening Goal 1, SLP) independently (over 90% accuracy)  -MB independently (over 90% accuracy)  -KW     Time Frame (Pharyngeal Strengthening Goal 1, SLP) short term goal (STG);by discharge  -MB short term goal (STG);by discharge  -KW     Barriers (Pharyngeal Strengthening Goal 1, SLP) n/a  -MB n/a  -KW     Progress/Outcomes (Pharyngeal Strengthening Goal 1, SLP) goal ongoing  -MB goal ongoing   -KW       User Key  (r) = Recorded By, (t) = Taken By, (c) = Cosigned By    Initials Name Provider Type    Dariela Bellamy, CCC-SLP Speech and Language Pathologist    Aubree Cintron MS CCC-SLP Speech and Language Pathologist          EDUCATION  The patient has been educated in the following areas:   Dysphagia (Swallowing Impairment).    SLP Recommendation and Plan                                        Plan of Care Reviewed With: patient  Plan of Care Review  Plan of Care Reviewed With: patient  Daily Summary of Progress (SLP): progress toward functional goals as expected  Progress: no change  Outcome Summary: Pt had breakfast tray in front of him upon SLP arrival. He was holding his fork, but hadn't began eating as he was reclined back in a poor position to feed himself. SLP assisted with sitting pt's bed upright and positioning his plate, drink, and utensils so that he could more easily access them. He completed trials of mechanical soft solids with no overt s/s of aspiration. Educated pt on previous recommendation for nectar thick liquids. Pt's upper dentures were out, but he did not want to put them in and appeared to have no difficulty chewing his food without them. SLP will return after pt's meal to complete swallow exercises.        SLP Outcome Measures (last 72 hours)      SLP Outcome Measures     Row Name 08/11/18 1400             SLP Outcome Measures    Outcome Measure Used? Adult NOMS  -KW         Adult FCM Scores    FCM Chosen Swallowing  -KW      Swallowing FCM Score 5  -KW        User Key  (r) = Recorded By, (t) = Taken By, (c) = Cosigned By    Initials Name Effective Dates    Aubree Cintron MS [facility]-SLP 08/02/16 -              Time Calculation:         Time Calculation- SLP     Row Name 08/13/18 0851             Time Calculation- SLP    SLP Start Time 0836  -MB      SLP Stop Time 0846  -MB      SLP Time Calculation (min) 10 min  -MB      SLP Received On 08/13/18  -MB        User Key  (r)  = Recorded By, (t) = Taken By, (c) = Cosigned By    Initials Name Provider Type    Dariela Bellamy CCC-SLP Speech and Language Pathologist          Therapy Charges for Today     Code Description Service Date Service Provider Modifiers Qty    [id number]  ST TREATMENT SWALLOW 1 8/13/2018 Dariela Benitez CCC-SLP GN, KX 1          SLP G-Codes  SLP NOMS Used?: Yes  Functional Limitations: Swallowing  Swallow Current Status (): At least 20 percent but less than 40 percent impaired, limited or restricted  Swallow Goal Status (): At least 1 percent but less than 20 percent impaired, limited or restricted      KAYLI Vela  8/13/2018

## 2018-08-13 NOTE — PLAN OF CARE
Problem: Patient Care Overview  Goal: Plan of Care Review  Outcome: Ongoing (interventions implemented as appropriate)   08/13/18 1145   Coping/Psychosocial   Plan of Care Reviewed With patient   Plan of Care Review   Progress improving   OTHER   Outcome Summary Pt min/mod A for all bed mobility. Pt and daughter educated on SROM/PROM for LUE along with positioning and protection of LUE.. 1 finger width of subluxation both noted at L shoulder while seated EOB. Pt CGA/mod A with verbal cues for sit balance EOB. Pt would benefit from SNF for continued rehab prior to returning home. Continue OT POC

## 2018-08-13 NOTE — PLAN OF CARE
Problem: Patient Care Overview  Goal: Plan of Care Review  Outcome: Ongoing (interventions implemented as appropriate)   08/13/18 2287   Coping/Psychosocial   Plan of Care Reviewed With patient;spouse   Plan of Care Review   Progress no change   OTHER   Outcome Summary Provided DM education. Pt was very tired at time of visit, mostly spoke with pts wife. Discussed CHO counting, appropriate food choices, appropriate beverages, and consistency with meals. MD arrived, will follow up for questions and to provide contact information. Continue to follow.

## 2018-08-13 NOTE — PLAN OF CARE
Problem: Patient Care Overview  Goal: Plan of Care Review  Outcome: Ongoing (interventions implemented as appropriate)   08/13/18 1150   Coping/Psychosocial   Plan of Care Reviewed With patient;caregiver   OTHER   Outcome Summary PT IE complete. Pt mod/max for bed mobility and static sitting balance EOB. 0/5 LUE/LLE this morning. Weight bearing through LUE in sitting w/ max assist to stabilize. Pt fatigued this morning and unable to assess standing. Pt to receive skilled PT to address functional mobility deficits and strength/balance impairments. Recommend SNF at D/C. Thank you for referral.

## 2018-08-13 NOTE — PROGRESS NOTES
"Continued Stay Note   [place]     Patient Name: Angelika Sapp  MRN: [id number]  Today's Date: 8/13/2018    Admit Date: 8/11/2018          Discharge Plan     Row Name 08/13/18 1040       Plan    Plan Undetermined - See note    Patient/Family in Agreement with Plan yes    Plan Comments SW spoke to patient's wife re discharge planning. Patient's wife was difficult to speak to and would not make a decision re discharge disposition. Wife kept deferring SW to \"her son\" who is not present, but she asked that SW call him. SW did call the son, Freddy, but was unable to reach him at this time. SW insisted that wife make a decision re discharge planning. Wife says she will get back with SW re this.                 Yolanda Rice, BSW    "

## 2018-08-13 NOTE — PROGRESS NOTES
[facility] Medicine Services  INPATIENT PROGRESS NOTE    Patient Name: Angelika Sapp  Date of Admission: 8/11/2018  Today's Date: 08/13/18  Length of Stay: 2  Primary Care Physician: Sergio Ma MD    Subjective   Chief Complaint: left sided weakness    HPI   Currently sitting up in bed.  No family in room when I saw the patient the first time today around 0730.  Still having decreased sensation and movement in his left upper and lower extremity.  He is not having any chest pain or shortness of breath.  Went back up to his room this afternoon after reviewing records from [facility].  Discussed with wife results as well as treatment plan.  She wishes for him to be listed at [facility] since they live in [place] and they are now realizing that this is going not to be a quick recovery.     Review of Systems   Constitutional: Positive for activity change. Negative for appetite change.   HENT: Negative for hearing loss, nosebleeds, tinnitus and trouble swallowing.    Eyes: Negative for visual disturbance.   Respiratory: Negative for chest tightness, shortness of breath and wheezing.    Cardiovascular: Negative for palpitations and leg swelling.   Gastrointestinal: Negative for abdominal distention, blood in stool, constipation and diarrhea.   Endocrine: Negative for cold intolerance, heat intolerance, polydipsia, polyphagia and polyuria.   Genitourinary: Negative for decreased urine volume, difficulty urinating, dysuria, flank pain, frequency and hematuria.   Allergic/Immunologic: Negative for immunocompromised state.   Neurological: Positive for light-headedness. Negative for dizziness and syncope.   Hematological: Negative for adenopathy. Does not bruise/bleed easily.   Psychiatric/Behavioral: Positive for confusion (intermittently ). Negative for sleep disturbance. The patient is not nervous/anxious.       All pertinent negatives and positives are as above. All other systems have  been reviewed and are negative unless otherwise stated.     Objective    Temp:  [97.8 °F (36.6 °C)-98.7 °F (37.1 °C)] 98.7 °F (37.1 °C)  Heart Rate:  [53-78] 55  Resp:  [18-20] 18  BP: (137-178)/(60-85) 156/60     Physical Exam   Constitutional: He is oriented to person, place, and time. He appears well-developed and well-nourished.   Obese. NAD.  Sitting up in bed.     HENT:   Head: Normocephalic and atraumatic.   Eyes: Pupils are equal, round, and reactive to light. Conjunctivae and EOM are normal.   Neck: Neck supple. No JVD present. No thyromegaly present.   Cardiovascular: Regular rhythm, normal heart sounds and intact distal pulses.  Bradycardia present.  Exam reveals no gallop and no friction rub.    No murmur heard.  SB 50-71   Pulmonary/Chest: Effort normal and breath sounds normal. No respiratory distress. He has no wheezes. He has no rales. He exhibits no tenderness.   diminished    Abdominal: Soft. Bowel sounds are normal. He exhibits distension (obese abdomen ). There is no tenderness. There is no rebound and no guarding.   Musculoskeletal: He exhibits no edema, tenderness or deformity.   Lymphadenopathy:     He has no cervical adenopathy.   Neurological: He is alert and oriented to person, place, and time. A sensory deficit is present.   Left upper and lower extremity flaccid    Skin: Skin is warm and dry. No rash noted.   Psychiatric: He has a normal mood and affect. His behavior is normal. Judgment and thought content normal.   Nursing note and vitals reviewed.    Results Review:  I have reviewed the labs, radiology results, and diagnostic studies.    Laboratory Data:     Results from last 7 days  Lab Units 08/13/18  0643 08/12/18  1000 08/11/18  0958   WBC 10*3/mm3 6.16 6.11 7.39   HEMOGLOBIN g/dL 12.3* 13.9* 15.2   HEMATOCRIT % 35.7* 40.7  40.4 42.5   PLATELETS 10*3/mm3 199 238  255 300       Results from last 7 days  Lab Units 08/13/18  0643 08/12/18  1000 08/11/18  0958   SODIUM mmol/L 138 137  139   POTASSIUM mmol/L 4.3 4.6 4.1   CHLORIDE mmol/L 106 102 103   CO2 mmol/L 25.0 24.0 22.0*   BUN mg/dL 23* 27* 26*   CREATININE mg/dL 1.15 1.29 1.48*   CALCIUM mg/dL 8.4 9.0 9.8   BILIRUBIN mg/dL  --   --  0.6   ALK PHOS U/L  --   --  44   ALT (SGPT) U/L  --   --  15   AST (SGOT) U/L  --   --  28   GLUCOSE mg/dL 182* 297* 109*       Results from last 7 days  Lab Units 08/12/18  1000   CHOLESTEROL mg/dL 167   TRIGLYCERIDES mg/dL 198*   HDL CHOL mg/dL 30*       Results from last 7 days  Lab Units 08/12/18  1000   LDL CHOL mg/dL 101*     Radiology Data:   Imaging Results (last 24 hours)     Procedure Component Value Units Date/Time    US Carotid Bilateral [[id number]] Collected:  08/13/18 1137     Updated:  08/13/18 1141    Narrative:       History: Stroke       Impression:       Impression:  1. There is less than 50% stenosis of the right internal carotid artery.  2. There is less than 50% stenosis of the left internal carotid artery.  3. Antegrade flow is demonstrated in bilateral vertebral arteries.     Comments: Bilateral carotid vertebral arterial duplex scan was  performed.     Grayscale imaging shows intimal thickening and calcified elements at the  carotid bifurcation. The right internal carotid artery peak systolic  velocity is 51.9 cm/sec. The end-diastolic velocity is 13 cm/sec. The  right ICA/CCA ratio is approximately 0.65 . These findings correlate  with less than 50% stenosis of the right internal carotid artery.     Grayscale imaging shows intimal thickening and calcified elements at the  carotid bifurcation. The left internal carotid artery peak systolic  velocity is 79 cm/sec. The end-diastolic velocity is 22.4 cm/sec. The  left ICA/CCA ratio is approximately 0.72 . These findings correlate with  less than 50% stenosis of the left internal carotid artery.     Antegrade flow is demonstrated in bilateral vertebral arteries.  There is greater than 50% stenosis of the left external carotid artery.  This  report was finalized on 08/13/2018 11:38 by Dr. Yo Cohen MD.    CT Head Without Contrast [[id number]] Collected:  08/12/18 1703     Updated:  08/12/18 1708    Narrative:       CT BRAIN without contrast 8/12/2018 4:34 PM CDT     HISTORY: Worsening neuro deficit     COMPARISON: August 11, 2018      DLP: 710 mGy cm     TECHNIQUE: Serial axial tomographic images of the brain were obtained  without the use of intravenous contrast.      FINDINGS:   The midline structures are nondisplaced. There is mild to moderate  cerebral and cerebellar volume loss, with an associated increase in the  prominence of the ventricles and sulci. The basilar cisterns are normal  in size and configuration. There is no evidence of intracranial  hemorrhage or mass-effect. There is low attenuation in the  periventricular white matter, consistent with chronic ischemic change.  Right basal ganglia lacunar infarctions noted. There are no abnormal  extra-axial fluid collections. There is no evidence of tonsillar  herniation.      The included orbits and their contents are unremarkable. The visualized  paranasal sinuses, mastoid air cells and middle ear cavities are clear.  The visualized osseous structures and overlying soft tissues of the  skull and face are intact.        Impression:       Changes of aging stable since August 11, 2018 no acute intracranial  abnormality..        This report was finalized on 08/12/2018 17:05 by Dr. Cesar Rodriguez MD.          I have reviewed the patient's current medications.     Assessment/Plan     Assessment/Plan:  1.  Right lacunar infarct with extension into the right paraventricular region in the right hemisphere              - Appreciate Neurology's assistance              - MRI brain: right basal ganglia stroke with extension into the right paraventricular region.  [facility] MRI report shows right basal ganglia stroke as well, likely extension of previous stroke last Sunday.               - 2D  echocardiogram with bubble study: Negative bubble study, LVEF 65%, diastolic dysfunctioni. Mild AS.              - Bilateral Carotid Duplex: Negative              - PT/OT              - Speech evaluation: soft foods with nectar thick liquids               - Lipid profile:               - HghA1c 8.3              - Resume ASA and Plavix.              - Hold Lovenox for DVT prophylaxis, SCDs BLE      2.  Hyperlipidemia              - Lipid profile:               - Lipitor 80 mg nightly              - Continue Tricor     3.  Insulin dependent diabetes mellitus, type II, with hyperglycemia               - HgbA1c 8.3              - Insulin Levemir 25 units nightly              - Accu checks ac/hs              - Glucoses: 189, 182, 170, 305.     4.  Parkinson's Disease              - Continue Sinemet      5.  Hypertension              - Resume ARB, Losartan substituted for Benicar per formulary    - Permissive HTN, Hydralazine on hold      6.  Chronic kidney disease, stage III              - Discontinue IV fluids               - Recheck BMP in AM     7.  Gastroesophageal Reflux disease              - Protonix     8.  Depression              - Resume Trazodone and Lexapro    Discharge Planning: I expect the patient to be discharged to [facility] in 1-2 days.    BRENNA Trinh   08/13/18   1:09 PM    Discussed with BRENNA Kaur and agree with the assessment, treatment plan, and disposition of the patient as recorded by her.     He has a bed at [facility] tomorrow per his wife's request. He should be medically ready to go there. Neurology was awaiting outside films and making final decisions on antiplatelet therapy. They have not evaluated yet today. Currently on ASA/Plavix.     Carotids clean. Negative echocardiogram with regards to clots, shunts, masses. Telemetry has shown only sinus.     BP to be more strictly controlled soon. On high dose atorvastatin. Needs more strict glycemic control.      B12 level is 209. Needs B12 replacement IM for now.     Harris Patel,   08/13/18  3:28 PM

## 2018-08-13 NOTE — THERAPY TREATMENT NOTE
Acute Care - Speech Language Pathology   Swallow Treatment Note [facility]     Patient Name: Angelika Sapp  : 1940  MRN: [id number]  Today's Date: 2018  Onset of Illness/Injury or Date of Surgery: 18     Referring Physician: Mick Francisco      Admit Date: 2018  Pt participated with swallow therapy with good effort. He completed 5 reps of cold bolus stim, effortful, and imani exercises. He had delays of 3 to 12 seconds with cold bolus stimulation. He completed multiple trials of thin water during therapy with no overt s/s of aspiration observed. Ok to upgrade pt to thin  liquids, continue mechanical soft solids.   KAYLI Vela 2018 1:45 PM    Visit Dx:      ICD-10-CM ICD-9-CM   1. Cerebrovascular accident (CVA), unspecified mechanism (CMS/HCC) I63.9 434.91   2. Oropharyngeal dysphagia R13.12 787.22   3. Impaired mobility and ADLs Z74.09 799.89   4. Impaired mobility Z74.09 799.89     Patient Active Problem List   Diagnosis   • Cerebrovascular accident (CVA) (CMS/[facility])       Therapy Treatment    Therapy Treatment / Health Promotion    Treatment Time/Intention  Discipline: speech language pathologist (18 1300 : Dariela Benitez CCC-SLP)  Document Type: therapy note (daily note) (18 1300 : Dariela Benitez CCC-SLP)  Subjective Information: no complaints (18 1300 : Dariela Benitez CCC-SLP)  Mode of Treatment: speech-language pathology (18 1300 : Dariela Benitez CCC-SLP)  Patient Effort: good (18 1300 : Dariela Benitez CCC-SLP)  Plan of Care Review  Plan of Care Reviewed With: patient, spouse (18 1342 : Benitez, Marilla B, CCC-SLP)    Vitals/Pain/Safety  Pain Scale: FACES Pre/Post-Treatment  Pain: FACES Scale, Pretreatment: 0-->no hurt (18 1300 : Dariela Benitez CCC-SLP)    Cognition, Communication, Swallow  Recommendations  Anticipated Dischage Disposition: inpatient rehabilitation facility (18 1300 : Eli  Dariela POSADAS, CCC-SLP)    Outcome Summary  Outcome Summary/Treatment Plan (SLP)  Daily Summary of Progress (SLP): progress toward functional goals as expected (08/13/18 1300 : Dariela Benitez, CCC-SLP)  Barriers to Overall Progress (SLP): none (08/13/18 1300 : Dariela Benitez, CCC-SLP)  Plan for Continued Treatment (SLP): Upgrade to thin, continue mechanical soft solids (08/13/18 1300 : Dariela Benitez, CCC-SLP)  Anticipated Dischage Disposition: inpatient rehabilitation facility (08/13/18 1300 : Dariela Benitez, CCC-SLP)            SLP GOALS     Row Name 08/13/18 1300 08/13/18 0836 08/11/18 1330       Oral Nutrition/Hydration Goal 1 (SLP)    Oral Nutrition/Hydration Goal 1, SLP LTG: Patient will tolerate LRD with no overt s/s of aspiration.  -MB LTG: Patient will tolerate LRD with no overt s/s of aspiration.  -MB LTG: Patient will tolerate LRD with no overt s/s of aspiration.  -KW    Time Frame (Oral Nutrition/Hydration Goal 1, SLP) by discharge  -MB by discharge  -MB by discharge  -KW    Barriers (Oral Nutrition/Hydration Goal 1, SLP) none  -MB none  -MB n/a  -KW    Progress/Outcomes (Oral Nutrition/Hydration Goal 1, SLP) continuing progress toward goal  -MB continuing progress toward goal  -MB goal ongoing  -KW       Pharyngeal Strengthening Exercise Goal 1 (SLP)    Activity (Pharyngeal Strengthening Goal 1, SLP) increase timing;increase epiglottic inversion and retroflexion;increase tongue base retraction  -MB increase timing;increase epiglottic inversion and retroflexion;increase tongue base retraction  -MB increase timing;increase epiglottic inversion and retroflexion;increase tongue base retraction  -KW    Increase Timing gustatory stimulation (sour/cold)  -MB gustatory stimulation (sour/cold)  -MB gustatory stimulation (sour/cold)  -KW    Increase Epiglottic Inversion and Retroflexion Mendelsohn;falsetto  -MB Mendelsohn;falsetto  -MB Mendelsohn;falsetto  -KW    Increase Tongue Base Retraction hard  effortful swallow;imani  -MB hard effortful swallow;imani  -MB hard effortful swallow;imani  -KW    [place]/Accuracy (Pharyngeal Strengthening Goal 1, SLP) independently (over 90% accuracy)  -MB independently (over 90% accuracy)  -MB independently (over 90% accuracy)  -KW    Time Frame (Pharyngeal Strengthening Goal 1, SLP) short term goal (STG);by discharge  -MB short term goal (STG);by discharge  -MB short term goal (STG);by discharge  -KW    Barriers (Pharyngeal Strengthening Goal 1, SLP) none  -MB n/a  -MB n/a  -KW    Progress/Outcomes (Pharyngeal Strengthening Goal 1, SLP) continuing progress toward goal  -MB goal ongoing  -MB goal ongoing  -KW      User Key  (r) = Recorded By, (t) = Taken By, (c) = Cosigned By    Initials Name Provider Type    Dariela Bellamy, CCC-SLP Speech and Language Pathologist    Aubree Cintron MS CCC-SLP Speech and Language Pathologist          EDUCATION  The patient has been educated in the following areas:   Dysphagia (Swallowing Impairment).    SLP Recommendation and Plan                       Anticipated Dischage Disposition: inpatient rehabilitation facility                Plan of Care Reviewed With: patient, spouse  Plan of Care Review  Plan of Care Reviewed With: patient, spouse  Daily Summary of Progress (SLP): progress toward functional goals as expected  Plan for Continued Treatment (SLP): Upgrade to thin, continue mechanical soft solids  Progress: improving  Outcome Summary: Pt participated with swallow therapy with good effort. He completed 5 reps of cold bolus stim, effortful, and imani exercises. He had delays of 3 to 12 seconds with cold bolus stimulation. He completed multiple trials of thin water during therapy with no overt s/s of aspiration observed. Ok to upgrade pt to thin  liquids, continue mechanical soft solids.        SLP Outcome Measures (last 72 hours)      SLP Outcome Measures     Row Name 08/11/18 1400             SLP Outcome Measures     Outcome Measure Used? Adult NOMS  -KW         Adult FCM Scores    FCM Chosen Swallowing  -KW      Swallowing FCM Score 5  -KW        User Key  (r) = Recorded By, (t) = Taken By, (c) = Cosigned By    Initials Name Effective Dates    Aubree Cintron MS CCC-SLP 08/02/16 -              Time Calculation:         Time Calculation- SLP     Row Name 08/13/18 1344 08/13/18 0851          Time Calculation- SLP    SLP Start Time 1321  -MB 0836  -MB     SLP Stop Time 1340  -MB 0846  -MB     SLP Time Calculation (min) 19 min  -MB 10 min  -MB     SLP Received On 08/13/18  -MB 08/13/18  -MB       User Key  (r) = Recorded By, (t) = Taken By, (c) = Cosigned By    Initials Name Provider Type    Dariela Bellamy CCC-SLP Speech and Language Pathologist          Therapy Charges for Today     Code Description Service Date Service Provider Modifiers Qty    48394824447 HC ST TREATMENT SWALLOW 1 8/13/2018 Dariela Benitez CCC-SLP GN, KX 1    [id number] HC ST TREATMENT SWALLOW 1 8/13/2018 Dariela Benitez CCC-SLP GN, KX 1          SLP G-Codes  SLP NOMS Used?: Yes  Functional Limitations: Swallowing  Swallow Current Status (): At least 20 percent but less than 40 percent impaired, limited or restricted  Swallow Goal Status (): At least 1 percent but less than 20 percent impaired, limited or restricted      KAYLI Vela  8/13/2018

## 2018-08-13 NOTE — PAYOR COMM NOTE
"FROM: JOSE L NICHOLS  PHONE: [phone]  FAX: [phone]    [id number]    Angelika Sinclair (78 y.o. Male)     Date of Birth Social Security Number Address Home Phone MRN    [id number]  [id number] [facility] [address] [phone] [id number]    [facility] Marital Status          Other        Admission Date Admission Type Admitting Provider Attending Provider Department, Room/Bed    18 Emergency Harris Patel DO Hancock, John C, DO [facility] 3A, 335/1    Discharge Date Discharge Disposition Discharge Destination                       Attending Provider:  Harris Patel DO    Allergies:  No Known Allergies    Isolation:  None   Infection:  None   Code Status:  CPR    Ht:  182.9 cm (72.01\")   Wt:  95.3 kg (210 lb)    Admission Cmt:  None   Principal Problem:  None                Active Insurance as of 2018     Primary Coverage     Payor Plan Insurance Group Employer/Plan Group    MEDICARE MEDICARE A & B      Payor Plan Address Payor Plan Phone Number Effective From Effective To    [address] [phone] 2003     [facility] [id number]       Subscriber Name Subscriber Birth Date Member ID       ANGELIKA SINCLAIR 1940 [id number]           Secondary Coverage     Payor Plan Insurance Group Employer/Plan Group    AETNA COMMERCIAL AETNA [facility] [id number]     Payor Plan Address Payor Plan Phone Number Effective From Effective To    [address] [phone] 2013     [facility] [id number]       Subscriber Name Subscriber Birth Date Member ID       ANGELIKA SINCLAIR 1940 [id number]                 Emergency Contacts      (Rel.) Home Phone Work Phone Mobile Phone    Patricia Prieto (Spouse) [phone] -- --    Freddy Aranda (Son) -- [phone] --               History & Physical      Mick Francisco APRN at 2018 12:20 PM     Attestation signed by Alex Martinez DO at 2018  2:06 PM    I personally evaluated and examined the " patient in conjunction with BRENNA Gonzalez and agree with the assessment, treatment plan, and disposition of the patient as recorded by her. My history, exam, and further recommendations are:     Assessment/Plan:  1.  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  Hyperlipidemia              - Check Lipid profile              - Lipitor 80 mg nightly              - Continue Tricor     3.  Insulin dependent diabetes mellitus, type II              - Check HgbA1c              - Hold Insulin              - Accu checks ac/hs              - Currently NPO     4.  Parkinson's Disease              - Continue Sinemet      5.  Hypertension              - Resume ARB, Losartan substituted for Benicar per formulary               - Hydralazine 25 mg BID     6.  Chronic kidney disease, stage III              - NS @ 100 ml/hr               - Recheck BMP in AM     7.  Gastroesophageal Reflux disease              - Protonix     8.  Depression              - Resume Trazodone and Lexapro    Alex Martinez DO  08/11/18  2:05 PM                        [facility] Medicine Services  HISTORY AND PHYSICAL    Date of Admission: 8/11/2018  Primary Care Physician: Sergio Ma MD    Subjective     Chief Complaint: Left-sided weakness    History of Present Illness  The patient is a 78-year-old  male who presented to [facility] emergency department complaining of left-sided weakness.  The patient has past medical history significant for a previous cerebrovascular  accident, diabetes on insulin therapy, hyperlipidemia, hypertension, Parkinson's disease, depression and GERD.  It also appears that he has a form of chronic kidney disease however unsure of the stage at this point.  He previously had a stroke almost 2 years ago and suffered a left-sided weakness.  Since that time he has regained some use of his left upper and lower extremities.  He apparently suffered a fall on Sunday last week and was hospitalized at [facility] in [place], [place].  He was discharged yesterday from [facility] to [facility] nursing and rehabilitation.  The patient states that he developed increased weakness to his left side and increased numbness at around midnight and it progressively worsened throughout the night.  Her the nursing staff at [facility], the nurse's aide was able to dress him at 7:30 without any difficulty.  Then at 8:30 this morning he had profound left-sided facial weakness and drooling.  Per reports he was unable to chew his food and it was falling out of his mouth.  However the patient insists that it started at midnight, therefore he was out of the window for TPA since his onset was greater than 3-4 hours.  The patient does appear to be of sound mind and body at this time.  He does get a little confused about specifics when he is answering questions.  His granddaughter states that he falls a lot.  Wife, daughter and son-in-law are at the bedside.  The patient denies any shortness of breath, chest pain, nausea, vomiting or diarrhea.  His neurological exam reveals a flaccid left upper and left lower extremity.  He also has decreased sensation on that left side as well.    Review of Systems   Constitutional: Positive for activity change and fatigue. Negative for appetite change, chills and fever.   HENT: Negative for hearing loss, nosebleeds, tinnitus and trouble swallowing.    Eyes: Negative for visual disturbance.   Respiratory:  Negative for cough, chest tightness, shortness of breath and wheezing.    Cardiovascular: Negative for chest pain, palpitations and leg swelling.   Gastrointestinal: Negative for abdominal distention, abdominal pain, blood in stool, constipation, diarrhea, nausea and vomiting.   Endocrine: Negative for cold intolerance, heat intolerance, polydipsia, polyphagia and polyuria.   Genitourinary: Positive for urgency. Negative for decreased urine volume, difficulty urinating, dysuria, flank pain, frequency and hematuria.   Musculoskeletal: Negative for arthralgias, joint swelling and myalgias.   Skin: Negative for rash.   Allergic/Immunologic: Negative for immunocompromised state.   Neurological: Positive for weakness (left side flaccid) and numbness (left upper and lower extremities ). Negative for dizziness, tremors, syncope, light-headedness and headaches.   Hematological: Negative for adenopathy. Does not bruise/bleed easily.   Psychiatric/Behavioral: Negative for confusion and sleep disturbance. The patient is not nervous/anxious.       Otherwise complete ROS reviewed and negative except as mentioned in the HPI.    Past Medical History:   Past Medical History:   Diagnosis Date   • Arthritis    • CVA (cerebral vascular accident) (CMS/HCC)    • Depressive disorder    • Diabetes mellitus (CMS/HCC)    • GERD (gastroesophageal reflux disease)    • Hemiplegia (CMS/HCC)    • Hyperlipidemia    • Hypertension    • Parkinson's disease (CMS/HCC)    • Renal disorder      Past Surgical History:No past surgical history on file.     Social History:  reports that he has never smoked. He has quit using smokeless tobacco. His smokeless tobacco use included Chew. He reports that he does not drink alcohol or use drugs.    Family History: family history includes Alcohol abuse in his father; Alzheimer's disease in his mother.      Allergies:  No Known Allergies  Medications:  Prior to Admission medications    Medication Sig Start Date End  "Date Taking? Authorizing Provider   aspirin 81 MG chewable tablet Chew 81 mg Daily.   Yes Denise Smith MD   carbidopa-levodopa (SINEMET)  MG per tablet Take 2 tablets by mouth 3 (Three) Times a Day.   Yes Denise Smith MD   clopidogrel (PLAVIX) 75 MG tablet Take 75 mg by mouth Daily.   Yes Denise Smith MD   escitalopram (LEXAPRO) 20 MG tablet Take 20 mg by mouth Daily.   Yes Denise Smith MD   fenofibrate (TRIGLIDE) 160 MG tablet Take 160 mg by mouth Every Night.   Yes Denise Smith MD   hydrALAZINE (APRESOLINE) 25 MG tablet Take 25 mg by mouth 2 (Two) Times a Day.   Yes Denise Smith MD   insulin detemir (LEVEMIR) 100 UNIT/ML injection Inject 30 Units under the skin into the appropriate area as directed Every Night.   Yes Provider, Historical, MD   insulin glulisine (APIDRA) 100 UNIT/ML patient supplied pump Inject 35 Units under the skin into the appropriate area as directed Continuous.   Yes Denise Smith MD   olmesartan (BENICAR) 20 MG tablet Take 40 mg by mouth Daily.   Yes Denise Smith MD   pantoprazole (PROTONIX) 40 MG EC tablet Take 40 mg by mouth Daily.   Yes Denise Smith MD   pravastatin (PRAVACHOL) 40 MG tablet Take 40 mg by mouth Every Night.   Yes Denise Smith MD   topiramate (TOPAMAX) 25 MG tablet Take 25 mg by mouth Every Night.   Yes Denise Smith MD   traZODone (DESYREL) 150 MG tablet Take 150 mg by mouth Every Night.   Yes Denise Smith MD     Objective     Vital Signs: /60   Pulse 52   Temp 97.1 °F (36.2 °C) (Temporal Artery )   Resp 19   Ht 182.9 cm (72\")   Wt 102 kg (225 lb)   SpO2 96%   BMI 30.52 kg/m²       Physical Exam   Constitutional: He is oriented to person, place, and time. He appears well-developed and well-nourished.   Obese   HENT:   Head: Normocephalic and atraumatic.   Eyes: Pupils are equal, round, and reactive to light. Conjunctivae and EOM are normal.   Neck: " Neck supple. No JVD present. No thyromegaly present.   Cardiovascular: Regular rhythm, normal heart sounds and intact distal pulses.  Bradycardia present.  Exam reveals no gallop and no friction rub.    No murmur heard.  Sinus kaela 54   Pulmonary/Chest: Effort normal and breath sounds normal. No respiratory distress. He has no wheezes. He has no rales. He exhibits no tenderness.   Diminished bilateral bases   Abdominal: Soft. Bowel sounds are normal. He exhibits distension. There is no tenderness. There is no rebound and no guarding.   Musculoskeletal: He exhibits no edema, tenderness or deformity.   Lymphadenopathy:     He has no cervical adenopathy.   Neurological: He is alert and oriented to person, place, and time. A sensory deficit (left side) is present. No cranial nerve deficit.   Left upper and lower extremity flaccid   Skin: Skin is warm and dry. No rash noted.   Psychiatric: He has a normal mood and affect. His behavior is normal. Judgment and thought content normal.   Nursing note and vitals reviewed.    Results Reviewed:  Lab Results (last 24 hours)     Procedure Component Value Units Date/Time    [place] Draw [[id number]] Collected:  08/11/18 0958    Specimen:  Blood Updated:  08/11/18 1100    Narrative:       The following orders were created for panel order [place] Draw.  Procedure                               Abnormality         Status                     ---------                               -----------         ------                     Light Blue Top[[id number]]                                   Final result               Green Top (Gel)[[id number]]                                  Final result               Lavender Top[[id number]]                                     Final result               Red Top[[id number]]                                          Final result                 Please view results for these tests on the individual orders.    Light Blue Top [[id number]] Collected:  08/11/18 0958     Specimen:  Blood from Arm, Left Updated:  08/11/18 1100     Extra Tube hold for add-on     Comment: Auto resulted       Green Top (Gel) [[id number]] Collected:  08/11/18 0958    Specimen:  Blood from Arm, Left Updated:  08/11/18 1100     Extra Tube Hold for add-ons.     Comment: Auto resulted.       Lavender Top [[id number]] Collected:  08/11/18 0958    Specimen:  Blood from Arm, Left Updated:  08/11/18 1100     Extra Tube hold for add-on     Comment: Auto resulted       Red Top [[id number]] Collected:  08/11/18 0958    Specimen:  Blood from Arm, Left Updated:  08/11/18 1100     Extra Tube Hold for add-ons.     Comment: Auto resulted.       aPTT [[id number]]  (Normal) Collected:  08/11/18 0958    Specimen:  Blood from Arm, Left Updated:  08/11/18 1048     PTT 27.9 seconds     Protime-INR [[id number]]  (Normal) Collected:  08/11/18 0958    Specimen:  Blood from Arm, Left Updated:  08/11/18 1048     Protime 12.6 Seconds      INR 0.92    Troponin [[id number]]  (Normal) Collected:  08/11/18 0958    Specimen:  Blood from Arm, Left Updated:  08/11/18 1035     Troponin I 0.015 ng/mL     Comprehensive Metabolic Panel [[id number]]  (Abnormal) Collected:  08/11/18 0958    Specimen:  Blood from Arm, Left Updated:  08/11/18 1025     Glucose 109 (H) mg/dL      BUN 26 (H) mg/dL      Creatinine 1.48 (H) mg/dL      Sodium 139 mmol/L      Potassium 4.1 mmol/L      Chloride 103 mmol/L      CO2 22.0 (L) mmol/L      Calcium 9.8 mg/dL      Total Protein 7.5 g/dL      Albumin 4.50 g/dL      ALT (SGPT) 15 U/L      AST (SGOT) 28 U/L      Alkaline Phosphatase 44 U/L      Total Bilirubin 0.6 mg/dL      eGFR Non African Amer 46 (L) mL/min/1.73      Globulin 3.0 gm/dL      A/G Ratio 1.5 g/dL      BUN/Creatinine Ratio 17.6     Anion Gap 14.0 (H) mmol/L     Narrative:       The MDRD GFR formula is only valid for adults with stable renal function between ages 18 and 70.    CBC & Differential [[id number]] Collected:  08/11/18 0958    Specimen:  Blood  Updated:  08/11/18 1012    Narrative:       The following orders were created for panel order CBC & Differential.  Procedure                               Abnormality         Status                     ---------                               -----------         ------                     CBC Auto Differential[[id number]]        Abnormal            Final result                 Please view results for these tests on the individual orders.    CBC Auto Differential [[id number]]  (Abnormal) Collected:  08/11/18 0958    Specimen:  Blood from Arm, Left Updated:  08/11/18 1012     WBC 7.39 10*3/mm3      RBC 4.59 (L) 10*6/mm3      Hemoglobin 15.2 g/dL      Hematocrit 42.5 %      MCV 92.6 fL      MCH 33.1 (H) pg      MCHC 35.8 g/dL      RDW 12.9 %      RDW-SD 43.7 fl      MPV 10.1 fL      Platelets 300 10*3/mm3      Neutrophil % 60.7 %      Lymphocyte % 24.0 %      Monocyte % 12.3 (H) %      Eosinophil % 1.8 %      Basophil % 0.7 %      Immature Grans % 0.5 %      Neutrophils, Absolute 4.49 10*3/mm3      Lymphocytes, Absolute 1.77 10*3/mm3      Monocytes, Absolute 0.91 10*3/mm3      Eosinophils, Absolute 0.13 10*3/mm3      Basophils, Absolute 0.05 10*3/mm3      Immature Grans, Absolute 0.04 (H) 10*3/mm3      nRBC 0.0 /100 WBC     POC Glucose Once [[id number]]  (Normal) Collected:  08/11/18 0947    Specimen:  Blood Updated:  08/11/18 1006     Glucose 130 mg/dL      Comment: : [id number] Stanley Hitchcock ID: [id number]           Imaging Results (last 24 hours)     Procedure Component Value Units Date/Time    CT Head Without Contrast [[id number]] Collected:  08/11/18 1044     Updated:  08/11/18 1048    Narrative:       EXAMINATION:   CT HEAD WO CONTRAST-  8/11/2018 10:44 AM CDT     CT BRAIN without contrast 8/11/2018 10:25 AM CDT     HISTORY: Weakness     COMPARISON: None      DLP: 661 mGy cm     TECHNIQUE: Serial axial tomographic images of the brain were obtained  without the use of intravenous contrast.      FINDINGS:    The midline structures are nondisplaced. There is moderate cerebral and  cerebellar volume loss, with an associated increase in the prominence of  the ventricles and sulci. The basilar cisterns are normal in size and  configuration. There is no evidence of intracranial hemorrhage or  mass-effect. There is low attenuation in the periventricular white  matter, consistent with chronic ischemic change. There are no abnormal  extra-axial fluid collections. There is no evidence of tonsillar  herniation.      The included orbits and their contents are unremarkable. The visualized  paranasal sinuses, mastoid air cells and middle ear cavities are clear.  The visualized osseous structures and overlying soft tissues of the  skull and face are intact.        Impression:       Changes of aging with no acute intracranial abnormality  This report was finalized on 08/11/2018 10:45 by Dr. Cesar Rodriguez MD.    XR Chest 1 View [[id number]] Collected:  08/11/18 1020     Updated:  08/11/18 1023    Narrative:       EXAMINATION:   XR CHEST 1 VW-  8/11/2018 10:20 AM CDT     HISTORY: Left-sided weakness     Frontal upright radiograph of the chest 8/11/2018 10:18 AM CDT     COMPARISON: None.     FINDINGS:   The lungs are clear. The cardiomediastinal silhouette and pulmonary  vascularity are within normal limits.      The osseous structures and surrounding soft tissues demonstrate no acute  abnormality.       Impression:       1. No radiographic evidence of acute cardiopulmonary process.        This report was finalized on 08/11/2018 10:20 by Dr. Cesar Rodriguez MD.        I have personally reviewed and interpreted the radiology studies and ECG obtained at time of admission.     Assessment / Plan     Assessment/Plan:  1.  Transient Ischemic attach suspected cerebral vascular accident   - Consult Neurology   - MRI brain without contrast STAT   - 2D echocardiogram with bubble study   - Bilateral Carotid Duplex   - PT/OT   - Speech evaluation,  failed RN bedside swallow   - Check Lipid profile   - Check HghA1c   - Resume ASA and Plavix   - Hold Lovenox for DVT prophylaxis     2.  Hyperlipidemia   - Check Lipid profile   - Lipitor 80 mg nightly   - Continue Tricor    3.  Insulin dependent diabetes mellitus, type II   - Check HgbA1c   - Hold Insulin   - Accu checks ac/hs   - Currently NPO    4.  Parkinson's Disease   - Continue Sinemet     5.  Hypertension   - Resume ARB, Losartan substituted for Benicar per formulary    - Hydralazine 25 mg BID    6.  Chronic kidney disease, stage III   - NS @ 100 ml/hr    - Recheck BMP in AM    7.  Gastroesophageal Reflux disease   - Protonix    8.  Depression   - Resume Trazodone and Lexapro    Code Status: Full     I discussed the patient's findings and my recommendations with the patient, family and Dr. Martinez.    Estimated length of stay 2-3 days.    Mick Francisco, APRN   08/11/18   12:34 PM              Electronically signed by Alex Martinez DO at 8/11/2018  2:06 PM          Emergency Department Notes      Too Marquis RN at 8/11/2018 10:03 AM        Pt reports numbness and tingling started last night around midnight. Nurse states that at 0730 this morning pt was able to dress himself and transfer. At 0830 pt took his morning medications, after medications left arm and leg went flacid.      Too Marquis RN  08/11/18 1005      Electronically signed by Too Marquis RN at 8/11/2018 10:05 AM     Ursula Yen PA at 8/11/2018 10:10 AM     Attestation signed by La Ballesteros MD at 8/11/2018 12:12 PM          For this patient encounter, I reviewed the NP or PA documentation, treatment plan, and medical decision making. La Ballesteros MD 8/11/2018 12:12 PM                  Subjective     Stroke   Presenting symptoms: weakness    Presenting symptoms: no headaches      Patient is an 78-year-old  man who presents to ED by EMS.  Both he and the patient provide most of the  history.  Nursing home records give some history as well however, there is some conflicting history.  Chief complaint is stroke.    The patient describes a history of stroke about one year 7 month ago.  He had severe left lower extremity weakness and moderate left upper extremity weakness with left-sided facial droop.  The patient is at [facility] for rehabilitation.  His baseline before today was ambulating with a walker, left lower extremity profound weakness with moderate left extremity weakness and the continued left-sided facial droop.  He normally is able to eat without any difficulty.    The patient describes at midnight this past evening, he noticed increased weakness to that left side and increased numbness.  He reports this has progressively worsened throughout the night.  We contacted the nursing home and the day nurse who saw him said that nurse's aide was able to dress him at 7:30 without any difficulty.  Wife reports that he always had some difficulty moving that left arm.  At 8:30 this morning, he had profound left-sided facial weakness and drooling.  He was unable to use food as it was Falling out of his mouth.  Wife reports she arrived at the nursing home and notices this as well.  The patient is insistent this all started midnight and has worsened.    Patient is on aspirin and Plavix for his previous stroke.  He denies being on TPA.  He denies any sense of chest pain, pressure, or tightness.    Review of Systems   Constitutional: Positive for activity change.   HENT: Negative.    Eyes: Negative.  Negative for photophobia, pain, discharge, redness, itching and visual disturbance.   Respiratory: Negative.    Cardiovascular: Negative.    Gastrointestinal: Negative.    Genitourinary: Negative.    Musculoskeletal: Negative.    Neurological: Positive for weakness and numbness. Negative for light-headedness and headaches.   Psychiatric/Behavioral: Negative.        Past Medical History:   Diagnosis Date  "  • Arthritis    • CVA (cerebral vascular accident) (CMS/[facility])    • Depressive disorder    • Diabetes mellitus (CMS/HCC)    • GERD (gastroesophageal reflux disease)    • Hemiplegia (CMS/HCC)    • Hyperlipidemia    • Hypertension    • Parkinson's disease (CMS/HCC)    • Renal disorder        No Known Allergies    No past surgical history on file.    No family history on file.    Social History     Social History   • Marital status:      Social History Main Topics   • Drug use: Unknown     Other Topics Concern   • Not on file       Prior to Admission medications    Not on File       Medications   sodium chloride 0.9 % infusion (125 mL/hr Intravenous New Bag 8/11/18 1113)       /60   Pulse (!) 49   Temp 97.1 °F (36.2 °C) (Temporal Artery )   Resp 19   Ht 182.9 cm (72\")   Wt 102 kg (225 lb)   SpO2 93%   BMI 30.52 kg/m²        Objective   Physical Exam   Constitutional: He is oriented to person, place, and time. He appears well-developed and well-nourished.  Non-toxic appearance. No distress.   HENT:   Head: Normocephalic and atraumatic.   Right Ear: External ear normal.   Left Ear: External ear normal.   Nose: Nose normal.   Mouth/Throat: Uvula is midline, oropharynx is clear and moist and mucous membranes are normal.   Eyes: Pupils are equal, round, and reactive to light. Conjunctivae, EOM and lids are normal. Lids are everted and swept, no foreign bodies found.   Neck: Trachea normal, normal range of motion, full passive range of motion without pain and phonation normal. Neck supple. Normal carotid pulses and no JVD present. Carotid bruit is not present. No neck rigidity.   Cardiovascular: Normal rate, regular rhythm, normal heart sounds, intact distal pulses and normal pulses.    Pulmonary/Chest: Effort normal and breath sounds normal. No stridor. No apnea and no tachypnea. No respiratory distress.   Abdominal: Soft. Normal appearance, normal aorta and bowel sounds are normal.   Musculoskeletal: " Normal range of motion.   Neurological: He is alert and oriented to person, place, and time. He has normal strength. A cranial nerve deficit and sensory deficit is present. Coordination and gait abnormal. GCS eye subscore is 4. GCS verbal subscore is 5. GCS motor subscore is 6.   Cranial nerve evaluation:  No aphasia.  PERRLA. Normal visual fields. Normal smooth eye movement.   Left lower facial weakness.  Tongue is midline with normal gag reflex.   Weak left sternocleidomastoid movement.   Left upper extremity and lower extremity are flaccid.  Significant decreased sensation to LLE. Same equally to BUE and face.   No motor deficits.   No ataxia to RUE and RLE.   Skin: Skin is warm, dry and intact. Capillary refill takes less than 2 seconds. He is not diaphoretic. No pallor.   Psychiatric: He has a normal mood and affect. His speech is normal and behavior is normal.   Nursing note and vitals reviewed.      Procedures        Lab Results (last 24 hours)     Procedure Component Value Units Date/Time    POC Glucose Once [[id number]]  (Normal) Collected:  08/11/18 0947    Specimen:  Blood Updated:  08/11/18 1006     Glucose 130 mg/dL      Comment: : [id number] Stanley Sanchez LMeter ID: [id number]       CBC & Differential [[id number]] Collected:  08/11/18 0958    Specimen:  Blood Updated:  08/11/18 1012    Narrative:       The following orders were created for panel order CBC & Differential.  Procedure                               Abnormality         Status                     ---------                               -----------         ------                     CBC Auto Differential[[id number]]        Abnormal            Final result                 Please view results for these tests on the individual orders.    Comprehensive Metabolic Panel [[id number]]  (Abnormal) Collected:  08/11/18 0958    Specimen:  Blood from Arm, Left Updated:  08/11/18 1025     Glucose 109 (H) mg/dL      BUN 26 (H) mg/dL      Creatinine 1.48  (H) mg/dL      Sodium 139 mmol/L      Potassium 4.1 mmol/L      Chloride 103 mmol/L      CO2 22.0 (L) mmol/L      Calcium 9.8 mg/dL      Total Protein 7.5 g/dL      Albumin 4.50 g/dL      ALT (SGPT) 15 U/L      AST (SGOT) 28 U/L      Alkaline Phosphatase 44 U/L      Total Bilirubin 0.6 mg/dL      eGFR Non African Amer 46 (L) mL/min/1.73      Globulin 3.0 gm/dL      A/G Ratio 1.5 g/dL      BUN/Creatinine Ratio 17.6     Anion Gap 14.0 (H) mmol/L     Narrative:       The MDRD GFR formula is only valid for adults with stable renal function between ages 18 and 70.    aPTT [[id number]]  (Normal) Collected:  08/11/18 0958    Specimen:  Blood from Arm, Left Updated:  08/11/18 1048     PTT 27.9 seconds     Protime-INR [[id number]]  (Normal) Collected:  08/11/18 0958    Specimen:  Blood from Arm, Left Updated:  08/11/18 1048     Protime 12.6 Seconds      INR 0.92    Troponin [[id number]]  (Normal) Collected:  08/11/18 0958    Specimen:  Blood from Arm, Left Updated:  08/11/18 1035     Troponin I 0.015 ng/mL     CBC Auto Differential [[id number]]  (Abnormal) Collected:  08/11/18 0958    Specimen:  Blood from Arm, Left Updated:  08/11/18 1012     WBC 7.39 10*3/mm3      RBC 4.59 (L) 10*6/mm3      Hemoglobin 15.2 g/dL      Hematocrit 42.5 %      MCV 92.6 fL      MCH 33.1 (H) pg      MCHC 35.8 g/dL      RDW 12.9 %      RDW-SD 43.7 fl      MPV 10.1 fL      Platelets 300 10*3/mm3      Neutrophil % 60.7 %      Lymphocyte % 24.0 %      Monocyte % 12.3 (H) %      Eosinophil % 1.8 %      Basophil % 0.7 %      Immature Grans % 0.5 %      Neutrophils, Absolute 4.49 10*3/mm3      Lymphocytes, Absolute 1.77 10*3/mm3      Monocytes, Absolute 0.91 10*3/mm3      Eosinophils, Absolute 0.13 10*3/mm3      Basophils, Absolute 0.05 10*3/mm3      Immature Grans, Absolute 0.04 (H) 10*3/mm3      nRBC 0.0 /100 WBC           Ct Head Without Contrast    Result Date: 8/11/2018  Narrative: EXAMINATION:   CT HEAD WO CONTRAST-  8/11/2018 10:44 AM CDT  CT BRAIN  without contrast 8/11/2018 10:25 AM CDT  HISTORY: Weakness  COMPARISON: None  DLP: 661 mGy cm  TECHNIQUE: Serial axial tomographic images of the brain were obtained without the use of intravenous contrast.  FINDINGS: The midline structures are nondisplaced. There is moderate cerebral and cerebellar volume loss, with an associated increase in the prominence of the ventricles and sulci. The basilar cisterns are normal in size and configuration. There is no evidence of intracranial hemorrhage or mass-effect. There is low attenuation in the periventricular white matter, consistent with chronic ischemic change. There are no abnormal extra-axial fluid collections. There is no evidence of tonsillar herniation.  The included orbits and their contents are unremarkable. The visualized paranasal sinuses, mastoid air cells and middle ear cavities are clear. The visualized osseous structures and overlying soft tissues of the skull and face are intact.      Impression: Changes of aging with no acute intracranial abnormality This report was finalized on 08/11/2018 10:45 by Dr. Cesar Rodriguez MD.    Xr Chest 1 View    Result Date: 8/11/2018  Narrative: EXAMINATION:   XR CHEST 1 VW-  8/11/2018 10:20 AM CDT  HISTORY: Left-sided weakness  Frontal upright radiograph of the chest 8/11/2018 10:18 AM CDT  COMPARISON: None.  FINDINGS: The lungs are clear. The cardiomediastinal silhouette and pulmonary vascularity are within normal limits.  The osseous structures and surrounding soft tissues demonstrate no acute abnormality.      Impression: 1. No radiographic evidence of acute cardiopulmonary process.   This report was finalized on 08/11/2018 10:20 by Dr. Cesar Rodriguez MD.      ED Course        I spoken to both Nata Ballesteros and Angel, ER physician and neurologist respectively,  about the patient's conflicting history from the patient and the day nurse from the nursing home. Because the patient is an excellent Historian an of sound mind, we  have to listen to his history.  Therefore, onset time is about midnight.  The patient was out the window to complete TPA.      I spoken with Dr. Odom, hospitalist on-call, who would like the patient admitted under their services.  MDM    Final diagnoses:   Cerebrovascular accident (CVA), unspecified mechanism (CMS/HCC)          Ursula Yen GLORIA Branham  08/11/18 1138      Electronically signed by La Ballesteros MD at 8/11/2018 12:12 PM          Physician Progress Notes (last 72 hours) (Notes from 8/10/2018 12:50 PM through 8/13/2018 12:50 PM)      Jillian Ortiz MD at 8/12/2018 12:26 PM            Neurology Progress Note      Chief Complaint:  Left sided weakness    Subjective     Subjective:    Results of his studies to date:  Melva shows a subacute infarction of the right basal ganglia extending up to the right paraventricular region.  No other acute changes are identified  He apparently had an MRI of his brain done at [facility] but I do not have a copy of that result.  I am trying to obtain the report.  Carotid ULTRASOUND did not show any hemodynamically significant stenosis  TTE was unremarkable, negative bubble study  Urinalysis looks ok    A1c 8.3  B 12 was low at 209  Medications:  Current Facility-Administered Medications   Medication Dose Route Frequency Provider Last Rate Last Dose   • aspirin chewable tablet 81 mg  81 mg Oral Daily Mick Francisco APRN   81 mg at 08/12/18 0810   • atorvastatin (LIPITOR) tablet 80 mg  80 mg Oral Nightly Mick Francisco APRN   80 mg at 08/11/18 2050   • carbidopa-levodopa (SINEMET)  MG per tablet 2 tablet  2 tablet Oral TID Mick Francisco APRN   2 tablet at 08/12/18 0810   • clopidogrel (PLAVIX) tablet 75 mg  75 mg Oral Daily Mick Francisco APRN   75 mg at 08/12/18 0810   • dextrose (D50W) solution 25 g  25 g Intravenous Q15 Min PRN Mick Francisco APRN       • dextrose (GLUTOSE) oral gel 15 g  15 g Oral Q15  Min PRN Mick Francisco APRN       • enalaprilat (VASOTEC) injection 0.625 mg  0.625 mg Intravenous Q6H PRN Mick Francisco APRN       • escitalopram (LEXAPRO) tablet 20 mg  20 mg Oral Daily Mick Francisco APRN   20 mg at 08/12/18 0810   • fenofibrate (TRICOR) tablet 145 mg  145 mg Oral Daily Mick Francisco APRN   145 mg at 08/12/18 0809   • glucagon (human recombinant) (GLUCAGEN DIAGNOSTIC) injection 1 mg  1 mg Subcutaneous PRN Mick Francisco APRN       • insulin detemir (LEVEMIR) injection 15 Units  15 Units Subcutaneous Nightly Mick Francisco APRN       • insulin lispro (humaLOG) injection 0-9 Units  0-9 Units Subcutaneous 4x Daily With Meals & Nightly Mick Francisco APRN   6 Units at 08/12/18 1217   • losartan (COZAAR) tablet 100 mg  100 mg Oral Q24H Mick Francisco APRN   100 mg at 08/12/18 0809   • pantoprazole (PROTONIX) EC tablet 40 mg  40 mg Oral Daily Mick Francisco APRN   40 mg at 08/12/18 0809   • sodium chloride 0.9 % flush 1-10 mL  1-10 mL Intravenous PRN Mick Francisco APRN       • sodium chloride 0.9 % infusion  50 mL/hr Intravenous Continuous Mick Francisco APRN 50 mL/hr at 08/12/18 1217 50 mL/hr at 08/12/18 1217   • traZODone (DESYREL) tablet 150 mg  150 mg Oral Nightly Mick Francisco APRN   150 mg at 08/11/18 2050       Review of Systems:   -A 14 point review of systems is completed and is negative except for left arm and leg weakness, left numb numbness, left facial weakness.      Objective      Vital Signs  Temp:  [97.6 °F (36.4 °C)-98.6 °F (37 °C)] 98.4 °F (36.9 °C)  Heart Rate:  [50-59] 56  Resp:  [16-20] 20  BP: (125-147)/(58-69) 133/69    Physical Exam:    Mental Status: Mildly somnolent, alerts to my direction, oriented X 3, no aphasia, interactive  Cranial nerves: Left facial droop, mild dysarthria, left facial creased sensation  Motor: Increased tone in the left extremities, currently unable to lift either the left arm or the left leg up off  the bed, strength 1/5  Sensory: Diminished over left face/arm/leg  Cerebellar: No ataxia  Gait: deferred for patient safety     Results Review:    I reviewed the patient's new clinical results.      Results from last 7 days  Lab Units 08/12/18  1000 08/11/18  0958   WBC 10*3/mm3 6.11 7.39   HEMOGLOBIN g/dL 13.9* 15.2   HEMATOCRIT % 40.7  40.4 42.5   PLATELETS 10*3/mm3 238  255 300          Results from last 7 days  Lab Units 08/12/18  1000 08/11/18  0958   SODIUM mmol/L 137 139   POTASSIUM mmol/L 4.6 4.1   CHLORIDE mmol/L 102 103   CO2 mmol/L 24.0 22.0*   BUN mg/dL 27* 26*   CREATININE mg/dL 1.29 1.48*   CALCIUM mg/dL 9.0 9.8   BILIRUBIN mg/dL  --  0.6   ALK PHOS U/L  --  44   ALT (SGPT) U/L  --  15   AST (SGOT) U/L  --  28   GLUCOSE mg/dL 297* 109*        Lab Results   Component Value Date    PHOS 3.8 08/11/2018    MG 2.0 08/11/2018    PROTIME 12.6 08/11/2018    INR 0.92 08/11/2018     No components found for: POCGLUC  No components found for: A1C  Lab Results   Component Value Date    HDL 30 (L) 08/12/2018     (H) 08/12/2018     No components found for: B12  Lab Results   Component Value Date    TSH 3.320 08/11/2018       Assessment/Plan     Hospital Problem List    Active Problems:    Cerebrovascular accident (CVA) (CMS/HCC)    Impression   Left face arm and leg hemiparesis and hemisensory deficit  Extension of acute ischemic stroke versus worsening of prior stroke symptoms, possibly due to hyperglycemia  B12 deficiency  Mild Parkinson's disease     Plan  Continue aspirin 81 mg daily, Plavix 75 mg daily, Lipitor 80 mg daily  Goal A1c below 7, primary service input appreciated for treatment of hypoglycemia  Will obtain a stat head CT to rule out bleed or acute process  Continue Permissive hypertension up to /90 for the next 24 hours  Trying to obtain the MRI report from the previous hospitalization at [facility], I asked the  to help with this - unable to do so today, as  medical records office there is closed. I asked his wife to obtain a copy of the report.  PT/OT/ST    Jillian Ortiz MD  08/12/18  12:26 PM        Electronically signed by Jillian Ortiz MD at 8/12/2018  5:08 PM     Mick Francisco APRN at 8/12/2018  7:30 AM     Attestation signed by Alex Martinez DO at 8/12/2018  1:29 PM    I personally evaluated and examined the patient in conjunction with BRENNA Gonzalez and agree with the assessment, treatment plan, and disposition of the patient as recorded by her. My history, exam, and further recommendations are:     Assessment/Plan:  1.  Right lacunar infarct with extension into the right paraventricular region in the right hemisphere              - Appreciate Neurology's assistance              - MRI brain: right basal ganglia stroke with extension into the right paraventricular region               - 2D echocardiogram with bubble study: Negative bubble study, LVEF 65%, diastolic dysfunctioni. Mild AS.              - Bilateral Carotid Duplex: Negative              - PT/OT              - Speech evaluation: soft foods with nectar thick liquids               - Lipid profile:               - Check HghA1c.              - Resume ASA and Plavix.              - Hold Lovenox for DVT prophylaxis.               [place] [facility] [place] last [place].     2.  Hyperlipidemia              - Lipid profile:               - Lipitor 80 mg nightly              - Continue Tricor     3.  Insulin dependent diabetes mellitus, type II              - HgbA1c pending              - Insulin, Levemir 15 units nightly              - Accu checks ac/hs              - Glucoses: 179, 264, 269.     4.  Parkinson's Disease              - Continue Sinemet      5.  Hypertension              - Resume ARB, Losartan substituted for Benicar per formulary               - Hydralazine 25 mg BID     6.  Chronic kidney disease, stage III              -  Decrease NS to 50 ml/hr               - Recheck BMP in AM     7.  Gastroesophageal Reflux disease              - Protonix     8.  Depression              - Resume Trazodone and Lexapro    Alex Martinez DO  08/12/18  1:29 PM                          [facility] Medicine Services  INPATIENT PROGRESS NOTE    Patient Name: Angelika Sapp  Date of Admission: 8/11/2018  Today's Date: 08/12/18  Length of Stay: 1  Primary Care Physician: Sergio Ma MD    Subjective   Chief Complaint: left sided weakness    HPI   Currently sitting up in bed.  Still having left sided weakness/flaccid.  Is able to move hands and feet some but cannot raise off the bed or move either limb around.  He is alert and seems to be oriented at this time.  No family was present during my evaluation.  He denies any complaints.  Discussed MRI results and further testing to be done.     Review of Systems   Constitutional: Positive for activity change and fatigue. Negative for appetite change, chills and fever.   HENT: Negative for hearing loss, nosebleeds, tinnitus and trouble swallowing.    Eyes: Negative for visual disturbance.   Respiratory: Negative for cough, chest tightness, shortness of breath and wheezing.    Cardiovascular: Negative for chest pain, palpitations and leg swelling.   Gastrointestinal: Negative for abdominal distention, abdominal pain, blood in stool, constipation, diarrhea, nausea and vomiting.   Endocrine: Negative for cold intolerance, heat intolerance, polydipsia, polyphagia and polyuria.   Genitourinary: Negative for decreased urine volume, difficulty urinating, dysuria, flank pain, frequency and hematuria.   Musculoskeletal: Negative for arthralgias, joint swelling and myalgias.   Skin: Negative for rash.   Allergic/Immunologic: Negative for immunocompromised state.   Neurological: Positive for weakness (left sided) and light-headedness. Negative for dizziness, syncope and headaches.    Hematological: Negative for adenopathy. Does not bruise/bleed easily.   Psychiatric/Behavioral: Positive for confusion (intermittently ). Negative for sleep disturbance. The patient is not nervous/anxious.       All pertinent negatives and positives are as above. All other systems have been reviewed and are negative unless otherwise stated.     Objective    Temp:  [97.6 °F (36.4 °C)-98.6 °F (37 °C)] 98.4 °F (36.9 °C)  Heart Rate:  [50-59] 56  Resp:  [16-20] 20  BP: (125-147)/(58-69) 133/69     Physical Exam   Constitutional: He is oriented to person, place, and time. He appears well-developed and well-nourished.   Obese. NAD.  Sitting up in bed.     HENT:   Head: Normocephalic and atraumatic.   Eyes: Pupils are equal, round, and reactive to light. Conjunctivae and EOM are normal.   Neck: Neck supple. No JVD present. No thyromegaly present.   Cardiovascular: Regular rhythm, normal heart sounds and intact distal pulses.  Bradycardia present.  Exam reveals no gallop and no friction rub.    No murmur heard.  SB 53-62   Pulmonary/Chest: Effort normal and breath sounds normal. No respiratory distress. He has no wheezes. He has no rales. He exhibits no tenderness.   diminished    Abdominal: Soft. Bowel sounds are normal. He exhibits distension (obese abdomen ). There is no tenderness. There is no rebound and no guarding.   Musculoskeletal: He exhibits no edema, tenderness or deformity.   Lymphadenopathy:     He has no cervical adenopathy.   Neurological: He is alert and oriented to person, place, and time. A sensory deficit is present.   Left upper and lower extremity flaccid    Skin: Skin is warm and dry. No rash noted.   Psychiatric: He has a normal mood and affect. His behavior is normal. Judgment and thought content normal.   Nursing note and vitals reviewed.    Results Review:  I have reviewed the labs, radiology results, and diagnostic studies.    Laboratory Data:     Results from last 7 days  Lab Units  08/12/18  1000 08/11/18  0958   WBC 10*3/mm3 6.11 7.39   HEMOGLOBIN g/dL 13.9* 15.2   HEMATOCRIT % 40.7  40.4 42.5   PLATELETS 10*3/mm3 238  255 300       Results from last 7 days  Lab Units 08/12/18  1000 08/11/18  0958   SODIUM mmol/L 137 139   POTASSIUM mmol/L 4.6 4.1   CHLORIDE mmol/L 102 103   CO2 mmol/L 24.0 22.0*   BUN mg/dL 27* 26*   CREATININE mg/dL 1.29 1.48*   CALCIUM mg/dL 9.0 9.8   BILIRUBIN mg/dL  --  0.6   ALK PHOS U/L  --  44   ALT (SGPT) U/L  --  15   AST (SGOT) U/L  --  28   GLUCOSE mg/dL 297* 109*       Results from last 7 days  Lab Units 08/12/18  1000   CHOLESTEROL mg/dL 167   TRIGLYCERIDES mg/dL 198*   HDL CHOL mg/dL 30*       Results from last 7 days  Lab Units 08/12/18  1000   LDL CHOL mg/dL 101*     Radiology Data:   Imaging Results (last 24 hours)     Procedure Component Value Units Date/Time    MRI Brain Without Contrast [[id number]] Collected:  08/11/18 1537     Updated:  08/11/18 1542    Narrative:       MRI BRAIN WO CONTRAST- 8/11/2018 3:13 PM CDT     HISTORY: Stroke; I63.9-Cerebral infarction, unspecified;  R13.12-Dysphagia, oropharyngeal phase     COMPARISON: None.       TECHNIQUE: Multiplanar imaging of the brain was performed in a routine  fashion without contrast.     FINDINGS:   Diffusion: In the right hemisphere restricted diffusion is noted in the  basal ganglia region extending to the right paraventricular region..     Midline structures: Nondisplaced.     Ventricles: Normal in configuration and symmetric in size.     Masses: No masses or mass effect.     Basilar cisterns: Maintained.     Extra axial space: No abnormal extra-axial fluid.     Gray-white matter signal: Mild changes of aging are noted. There some  slight increased signal in the paraventricular white matter.     Cerebellum: Normal.     Brainstem: Normal.     Other: Proximal cervical spinal cord is normal. Bilateral globes and  orbits are normal in appearance. Normal cerebrovascular flow voids  noted. Mucous cyst  is present right maxillary antrum mastoid air cells  are normally pneumatized.       Impression:       1. Ischemic infarction of right basal ganglia  extending to the right  paraventricular region in the right hemisphere   This report was finalized on 08/11/2018 15:39 by Dr. Cesar Rodriguez MD.    US Carotid Bilateral [[id number]] Updated:  08/11/18 1510          I have reviewed the patient's current medications.     Assessment/Plan     Assessment/Plan:  1.   Right lacunar infarct with extension into the right paraventricular region in the right hemisphere              -  Appreciate Neurology's assistance              - MRI brain: right basal ganglia stroke with extension into the right paraventricular region               - 2D echocardiogram with bubble study: Negative bubble study, LVEF 65%, diastolic dysfunctioni. Mild AS.              - Bilateral Carotid Duplex: Negative              - PT/OT              - Speech evaluation: soft foods with nectar thick liquids               - Lipid profile:               - Check HghA1c.              - Resume ASA and Plavix.              - Hold Lovenox for DVT prophylaxis.    - Obtain records [place] [facility] [place], [place]/MRI scans done during admission last week.     2.  Hyperlipidemia              - Lipid profile:               - Lipitor 80 mg nightly              - Continue Tricor     3.  Insulin dependent diabetes mellitus, type II              - HgbA1c pending              - Insulin, Levemir 15 units nightly              - Accu checks ac/hs              -  Glucoses: 179, 264, 269.     4.  Parkinson's Disease              - Continue Sinemet      5.  Hypertension              - Resume ARB, Losartan substituted for Benicar per formulary               - Hydralazine 25 mg BID     6.  Chronic kidney disease, stage III              -  Decrease NS to 50 ml/hr               - Recheck BMP in AM     7.  Gastroesophageal Reflux disease              - Protonix     8.  Depression          "     - Resume Trazodone and Lexapro    Discharge Planning: I expect the patient to be discharged to [place] in 1-2 days.    BRENNA Trinh   08/12/18   12:05 PM      Electronically signed by Alex Martinez DO at 8/12/2018  1:29 PM          Consult Notes (last 72 hours) (Notes from 8/10/2018 12:50 PM through 8/13/2018 12:50 PM)      Jillian Ortiz MD at 8/11/2018  2:01 PM          Neurology Consult Note    Referring Provider: Dr. Martinez  Reason for Consultation: Stroke      History of present illness:      Patient is an 78-year-old  male with past medical history of Parkinson's disease, hypertension, hyperlipidemia, ischemic stroke 2 years ago with residual left-sided weakness.  Since his stroke he has a fair amount of left leg weakness but is usually able to use his left hand in some capacity.  At baseline he is usually able to ambulate with a cane.  He was admitted to [facility] approximately 2 weeks ago with a worsening of left arm and leg weakness and numbness him a possibly in the setting of a fall, and was diagnosed with a stroke at that time.  I do not have the results of that workup currently.  His wife notes that the \"changed around his medications over there\".  He was discharged yesterday to [facility].  He is currently on aspirin 81 mg daily and Plavix 75 mg daily.  His wife notes that last evening he had difficulty chewing food on the left side in the food was falling out of his mouth.  The patient notes that starting at around midnight he noticed a worsening of his left-sided weakness and numbness initially in his leg but also involving his arm.  It appeared to progress throughout the night.  He was brought in to our ED at about 10 AM after his age had to feed him at 8:30 and found that he had drooling and profound left-sided facial weakness.  Per the report of the nursing home staff, the nurses aide was able to dress him without " difficulty at 7:30 AM.  I was contacted by the ED staff about this patient at 10:12 AM to evaluate him for possible TPA.  At that time the patient had reported that his symptoms had started at around midnight the night before presentation, and was therefore outside any treatment window for TPA.  It is notable that his current symptoms are in fact a worsening of his previous stroke symptoms from approximately 2 weeks ago, with the addition of the left facial weakness, which was apparently less prominent or absent at bedtime.  This is further corroborated by his wife's report of symptoms starting even earlier than that, at dinner yesterday.  Head CT was obtained which showed a chronic right basal ganglia infarct extending into the right periventricular white matter, and was negative for any acute abnormalities.     He denies any recent fevers, sweats, constitutional symptoms, or other changes in his health recently.  Of note a creatinine is elevated at 1.48.  No baseline information is available presently.    Past Medical History:   Diagnosis Date   • Arthritis    • CVA (cerebral vascular accident) (CMS/[facility])    • Depressive disorder    • Diabetes mellitus (CMS/[facility])    • GERD (gastroesophageal reflux disease)    • Hemiplegia (CMS/[facility])    • Hyperlipidemia    • Hypertension    • Parkinson's disease (CMS/[facility])    • Renal disorder        No Known Allergies  No current facility-administered medications on file prior to encounter.      No current outpatient prescriptions on file prior to encounter.       Social History     Social History   • Marital status:      Spouse name: N/A   • Number of children: N/A   • Years of education: N/A     Occupational History   • Not on file.     Social History Main Topics   • Smoking status: Never Smoker   • Smokeless tobacco: Former User     Types: Chew   • Alcohol use No   • Drug use: No   • Sexual activity: Not on file     Other Topics Concern   • Not on file     Social History  Narrative   • No narrative on file     Family History   Problem Relation Age of Onset   • Alzheimer's disease Mother    • Alcohol abuse Father        Review of Systems  A 14 point review of systems was reviewed and was negative except for left sided weakness, difficulty swallowing, left-sided numbness.    Vital Signs   Temp:  [97.1 °F (36.2 °C)-97.6 °F (36.4 °C)] 97.6 °F (36.4 °C)  Heart Rate:  [48-54] 50  Resp:  [19] 19  BP: (107-137)/(52-89) 129/68    General Exam:  Head:  Normal cephalic, atraumatic  HEENT:  Neck supple  Fundoscopic Exam:  No signs of disc edema  CVS:  Regular rate and rhythm.  No murmurs  Carotid Examination:  No bruits  Lungs:  Clear to auscultation  Abdomen:  Non-tender, Non-distended  Extremities:  No signs of peripheral edema  Skin:  No rashes    Neurologic Exam:    Mental Status:    -Awake, Alert, Oriented X 3  -No word finding difficulties  -No aphasia  -Mild dysarthria  -Follows simple and complex commands    CN II:  Visual fields full.  Pupils equally reactive to light  CN III, IV, VI:  Extraocular Muscles full with no signs of nystagmus  CN V:  Decreased sensation over the lower left face   CN VII:  Right lower left facial droop  CN VIII:  Gross hearing intact bilaterally  CN IX:  Palate elevates symmetrically  CN X:  Palate elevates symmetrically  CN XI:  Shoulder shrug symmetric  CN XII:  Tongue is midline on protrusion    Motor: (strength out of 5:  1= minimal movement, 2 = movement in plane of gravity, 3 = movement against gravity, 4 = movement against some resistance, 5 = full strength)  Tone is increased in the left extremities with spastic component.  A coarse resting tremor noted in the right arm.  The left arm is held in a flexor type posture.  Left leg is extended.  He is able to lift his left arm off the bed with difficulty.  A left pronator drift is present.  He has difficulty lifting his left leg off the bed.    -Right Upper Ext: Proximal: 5 Distal: 5  -Left Upper Ext:  Proximal: 3 Distal: 3    -Right Lower Ext: Proximal: 5 Distal: 5  -Left Lower Ext: Proximal: 2 Distal: 3    DTR:  -Right   Bicep: 1+ Tricep: 1+ Brachoradialis: 1+   Patella: 1+ Ankle: 1+ Neg Babinski  -Left   Bicep: 2+ Tricep: 2+ Brachoradialis: 2+   Patella: 2+ Ankle: 2+ POS Babinski    Sensory:  -Diminished to light touch, pinprick, over the left face, arm, and leg.     Coordination:  -Finger to nose intact on the right, unable to perform this maneuver on the left  -Heel to shin intact on the right, unable to perform this maneuver on the left  -No ataxia    Gait  Deferred      Results Review:  Lab Results (last 24 hours)     Procedure Component Value Units Date/Time    TSH [[id number]]  (Normal) Collected:  08/11/18 0958    Specimen:  Blood from Arm, Left Updated:  08/11/18 1344     TSH 3.320 mIU/mL     [place] Draw [[id number]] Collected:  08/11/18 0958    Specimen:  Blood Updated:  08/11/18 1100    Narrative:       The following orders were created for panel order [place] Draw.  Procedure                               Abnormality         Status                     ---------                               -----------         ------                     Light Blue Top[[id number]]                                   Final result               Green Top (Gel)[[id number]]                                  Final result               Lavender Top[[id number]]                                     Final result               Red Top[[id number]]                                          Final result                 Please view results for these tests on the individual orders.    Light Blue Top [[id number]] Collected:  08/11/18 0958    Specimen:  Blood from Arm, Left Updated:  08/11/18 1100     Extra Tube hold for add-on     Comment: Auto resulted       Green Top (Gel) [[id number]] Collected:  08/11/18 0958    Specimen:  Blood from Arm, Left Updated:  08/11/18 1100     Extra Tube Hold for add-ons.     Comment: Auto resulted.       Lavender  Top [[id number]] Collected:  08/11/18 0958    Specimen:  Blood from Arm, Left Updated:  08/11/18 1100     Extra Tube hold for add-on     Comment: Auto resulted       Red Top [[id number]] Collected:  08/11/18 0958    Specimen:  Blood from Arm, Left Updated:  08/11/18 1100     Extra Tube Hold for add-ons.     Comment: Auto resulted.       aPTT [[id number]]  (Normal) Collected:  08/11/18 0958    Specimen:  Blood from Arm, Left Updated:  08/11/18 1048     PTT 27.9 seconds     Protime-INR [[id number]]  (Normal) Collected:  08/11/18 0958    Specimen:  Blood from Arm, Left Updated:  08/11/18 1048     Protime 12.6 Seconds      INR 0.92    Troponin [[id number]]  (Normal) Collected:  08/11/18 0958    Specimen:  Blood from Arm, Left Updated:  08/11/18 1035     Troponin I 0.015 ng/mL     Comprehensive Metabolic Panel [[id number]]  (Abnormal) Collected:  08/11/18 0958    Specimen:  Blood from Arm, Left Updated:  08/11/18 1025     Glucose 109 (H) mg/dL      BUN 26 (H) mg/dL      Creatinine 1.48 (H) mg/dL      Sodium 139 mmol/L      Potassium 4.1 mmol/L      Chloride 103 mmol/L      CO2 22.0 (L) mmol/L      Calcium 9.8 mg/dL      Total Protein 7.5 g/dL      Albumin 4.50 g/dL      ALT (SGPT) 15 U/L      AST (SGOT) 28 U/L      Alkaline Phosphatase 44 U/L      Total Bilirubin 0.6 mg/dL      eGFR Non African Amer 46 (L) mL/min/1.73      Globulin 3.0 gm/dL      A/G Ratio 1.5 g/dL      BUN/Creatinine Ratio 17.6     Anion Gap 14.0 (H) mmol/L     Narrative:       The MDRD GFR formula is only valid for adults with stable renal function between ages 18 and 70.    CBC & Differential [[id number]] Collected:  08/11/18 0958    Specimen:  Blood Updated:  08/11/18 1012    Narrative:       The following orders were created for panel order CBC & Differential.  Procedure                               Abnormality         Status                     ---------                               -----------         ------                     CBC Auto  Differential[[id number]]        Abnormal            Final result                 Please view results for these tests on the individual orders.    CBC Auto Differential [[id number]]  (Abnormal) Collected:  08/11/18 0958    Specimen:  Blood from Arm, Left Updated:  08/11/18 1012     WBC 7.39 10*3/mm3      RBC 4.59 (L) 10*6/mm3      Hemoglobin 15.2 g/dL      Hematocrit 42.5 %      MCV 92.6 fL      MCH 33.1 (H) pg      MCHC 35.8 g/dL      RDW 12.9 %      RDW-SD 43.7 fl      MPV 10.1 fL      Platelets 300 10*3/mm3      Neutrophil % 60.7 %      Lymphocyte % 24.0 %      Monocyte % 12.3 (H) %      Eosinophil % 1.8 %      Basophil % 0.7 %      Immature Grans % 0.5 %      Neutrophils, Absolute 4.49 10*3/mm3      Lymphocytes, Absolute 1.77 10*3/mm3      Monocytes, Absolute 0.91 10*3/mm3      Eosinophils, Absolute 0.13 10*3/mm3      Basophils, Absolute 0.05 10*3/mm3      Immature Grans, Absolute 0.04 (H) 10*3/mm3      nRBC 0.0 /100 WBC     POC Glucose Once [[id number]]  (Normal) Collected:  08/11/18 0947    Specimen:  Blood Updated:  08/11/18 1006     Glucose 130 mg/dL      Comment: : [id number] Stanley Sanchez LMeter ID: [id number]             .  Imaging Results (last 24 hours)     Procedure Component Value Units Date/Time    CT Head Without Contrast [[id number]] Collected:  08/11/18 1044     Updated:  08/11/18 1048    Narrative:       EXAMINATION:   CT HEAD WO CONTRAST-  8/11/2018 10:44 AM CDT     CT BRAIN without contrast 8/11/2018 10:25 AM CDT     HISTORY: Weakness     COMPARISON: None      DLP: 661 mGy cm     TECHNIQUE: Serial axial tomographic images of the brain were obtained  without the use of intravenous contrast.      FINDINGS:   The midline structures are nondisplaced. There is moderate cerebral and  cerebellar volume loss, with an associated increase in the prominence of  the ventricles and sulci. The basilar cisterns are normal in size and  configuration. There is no evidence of intracranial hemorrhage  or  mass-effect. There is low attenuation in the periventricular white  matter, consistent with chronic ischemic change. There are no abnormal  extra-axial fluid collections. There is no evidence of tonsillar  herniation.      The included orbits and their contents are unremarkable. The visualized  paranasal sinuses, mastoid air cells and middle ear cavities are clear.  The visualized osseous structures and overlying soft tissues of the  skull and face are intact.        Impression:       Changes of aging with no acute intracranial abnormality  This report was finalized on 08/11/2018 10:45 by Dr. Cesar Rodriguez MD.    XR Chest 1 View [[id number]] Collected:  08/11/18 1020     Updated:  08/11/18 1023    Narrative:       EXAMINATION:   XR CHEST 1 VW-  8/11/2018 10:20 AM CDT     HISTORY: Left-sided weakness     Frontal upright radiograph of the chest 8/11/2018 10:18 AM CDT     COMPARISON: None.     FINDINGS:   The lungs are clear. The cardiomediastinal silhouette and pulmonary  vascularity are within normal limits.      The osseous structures and surrounding soft tissues demonstrate no acute  abnormality.       Impression:       1. No radiographic evidence of acute cardiopulmonary process.        This report was finalized on 08/11/2018 10:20 by Dr. Cesar Rodriguez MD.              Impression    Left face arm and leg hemiparesis and hemisensory deficit  Rule out acute ischemic stroke versus worsening of prior stroke symptoms  Mild Parkinson's disease    Plan  Continue aspirin 81 mg daily, Plavix 75 mg daily, Lipitor 80 mg daily  MRI brain without contrast  Carotid ultrasound  Transthoracic echocardiogram  Fasting lipid panel, A1c  Will check urinalysis rule out UTI, rule out other metabolic abnormalities  Holding hydralazine, Permissive hypertension up to /90 for the next 24-48 hours  Try to obtain records from the recent hospitalization at [facility]  DVT prophylaxis - SCDs  PT/OT/ST - swallow  [place]    I discussed the patients findings and my recommendations with patient and family    Jillian Ortiz MD  08/11/18  2:01 PM        Electronically signed by Jillian Ortiz MD at 8/11/2018  2:47 PM

## 2018-08-13 NOTE — PLAN OF CARE
Problem: Patient Care Overview  Goal: Plan of Care Review  Outcome: Ongoing (interventions implemented as appropriate)   08/13/18 2589   Coping/Psychosocial   Plan of Care Reviewed With patient   Plan of Care Review   Progress no change   OTHER   Outcome Summary Pt had breakfast tray in front of him upon SLP arrival. He was holding his fork, but hadn't began eating as he was reclined back in a poor position to feed himself. SLP assisted with sitting pt's bed upright and positioning his plate, drink, and utensils so that he could more easily access them. He completed trials of mechanical soft solids with no overt s/s of aspiration. Educated pt on previous recommendation for nectar thick liquids. Pt's upper dentures were out, but he did not want to put them in and appeared to have no difficulty chewing his food without them. SLP will return after pt's meal to complete swallow exercises.

## 2018-08-13 NOTE — PROGRESS NOTES
Continued Stay Note  [facility]     Patient Name: Angelika Sapp  MRN: [id number]  Today's Date: 8/13/2018    Admit Date: 8/11/2018          Discharge Plan     Row Name 08/13/18 1516       Plan    Plan [place]    Patient/Family in Agreement with Plan yes    Plan Comments Patient has been accepted to [facility] and can discharge to [place] tomorrow, 8/14.  notified BRENNA Barton. WIll follow for discharge.            SWATI NewW

## 2018-08-13 NOTE — THERAPY TREATMENT NOTE
Acute Care - Occupational Therapy Treatment Note  [facility]     Patient Name: Angelika Sapp  : 1940  MRN: [id number]  Today's Date: 2018  Onset of Illness/Injury or Date of Surgery: (P) 18  Date of Referral to OT: 18  Referring Physician: Mick Francisco (P)    Admit Date: 2018       ICD-10-CM ICD-9-CM   1. Cerebrovascular accident (CVA), unspecified mechanism (CMS/HCC) I63.9 434.91   2. Oropharyngeal dysphagia R13.12 787.22   3. Impaired mobility and ADLs Z74.09 799.89     Patient Active Problem List   Diagnosis   • Cerebrovascular accident (CVA) (CMS/HCC)     Past Medical History:   Diagnosis Date   • Arthritis    • CVA (cerebral vascular accident) (CMS/HCC)    • Depressive disorder    • Diabetes mellitus (CMS/HCC)    • GERD (gastroesophageal reflux disease)    • Hemiplegia (CMS/HCC)    • Hyperlipidemia    • Hypertension    • Parkinson's disease (CMS/HCC)    • Renal disorder      History reviewed. No pertinent surgical history.    Therapy Treatment          Rehabilitation Treatment Summary     Row Name 18 1001 18 0836          Treatment Time/Intention    Discipline occupational therapy assistant  -TS speech language pathologist  -MB     Document Type therapy note (daily note)  -TS therapy note (daily note)  -MB     Subjective Information complains of;weakness;fatigue  -TS2 no complaints  -MB     Mode of Treatment  -- speech-language pathology  -MB     Patient Effort good  -TS2 good  -MB     Existing Precautions/Restrictions fall   LUE flaccid, LLE weak  -TS2  --     Treatment Considerations/Comments 1 inch subluxation L shoulder   -TS2  --     Recorded by [TS] Marilin Zimmer COTA/L 18 1001  [TS2] Marilin Zimmer FIGUEREDO/ENRIQUE 18 1126 [MB] Dariela Benitez CCC-SLP 18 0848     Row Name 18 1001             Cognitive Assessment/Intervention- PT/OT    Personal Safety Interventions fall prevention program maintained;gait belt;nonskid shoes/slippers  when out of bed;elopement precautions initiated  -TS      Recorded by [TS] Marilin Zimmer COTA/L 08/13/18 1126      Row Name 08/13/18 1001             Bed Mobility Assessment/Treatment    Bed Mobility Assessment/Treatment supine-sit;sit-supine;rolling right;rolling left;scooting/bridging  -TS      Rolling Left [place] (Bed Mobility) moderate assist (50% patient effort)  -TS      Rolling Right [place] (Bed Mobility) moderate assist (50% patient effort)  -TS      Scooting/Bridging [place] (Bed Mobility) maximum assist (25% patient effort)  -TS      Supine-Sit [place] (Bed Mobility) moderate assist (50% patient effort);maximum assist (25% patient effort)  -TS      Sit-Supine [place] (Bed Mobility) moderate assist (50% patient effort)  -TS      Bed Mobility, Safety Issues decreased use of arms for pushing/pulling;decreased use of legs for bridging/pushing  -TS      Assistive Device (Bed Mobility) bed rails;draw sheet;head of bed elevated  -TS      Recorded by [TS] Marilin Zimmer COTA/L 08/13/18 1126      Row Name 08/13/18 1001             ADL Assessment/Intervention    35180 - OT Self Care/Mgmt Minutes 54  -TS      BADL Assessment/Intervention lower body dressing  -TS      Recorded by [TS] Marilin Zimmer COTA/L 08/13/18 1126      Row Name 08/13/18 1001             Lower Body Dressing Assessment/Training    Lower Body Dressing [place] Level don;doff;undergarment;maximum assist (25% patient effort)  -TS      Lower Body Dressing Position supine  -TS      Recorded by [TS] Marilin Zimmer COTA/L 08/13/18 1126      Row Name 08/13/18 1001             General ROM    GENERAL ROM COMMENTS pt was able to initiate grasp with digits of L hand, PROM completed in all other fields  -TS      Recorded by [TS] Marilin Zimmer COTA/L 08/13/18 1144      Row Name 08/13/18 1001             Motor Skills Assessment/Interventions    Additional Documentation Muscle Tone Assessment  (Row)  -TS      Recorded by [TS] Marilin Zimmer COTA/L 08/13/18 1144      Row Name 08/13/18 1001             Balance    Balance static sitting balance  -TS      Recorded by [TS] Marilin Zimmer COTA/L 08/13/18 1144      Row Name 08/13/18 1001             Static Sitting Balance    Level of [place] (Unsupported Sitting, Static Balance) contact guard assist;minimal assist, 75% patient effort;moderate assist, 50 to 74% patient effort  -TS      Sitting Position (Unsupported Sitting, Static Balance) sitting on edge of bed  -TS      Time Able to Maintain Position (Unsupported Sitting, Static Balance) 2 to 3 minutes  -TS      Comment (Unsupported Sitting, Static Balance) verbal cues to correct anterior lean to L side   -TS      Recorded by [TS] Marliin Zimmer COTA/L 08/13/18 1144      Row Name 08/13/18 1001             Positioning and Restraints    Pre-Treatment Position in bed  -TS      Post Treatment Position bed  -TS      In Bed side lying left;call light within reach;encouraged to call for assist;exit alarm on;side rails up x2;with family/caregiver;SCD pump applied  -TS      Recorded by [TS] Marilin Zimmer COTA/L 08/13/18 1126      Row Name 08/13/18 0836             Pain Scale: FACES Pre/Post-Treatment    Pain: FACES Scale, Pretreatment 0-->no hurt  -MB      Recorded by [MB] Dariela Benitez CCC-SLP 08/13/18 0848      Row Name 08/13/18 1001             Outcome Summary/Treatment Plan (OT)    Daily Summary of Progress (OT) progress toward functional goals is good  -TS      Anticipated Discharge Disposition (OT) skilled nursing facility  -TS      Recorded by [TS] Marilin Zimmer COTA/L 08/13/18 1126      Row Name 08/13/18 0836             Outcome Summary/Treatment Plan (SLP)    Daily Summary of Progress (SLP) progress toward functional goals as expected  -MB      Recorded by [MB] Dariela Benitez CCC-SLP 08/13/18 0848        User Key  (r) = Recorded By, (t) = Taken By, (c) =  Cosigned By    Initials Name Effective Dates Discipline    MB Dariela Benitez CCC-SLP 08/02/16 -  SLP    Marilin Hall COTA/L 08/02/16 -  OT               Occupational Therapy Education     Title: PT OT SLP Therapies (Active)     Topic: Occupational Therapy (Active)     Point: ADL training (Done)     Description: Instruct learner(s) on proper safety adaptation and remediation techniques during self care or transfers.   Instruct in proper use of assistive devices.   Learning Progress Summary     Learner Status Readiness Method Response Comment Documented by    Patient Done Acceptance E,D VU,NR SROM, PROM to LUE, positioning of LUE, progression of POC , sit balance TS 08/13/18 1126     Active Acceptance E NR OT role, benefits, POC, supporting LUE  08/12/18 1609    Family Done Acceptance E,D VU,NR SROM, PROM to LUE, positioning of LUE, progression of POC , sit balance TS 08/13/18 1126          Point: Precautions (Done)     Description: Instruct learner(s) on prescribed precautions during self-care and functional transfers.   Learning Progress Summary     Learner Status Readiness Method Response Comment Documented by    Patient Done Acceptance E,D VU,NR SROM, PROM to LUE, positioning of LUE, progression of POC , sit balance TS 08/13/18 1126    Family Done Acceptance E,D VU,NR SROM, PROM to LUE, positioning of LUE, progression of POC , sit balance TS 08/13/18 1126                      User Key     Initials Effective Dates Name Provider Type Discipline     08/02/16 -  Marilin Zimmer FIGUEREDO/L Occupational Therapy Assistant OT     04/03/18 -  Obie Negrete OTR/L Occupational Therapist OT                OT Recommendation and Plan  Outcome Summary/Treatment Plan (OT)  Daily Summary of Progress (OT): progress toward functional goals is good  Anticipated Discharge Disposition (OT): skilled nursing facility  Daily Summary of Progress (OT): progress toward functional goals is good  Plan of Care  Review  Plan of Care Reviewed With: patient  Plan of Care Reviewed With: patient  Outcome Summary: Pt min/mod A for all bed mobility. Pt and daughter educated on SROM/PROM for LUE along with positioning and protection of LUE.. 1 finger width of subluxation both noted at L shoulder while seated EOB. Pt CGA/mod A with verbal cues for sit balance EOB. Pt would benefit from SNF for continued rehab prior to returning home. Continue OT POC         Outcome Measures     Row Name 08/13/18 1100 08/12/18 1600          How much help from another is currently needed...    Putting on and taking off regular lower body clothing? 2  -TS 1  -MM     Bathing (including washing, rinsing, and drying) 2  -TS 1  -MM     Toileting (which includes using toilet bed pan or urinal) 2  -TS 1  -MM     Putting on and taking off regular upper body clothing 2  -TS 2  -MM     Taking care of personal grooming (such as brushing teeth) 3  -TS 2  -MM     Eating meals 3  -TS 2  -MM     Score 14  -TS 9  -MM        Modified [place] Scale    Modified [place] Scale  -- 4 - Moderately severe disability.  Unable to walk without assistance, and unable to attend to own bodily needs without assistance.  -MM        Functional Assessment    Outcome Measure Options AM-PAC 6 Clicks Daily Activity (OT)  -TS AM-PAC 6 Clicks Daily Activity (OT);Modified [place]  -MM       User Key  (r) = Recorded By, (t) = Taken By, (c) = Cosigned By    Initials Name Provider Type    TS Marilin Zimmer, FIGUEREDO/L Occupational Therapy Assistant    MM Obie Negrete, OTR/L Occupational Therapist           Time Calculation:         Time Calculation- OT     Row Name 08/13/18 1148 08/13/18 1001          Time Calculation- OT    OT Start Time 1001  -TS  --     OT Stop Time 1055  -TS  --     OT Time Calculation (min) 54 min  -TS  --     Total Timed Code Minutes- OT 54 minute(s)  -TS  --     OT Received On 08/13/18  -TS  --        Timed Charges    53701 - OT Self Care/Mgmt Minutes  -- 54  -TS        User Key  (r) = Recorded By, (t) = Taken By, (c) = Cosigned By    Initials Name Provider Type    TS Marilin Zimmer COTA/L Occupational Therapy Assistant           Therapy Suggested Charges     Code   Minutes Charges    29223 (CPT®) Hc Ot Neuromusc Re Education Ea 15 Min      40696 (CPT®) Hc Ot Ther Proc Ea 15 Min      93044 (CPT®) Hc Ot Therapeutic Act Ea 15 Min      63806 (CPT®) Hc Ot Manual Therapy Ea 15 Min      13736 (CPT®) Hc Ot Iontophoresis Ea 15 Min      28072 (CPT®) Hc Ot Elec Stim Ea-Per 15 Min      80444 (CPT®) Hc Ot Ultrasound Ea 15 Min      57224 (CPT®) Hc Ot Self Care/Mgmt/Train Ea 15 Min 54 4    Total  54 4        Therapy Charges for Today     Code Description Service Date Service Provider Modifiers Qty    [id number] HC OT SELF CARE/MGMT/TRAIN EA 15 MIN 8/13/2018 Marilin Zimmer COTA/L GO, KX 4          OT G-codes  OT Professional Judgement Used?: Yes  OT Functional Scales Options: AM-PAC 6 Clicks Daily Activity (OT)  Score: 9/4  Functional Limitation: Self care  Self Care Current Status (): At least 60 percent but less than 80 percent impaired, limited or restricted  Self Care Goal Status (): At least 40 percent but less than 60 percent impaired, limited or restricted    SP Johnson  8/13/2018

## 2018-08-13 NOTE — PLAN OF CARE
Problem: Patient Care Overview  Goal: Plan of Care Review  Outcome: Ongoing (interventions implemented as appropriate)   08/13/18 0105   Coping/Psychosocial   Plan of Care Reviewed With patient;spouse   Plan of Care Review   Progress improving   OTHER   Outcome Summary Pt participated with swallow therapy with good effort. He completed 5 reps of cold bolus stim, effortful, and imani exercises. He had delays of 3 to 12 seconds with cold bolus stimulation. He completed multiple trials of thin water during therapy with no overt s/s of aspiration observed. Ok to upgrade pt to thin liquids, continue mechanical soft solids.

## 2018-08-13 NOTE — PLAN OF CARE
Problem: Fall Risk (Adult)  Goal: Absence of Fall  Outcome: Ongoing (interventions implemented as appropriate)      Problem: Skin Injury Risk (Adult)  Goal: Skin Health and Integrity  Outcome: Ongoing (interventions implemented as appropriate)      Problem: Patient Care Overview  Goal: Plan of Care Review  Outcome: Ongoing (interventions implemented as appropriate)   08/13/18 1504   Coping/Psychosocial   Plan of Care Reviewed With patient;family   Plan of Care Review   Progress no change   OTHER   Outcome Summary Remains with left facial droop, and left side paralysis, left foot has positive reflexes, but no spontanous movements left side. VSS, no c/o pain.     Goal: Individualization and Mutuality  Outcome: Ongoing (interventions implemented as appropriate)    Goal: Discharge Needs Assessment  Outcome: Ongoing (interventions implemented as appropriate)    Goal: Interprofessional Rounds/Family Conf  Outcome: Ongoing (interventions implemented as appropriate)      Problem: Stroke (Ischemic) (Adult)  Goal: Signs and Symptoms of Listed Potential Problems Will be Absent, Minimized or Managed (Stroke)  Outcome: Ongoing (interventions implemented as appropriate)

## 2018-08-14 VITALS
BODY MASS INDEX: 28.44 KG/M2 | RESPIRATION RATE: 18 BRPM | DIASTOLIC BLOOD PRESSURE: 78 MMHG | WEIGHT: 210 LBS | HEART RATE: 64 BPM | OXYGEN SATURATION: 96 % | SYSTOLIC BLOOD PRESSURE: 162 MMHG | TEMPERATURE: 97.8 F | HEIGHT: 72 IN

## 2018-08-14 LAB
ANION GAP SERPL CALCULATED.3IONS-SCNC: 10 MMOL/L (ref 4–13)
BUN BLD-MCNC: 20 MG/DL (ref 5–21)
BUN/CREAT SERPL: 20.2 (ref 7–25)
CALCIUM SPEC-SCNC: 8.7 MG/DL (ref 8.4–10.4)
CHLORIDE SERPL-SCNC: 106 MMOL/L (ref 98–110)
CO2 SERPL-SCNC: 22 MMOL/L (ref 24–31)
CREAT BLD-MCNC: 0.99 MG/DL (ref 0.5–1.4)
DEPRECATED RDW RBC AUTO: 43.6 FL (ref 40–54)
ERYTHROCYTE [DISTWIDTH] IN BLOOD BY AUTOMATED COUNT: 12.6 % (ref 12–15)
GFR SERPL CREATININE-BSD FRML MDRD: 73 ML/MIN/1.73
GLUCOSE BLD-MCNC: 200 MG/DL (ref 70–100)
GLUCOSE BLDC GLUCOMTR-MCNC: 191 MG/DL (ref 70–130)
GLUCOSE BLDC GLUCOMTR-MCNC: 217 MG/DL (ref 70–130)
GLUCOSE BLDC GLUCOMTR-MCNC: 294 MG/DL (ref 70–130)
HCT VFR BLD AUTO: 37.6 % (ref 40–52)
HGB BLD-MCNC: 12.9 G/DL (ref 14–18)
MCH RBC QN AUTO: 32.4 PG (ref 28–32)
MCHC RBC AUTO-ENTMCNC: 34.3 G/DL (ref 33–36)
MCV RBC AUTO: 94.5 FL (ref 82–95)
PLATELET # BLD AUTO: 217 10*3/MM3 (ref 130–400)
PMV BLD AUTO: 10.3 FL (ref 6–12)
POTASSIUM BLD-SCNC: 4.4 MMOL/L (ref 3.5–5.3)
RBC # BLD AUTO: 3.98 10*6/MM3 (ref 4.8–5.9)
SODIUM BLD-SCNC: 138 MMOL/L (ref 135–145)
WBC NRBC COR # BLD: 6.27 10*3/MM3 (ref 4.8–10.8)

## 2018-08-14 PROCEDURE — 92526 ORAL FUNCTION THERAPY: CPT

## 2018-08-14 PROCEDURE — 85027 COMPLETE CBC AUTOMATED: CPT | Performed by: NURSE PRACTITIONER

## 2018-08-14 PROCEDURE — 63710000001 INSULIN LISPRO (HUMAN) PER 5 UNITS: Performed by: NURSE PRACTITIONER

## 2018-08-14 PROCEDURE — 80048 BASIC METABOLIC PNL TOTAL CA: CPT | Performed by: NURSE PRACTITIONER

## 2018-08-14 PROCEDURE — 25010000002 CYANOCOBALAMIN PER 1000 MCG: Performed by: INTERNAL MEDICINE

## 2018-08-14 PROCEDURE — 82962 GLUCOSE BLOOD TEST: CPT

## 2018-08-14 RX ORDER — ATORVASTATIN CALCIUM 40 MG/1
40 TABLET, FILM COATED ORAL NIGHTLY
Start: 2018-08-14 | End: 2018-08-14

## 2018-08-14 RX ORDER — HYDRALAZINE HYDROCHLORIDE 25 MG/1
25 TABLET, FILM COATED ORAL 2 TIMES DAILY
Status: DISCONTINUED | OUTPATIENT
Start: 2018-08-14 | End: 2018-08-14 | Stop reason: HOSPADM

## 2018-08-14 RX ORDER — ATORVASTATIN CALCIUM 80 MG/1
80 TABLET, FILM COATED ORAL NIGHTLY
Start: 2018-08-14

## 2018-08-14 RX ORDER — CYANOCOBALAMIN 1000 UG/ML
1000 INJECTION, SOLUTION INTRAMUSCULAR; SUBCUTANEOUS DAILY
Qty: 4 ML | Refills: 0
Start: 2018-08-14 | End: 2018-08-18

## 2018-08-14 RX ORDER — BISACODYL 10 MG
10 SUPPOSITORY, RECTAL RECTAL DAILY PRN
Status: DISCONTINUED | OUTPATIENT
Start: 2018-08-14 | End: 2018-08-14 | Stop reason: HOSPADM

## 2018-08-14 RX ADMIN — PANTOPRAZOLE SODIUM 40 MG: 40 TABLET, DELAYED RELEASE ORAL at 09:30

## 2018-08-14 RX ADMIN — BISACODYL 10 MG: 10 SUPPOSITORY RECTAL at 10:50

## 2018-08-14 RX ADMIN — INSULIN LISPRO 8 UNITS: 100 INJECTION, SOLUTION INTRAVENOUS; SUBCUTANEOUS at 12:04

## 2018-08-14 RX ADMIN — ASPIRIN 81 MG 81 MG: 81 TABLET ORAL at 09:10

## 2018-08-14 RX ADMIN — LOSARTAN POTASSIUM 100 MG: 50 TABLET, FILM COATED ORAL at 09:10

## 2018-08-14 RX ADMIN — INSULIN LISPRO 2 UNITS: 100 INJECTION, SOLUTION INTRAVENOUS; SUBCUTANEOUS at 09:25

## 2018-08-14 RX ADMIN — FENOFIBRATE 145 MG: 145 TABLET ORAL at 09:09

## 2018-08-14 RX ADMIN — CARBIDOPA AND LEVODOPA 2 TABLET: 25; 100 TABLET ORAL at 15:11

## 2018-08-14 RX ADMIN — CARBIDOPA AND LEVODOPA 2 TABLET: 25; 100 TABLET ORAL at 09:10

## 2018-08-14 RX ADMIN — CLOPIDOGREL 75 MG: 75 TABLET, FILM COATED ORAL at 09:10

## 2018-08-14 RX ADMIN — CYANOCOBALAMIN 1000 MCG: 1000 INJECTION, SOLUTION INTRAMUSCULAR at 09:11

## 2018-08-14 RX ADMIN — HYDRALAZINE HYDROCHLORIDE 25 MG: 25 TABLET, FILM COATED ORAL at 09:30

## 2018-08-14 RX ADMIN — ESCITALOPRAM 20 MG: 10 TABLET, FILM COATED ORAL at 09:10

## 2018-08-14 NOTE — PLAN OF CARE
Problem: Fall Risk (Adult)  Goal: Absence of Fall  Outcome: Ongoing (interventions implemented as appropriate)      Problem: Skin Injury Risk (Adult)  Goal: Skin Health and Integrity  Outcome: Ongoing (interventions implemented as appropriate)      Problem: Patient Care Overview  Goal: Plan of Care Review  Outcome: Ongoing (interventions implemented as appropriate)   08/14/18 0336   Coping/Psychosocial   Plan of Care Reviewed With patient   Plan of Care Review   Progress no change   OTHER   Outcome Summary No change. Back to [facility] today. Pleasant. Left side weakness and facial drooping cont. Incont. per brief. Changed as needed and turned every 2 hours.        Problem: Stroke (Ischemic) (Adult)  Goal: Signs and Symptoms of Listed Potential Problems Will be Absent, Minimized or Managed (Stroke)  Outcome: Ongoing (interventions implemented as appropriate)

## 2018-08-14 NOTE — PLAN OF CARE
Problem: Patient Care Overview  Goal: Plan of Care Review  Outcome: Ongoing (interventions implemented as appropriate)   08/14/18 1020   Coping/Psychosocial   Plan of Care Reviewed With patient;spouse   Plan of Care Review   Progress no change   OTHER   Outcome Summary Pt was upgrade to thin liquids yesterday, but received thickened liquids on his breakfast tray today. He completed trials of thin with no overt s/s of aspiration. SLP notified dietary that the pt should receive thin liquids. Pt discharging to SNF today. Continue mechanical soft solids.

## 2018-08-14 NOTE — THERAPY TREATMENT NOTE
Acute Care - Speech Language Pathology   Swallow Treatment Note [facility]     Patient Name: Angelika Sapp  : 1940  MRN: [id number]  Today's Date: 2018  Onset of Illness/Injury or Date of Surgery: 18     Referring Physician: Mick Francisco      Admit Date: 2018  Pt was upgrade to thin liquids yesterday, but received thickened liquids on his breakfast tray today. He completed trials of thin with no overt s/s of aspiration. SLP notified dietary that the pt should receive thin liquids. Pt discharging to SNF today. Continue mechanical soft solids.   KAYLI Vela 2018 10:22 AM    Visit Dx:      ICD-10-CM ICD-9-CM   1. Cerebrovascular accident (CVA), unspecified mechanism (CMS/HCC) I63.9 434.91   2. Oropharyngeal dysphagia R13.12 787.22   3. Impaired mobility and ADLs Z74.09 799.89   4. Impaired mobility Z74.09 799.89     Patient Active Problem List   Diagnosis   • Cerebrovascular accident (CVA) (CMS/[facility])       Therapy Treatment    Therapy Treatment / Health Promotion    Treatment Time/Intention  Discipline: speech language pathologist (18 1010 : Dariela Benitez CCC-SLP)  Document Type: therapy note (daily note) (18 1010 : Dariela Benitez CCC-SLP)  Subjective Information: no complaints (18 1010 : Dariela Benitez CCC-SLP)  Mode of Treatment: speech-language pathology (18 1010 : Dariela Benitez CCC-SLP)  Patient Effort: good (18 1010 : Dariela Benitez CCC-SLP)  Plan of Care Review  Plan of Care Reviewed With: patient, spouse (18 1020 : Benitez, Marilla B, CCC-SLP)    Vitals/Pain/Safety  Pain Scale: FACES Pre/Post-Treatment  Pain: FACES Scale, Pretreatment: 0-->no hurt (18 1010 : Dariela Benitez CCC-SLP)    Cognition, Communication, Swallow  Recommendations  Anticipated Dischage Disposition: skilled nursing facility (08/14/18 1010 : Dariela Benitez, CCC-SLP)    Outcome Summary  Outcome Summary/Treatment Plan  (SLP)  Daily Summary of Progress (SLP): progress toward functional goals as expected (08/14/18 1010 : Dariela Benitez, CCC-SLP)  Barriers to Overall Progress (SLP): none (08/14/18 1010 : Dariela Benitez, CCC-SLP)  Plan for Continued Treatment (SLP): Continue mechanical soft solids and thin liquids (08/14/18 1010 : Dariela Benitez CCC-SLP)  Anticipated Dischage Disposition: skilled nursing facility (08/14/18 1010 : Dariela Benitez, CCC-SLP)            SLP GOALS     Row Name 08/13/18 1300 08/13/18 0836 08/11/18 1330       Oral Nutrition/Hydration Goal 1 (SLP)    Oral Nutrition/Hydration Goal 1, SLP LTG: Patient will tolerate LRD with no overt s/s of aspiration.  -MB LTG: Patient will tolerate LRD with no overt s/s of aspiration.  -MB LTG: Patient will tolerate LRD with no overt s/s of aspiration.  -KW    Time Frame (Oral Nutrition/Hydration Goal 1, SLP) by discharge  -MB by discharge  -MB by discharge  -KW    Barriers (Oral Nutrition/Hydration Goal 1, SLP) none  -MB none  -MB n/a  -KW    Progress/Outcomes (Oral Nutrition/Hydration Goal 1, SLP) continuing progress toward goal  -MB continuing progress toward goal  -MB goal ongoing  -KW       Pharyngeal Strengthening Exercise Goal 1 (SLP)    Activity (Pharyngeal Strengthening Goal 1, SLP) increase timing;increase epiglottic inversion and retroflexion;increase tongue base retraction  -MB increase timing;increase epiglottic inversion and retroflexion;increase tongue base retraction  -MB increase timing;increase epiglottic inversion and retroflexion;increase tongue base retraction  -KW    Increase Timing gustatory stimulation (sour/cold)  -MB gustatory stimulation (sour/cold)  -MB gustatory stimulation (sour/cold)  -KW    Increase Epiglottic Inversion and Retroflexion Mendelsohn;falsetto  -MB Mendelsohn;falsetto  -MB Mendelsohn;falsetto  -KW    Increase Tongue Base Retraction hard effortful swallow;imani  -MB hard effortful swallow;imani  -MB hard effortful  swallow;imani  -KW    [place]/Accuracy (Pharyngeal Strengthening Goal 1, SLP) independently (over 90% accuracy)  -MB independently (over 90% accuracy)  -MB independently (over 90% accuracy)  -KW    Time Frame (Pharyngeal Strengthening Goal 1, SLP) short term goal (STG);by discharge  -MB short term goal (STG);by discharge  -MB short term goal (STG);by discharge  -KW    Barriers (Pharyngeal Strengthening Goal 1, SLP) none  -MB n/a  -MB n/a  -KW    Progress/Outcomes (Pharyngeal Strengthening Goal 1, SLP) continuing progress toward goal  -MB goal ongoing  -MB goal ongoing  -KW      User Key  (r) = Recorded By, (t) = Taken By, (c) = Cosigned By    Initials Name Provider Type    Dariela Bellamy, CCC-SLP Speech and Language Pathologist    Aubree Cintron MS CCC-SLP Speech and Language Pathologist          EDUCATION  The patient has been educated in the following areas:   Dysphagia (Swallowing Impairment).    SLP Recommendation and Plan                       Anticipated Dischage Disposition: skilled nursing facility                Plan of Care Reviewed With: patient, spouse  Plan of Care Review  Plan of Care Reviewed With: patient, spouse  Daily Summary of Progress (SLP): progress toward functional goals as expected  Plan for Continued Treatment (SLP): Continue mechanical soft solids and thin liquids  Progress: no change  Outcome Summary: Pt was upgrade to thin liquids yesterday, but received thickened liquids on his breakfast tray today. He completed trials of thin with no overt s/s of aspiration. SLP notified dietary that the pt should receive thin liquids. Pt discharging to SNF today. Continue mechanical soft solids.        SLP Outcome Measures (last 72 hours)      SLP Outcome Measures     Row Name 08/11/18 1400             SLP Outcome Measures    Outcome Measure Used? Adult NOMS  -KW         Adult FCM Scores    FCM Chosen Swallowing  -KW      Swallowing FCM Score 5  -KW        User Key  (r) = Recorded  By, (t) = Taken By, (c) = Cosigned By    Initials Name Effective Dates    Aubree Cintron, MS KOREY-SLP 08/02/16 -              Time Calculation:         Time Calculation- SLP     Row Name 08/14/18 1021             Time Calculation- SLP    SLP Start Time 1010  -MB      SLP Stop Time 1019  -MB      SLP Time Calculation (min) 9 min  -MB      SLP Received On 08/14/18  -MB        User Key  (r) = Recorded By, (t) = Taken By, (c) = Cosigned By    Initials Name Provider Type    Dariela Bellamy CCC-SLP Speech and Language Pathologist          Therapy Charges for Today     Code Description Service Date Service Provider Modifiers Qty    77381467794 HC ST TREATMENT SWALLOW 1 8/13/2018 Dariela Benitez CCC-SLP GN, KX 1    30258212526 HC ST TREATMENT SWALLOW 1 8/13/2018 Dariela Benitez CCC-SLP GN, KX 1    01812397557 HC ST TREATMENT SWALLOW 1 8/14/2018 Dariela Benitez CCC-SLP GN, KX 1          SLP G-Codes  SLP NOMS Used?: Yes  Functional Limitations: Swallowing  Swallow Current Status (): At least 20 percent but less than 40 percent impaired, limited or restricted  Swallow Goal Status (): At least 1 percent but less than 20 percent impaired, limited or restricted      KAYLI Vela  8/14/2018

## 2018-08-14 NOTE — DISCHARGE SUMMARY
[facility]  DISCHARGE SUMMARY       Date of Admission: 8/11/2018  Date of Discharge:  8/14/2018  Primary Care Physician: Sergio Ma MD    Presenting Problem/History of Present Illness:  Left-sided weakness     Final Discharge Diagnoses:  1.  Right lacunar infarct with extension into the right paraventricular region in the right hemisphere  2.  Hyperlipidemia  3.  Insulin dependent diabetes mellitus, type II, with hyperglycemia   4.  Parkinson's Disease  5.  Hypertension  6.  Chronic kidney disease, stage III  7.  Gastroesophageal Reflux disease  8.  Depression    Consults:   1.  Neurology     Procedures Performed: None    Pertinent Test Results:   Lab Results (last 48 hours)     Procedure Component Value Units Date/Time    Basic Metabolic Panel [[id number]]  (Abnormal) Collected:  08/13/18 0643    Specimen:  Blood Updated:  08/13/18 0709     Glucose 182 (H) mg/dL      BUN 23 (H) mg/dL      Creatinine 1.15 mg/dL      Sodium 138 mmol/L      Potassium 4.3 mmol/L      Chloride 106 mmol/L      CO2 25.0 mmol/L      Calcium 8.4 mg/dL      eGFR Non African Amer 61 mL/min/1.73      BUN/Creatinine Ratio 20.0     Anion Gap 7.0 mmol/L     Narrative:       The MDRD GFR formula is only valid for adults with stable renal function between ages 18 and 70.    CBC Auto Differential [[id number]]  (Abnormal) Collected:  08/13/18 0643    Specimen:  Blood Updated:  08/13/18 0650     WBC 6.16 10*3/mm3      RBC 3.74 (L) 10*6/mm3      Hemoglobin 12.3 (L) g/dL      Hematocrit 35.7 (L) %      MCV 95.5 (H) fL      MCH 32.9 (H) pg      MCHC 34.5 g/dL      RDW 12.8 %      RDW-SD 45.1 fl      MPV 9.9 fL      Platelets 199 10*3/mm3      Neutrophil % 56.2 %      Lymphocyte % 25.8 %      Monocyte % 12.3 (H) %      Eosinophil % 4.4 (H) %      Basophil % 0.5 %      Immature Grans % 0.8 %      Neutrophils, Absolute 3.46 10*3/mm3      Lymphocytes, Absolute 1.59 10*3/mm3      Monocytes, Absolute  0.76 10*3/mm3      Eosinophils, Absolute 0.27 10*3/mm3      Basophils, Absolute 0.03 10*3/mm3      Immature Grans, Absolute 0.05 (H) 10*3/mm3      nRBC 0.0 /100 WBC     Hemoglobin A1c [[id number]] Collected:  08/12/18 1000    Specimen:  Blood Updated:  08/12/18 1238     Hemoglobin A1C 8.3 %     Narrative:       Less than 6.0           Non-Diabetic Range  6.0-7.0                 ADA Therapeutic Target  Greater than 7.0        Action Suggested    POC Glucose Once [[id number]]  (Abnormal) Collected:  08/12/18 1206    Specimen:  Blood Updated:  08/12/18 1223     Glucose 288 (H) mg/dL      Comment: : [id number] Hayes AllieMeter ID: [id number]       Vitamin B12 [[id number]]  (Abnormal) Collected:  08/12/18 1000    Specimen:  Blood Updated:  08/12/18 1159     Vitamin B-12 209 (L) pg/mL     Folate [[id number]] Collected:  08/12/18 1000    Specimen:  Blood Updated:  08/12/18 1159     Folate 15.30 ng/mL     Lipid Panel [[id number]]  (Abnormal) Collected:  08/12/18 1000    Specimen:  Blood Updated:  08/12/18 1105     Total Cholesterol 167 mg/dL      Triglycerides 198 (H) mg/dL      HDL Cholesterol 30 (L) mg/dL      LDL Cholesterol  101 (H) mg/dL      LDL/HDL Ratio 3.25    Basic Metabolic Panel [[id number]]  (Abnormal) Collected:  08/12/18 1000    Specimen:  Blood Updated:  08/12/18 1054     Glucose 297 (H) mg/dL      BUN 27 (H) mg/dL      Creatinine 1.29 mg/dL      Sodium 137 mmol/L      Potassium 4.6 mmol/L      Chloride 102 mmol/L      CO2 24.0 mmol/L      Calcium 9.0 mg/dL      eGFR Non African Amer 54 (L) mL/min/1.73      BUN/Creatinine Ratio 20.9     Anion Gap 11.0 mmol/L     Narrative:       The MDRD GFR formula is only valid for adults with stable renal function between ages 18 and 70.    P2Y12 Platelet Inhibition [[id number]] Collected:  08/12/18 1000    Specimen:  Blood Updated:  08/12/18 1051     Hematocrit 40.7 %      Platelets 238 10*3/mm3      P2Y12 Reactivity Unit 81 PRU     Narrative:       PRU reference range  194-418 is for patients not receiving P2Y12 drug. Post Drug results: Lower PRU levels are associated with expected antiplatelet effect. Values may be below the stated reference range above. The post-drug PRU values reported are .        CBC (No Diff) [[id number]]  (Abnormal) Collected:  08/12/18 1000    Specimen:  Blood Updated:  08/12/18 1047     WBC 6.11 10*3/mm3      RBC 4.22 (L) 10*6/mm3      Hemoglobin 13.9 (L) g/dL      Hematocrit 40.4 %      MCV 95.7 (H) fL      MCH 32.9 (H) pg      MCHC 34.4 g/dL      RDW 12.9 %      RDW-SD 45.9 fl      MPV 10.3 fL      Platelets 255 10*3/mm3      Imaging Results (last 7 days)     Procedure Component Value Units Date/Time    US Carotid Bilateral [[id number]] Collected:  08/13/18 1137     Updated:  08/13/18 1141    Narrative:       History: Stroke       Impression:       Impression:  1. There is less than 50% stenosis of the right internal carotid artery.  2. There is less than 50% stenosis of the left internal carotid artery.  3. Antegrade flow is demonstrated in bilateral vertebral arteries.     Comments: Bilateral carotid vertebral arterial duplex scan was  performed.     Grayscale imaging shows intimal thickening and calcified elements at the  carotid bifurcation. The right internal carotid artery peak systolic  velocity is 51.9 cm/sec. The end-diastolic velocity is 13 cm/sec. The  right ICA/CCA ratio is approximately 0.65 . These findings correlate  with less than 50% stenosis of the right internal carotid artery.     Grayscale imaging shows intimal thickening and calcified elements at the  carotid bifurcation. The left internal carotid artery peak systolic  velocity is 79 cm/sec. The end-diastolic velocity is 22.4 cm/sec. The  left ICA/CCA ratio is approximately 0.72 . These findings correlate with  less than 50% stenosis of the left internal carotid artery.     Antegrade flow is demonstrated in bilateral vertebral arteries.  There is greater than 50% stenosis of  the left external carotid artery.  This report was finalized on 08/13/2018 11:38 by Dr. Yo Cohen MD.    CT Head Without Contrast [[id number]] Collected:  08/12/18 1703     Updated:  08/12/18 1708    Narrative:       CT BRAIN without contrast 8/12/2018 4:34 PM CDT     HISTORY: Worsening neuro deficit     COMPARISON: August 11, 2018      DLP: 710 mGy cm     TECHNIQUE: Serial axial tomographic images of the brain were obtained  without the use of intravenous contrast.      FINDINGS:   The midline structures are nondisplaced. There is mild to moderate  cerebral and cerebellar volume loss, with an associated increase in the  prominence of the ventricles and sulci. The basilar cisterns are normal  in size and configuration. There is no evidence of intracranial  hemorrhage or mass-effect. There is low attenuation in the  periventricular white matter, consistent with chronic ischemic change.  Right basal ganglia lacunar infarctions noted. There are no abnormal  extra-axial fluid collections. There is no evidence of tonsillar  herniation.      The included orbits and their contents are unremarkable. The visualized  paranasal sinuses, mastoid air cells and middle ear cavities are clear.  The visualized osseous structures and overlying soft tissues of the  skull and face are intact.        Impression:       Changes of aging stable since August 11, 2018 no acute intracranial  abnormality..        This report was finalized on 08/12/2018 17:05 by Dr. Cesar Rodriguez MD.    MRI Brain Without Contrast [[id number]] Collected:  08/11/18 1537     Updated:  08/11/18 1542    Narrative:       MRI BRAIN WO CONTRAST- 8/11/2018 3:13 PM CDT     HISTORY: Stroke; I63.9-Cerebral infarction, unspecified;  R13.12-Dysphagia, oropharyngeal phase     COMPARISON: None.       TECHNIQUE: Multiplanar imaging of the brain was performed in a routine  fashion without contrast.     FINDINGS:   Diffusion: In the right hemisphere restricted diffusion is  noted in the  basal ganglia region extending to the right paraventricular region..     Midline structures: Nondisplaced.     Ventricles: Normal in configuration and symmetric in size.     Masses: No masses or mass effect.     Basilar cisterns: Maintained.     Extra axial space: No abnormal extra-axial fluid.     Gray-white matter signal: Mild changes of aging are noted. There some  slight increased signal in the paraventricular white matter.     Cerebellum: Normal.     Brainstem: Normal.     Other: Proximal cervical spinal cord is normal. Bilateral globes and  orbits are normal in appearance. Normal cerebrovascular flow voids  noted. Mucous cyst is present right maxillary antrum mastoid air cells  are normally pneumatized.       Impression:       1. Ischemic infarction of right basal ganglia  extending to the right  paraventricular region in the right hemisphere   This report was finalized on 08/11/2018 15:39 by Dr. Cesar Rodriguez MD.    CT Head Without Contrast [[id number]] Collected:  08/11/18 1044     Updated:  08/11/18 1048    Narrative:       EXAMINATION:   CT HEAD WO CONTRAST-  8/11/2018 10:44 AM CDT     CT BRAIN without contrast 8/11/2018 10:25 AM CDT     HISTORY: Weakness     COMPARISON: None      DLP: 661 mGy cm     TECHNIQUE: Serial axial tomographic images of the brain were obtained  without the use of intravenous contrast.      FINDINGS:   The midline structures are nondisplaced. There is moderate cerebral and  cerebellar volume loss, with an associated increase in the prominence of  the ventricles and sulci. The basilar cisterns are normal in size and  configuration. There is no evidence of intracranial hemorrhage or  mass-effect. There is low attenuation in the periventricular white  matter, consistent with chronic ischemic change. There are no abnormal  extra-axial fluid collections. There is no evidence of tonsillar  herniation.      The included orbits and their contents are unremarkable. The  visualized  paranasal sinuses, mastoid air cells and middle ear cavities are clear.  The visualized osseous structures and overlying soft tissues of the  skull and face are intact.        Impression:       Changes of aging with no acute intracranial abnormality  This report was finalized on 08/11/2018 10:45 by Dr. Cesar Rodriguez MD.    XR Chest 1 View [[id number]] Collected:  08/11/18 1020     Updated:  08/11/18 1023    Narrative:       EXAMINATION:   XR CHEST 1 VW-  8/11/2018 10:20 AM CDT     HISTORY: Left-sided weakness     Frontal upright radiograph of the chest 8/11/2018 10:18 AM CDT     COMPARISON: None.     FINDINGS:   The lungs are clear. The cardiomediastinal silhouette and pulmonary  vascularity are within normal limits.      The osseous structures and surrounding soft tissues demonstrate no acute  abnormality.       Impression:       1. No radiographic evidence of acute cardiopulmonary process.        This report was finalized on 08/11/2018 10:20 by Dr. Cesar Rodriguez MD.        Hospital Course:  The patient is a 78 y.o. male who presented to [facility] with left sided weakness.  The patient has past medical history significant for a previous cerebrovascular accident, diabetes on insulin therapy, hyperlipidemia, hypertension, Parkinson's disease, depression and GERD.  It also appears that he has a form of chronic kidney disease, stage III.  He previously had a stroke almost 2 years ago and suffered resdiual left-sided weakness.  Since that time he has regained some use of his left upper and lower extremities.  He apparently suffered a fall on Sunday last week and was hospitalized at [facility] in [place], [place].  He was discharged from [facility] on 8/10/18 to [facility] nursing and rehabilitation.  The patient states that he developed increased weakness to his left side and increased numbness at around midnight Friday night and it progressively  worsened throughout the night.  Per the nursing staff at [facility], the nurse's aide was able to dress him at 7:30 Saturday morning without any difficulty.  Then at 8:30 he had profound left-sided facial weakness and drooling.  Per reports he was unable to chew his food and it was falling out of his mouth.  However the patient insists that it started at midnight, therefore he was out of the window for TPA since his onset was greater than 3-4 hours.  He had a CT scan of his head that was negative in the emergency department.  He was admitted for further evaluation.      He was taken for an MRI that showed a right basal ganglia stroke with extension into the right paraventricular region in the right hemisphere.  He also had a 2D echocardiogram and carotid duplex performed that were both negative for an acute cause of a new stroke.  No stenosis noted on carotid duplex and no masses, shunts or clots on the echo.  He was evaluated by physical, occupation and speech therapy while admitted to the hospital.  Physical and occupational therapy recommended skilled nursing facility at discharge due to his profound weakness and left sided weakness.  He has no use of neither left sided extremity, prior to this stroke he did have some use.  He was initially at [facility] for rehab but now after this episode the wife realizes that this is not going to be a short process.  She now wants him to be a resident at [facility] in [place], [place] since it is closer to where they live.      Speech therapy has also worked with the patient and has been doing exercises with him.  His current diet is soft texture, ground with thin liquids at this time.  He will need continued speech therapy.    The patient was also evaluated by Neurology, their recommendations were permissive hypertension for the first 24 to 48 hours which have now passed.  Blood pressures will now be more controlled and his anti-hypertensive medications will  "be resumed as appropriate.  He was also found to have an elevated hemoglobin A1C of 8.3 suggesting that he needs tighter glycemic control.  His wife states that previously he was doing his own insulin and she does not think he was doing it appropriately.  She is now hoping that it will be better now that it will be given to him by a nurse.  He was previously on aspirin and plavix it has been recommended by neurology that he continue this regimen.  His lipid profile showed a LDL of 101, goal would be < 70.  He will no longer be taking pravastatin, he will now be on high dose Lipitor.  Liver enzymes should be check in about six weeks for surveillance.    He was noted to be vitamin B12 deficient and was started on replacement with IM B12 injections for a total of 5 days.  He will get his first one today at our facility this will need to be continued at [facility].     He is stable and in good condition for discharge to [facility] today.  He will need to be seen by the NH provider in one week.  He will be evaluated by PT/OT/ST and continue his rehab at the SNF facility.       Condition on Discharge:  Stable     Physical Exam on Discharge:  /68 (BP Location: Right arm, Patient Position: Lying)   Pulse 58   Temp 98.3 °F (36.8 °C) (Oral)   Resp 18   Ht 182.9 cm (72.01\")   Wt 95.3 kg (210 lb)   SpO2 95%   BMI 28.47 kg/m²      Physical Exam  Constitutional: He is oriented to person, place, and time. He appears well-developed and well-nourished.   Obese. NAD.  Sitting up in bed.     HENT:   Head: Normocephalic and atraumatic.   Eyes: Pupils are equal, round, and reactive to light. Conjunctivae and EOM are normal.   Neck: Neck supple. No JVD present. No thyromegaly present.   Cardiovascular: Regular rhythm, normal heart sounds and intact distal pulses.  Bradycardia present.  Exam reveals no gallop and no friction rub.    No murmur heard.  SB 50-68  Pulmonary/Chest: Effort normal and breath sounds normal. " No respiratory distress. He has no wheezes. He has no rales. He exhibits no tenderness.   diminished    Abdominal: Soft. Bowel sounds are normal. He exhibits distension (obese abdomen ). There is no tenderness. There is no rebound and no guarding.   Musculoskeletal: He exhibits no edema, tenderness or deformity.   Lymphadenopathy:     He has no cervical adenopathy.   Neurological: He is alert and oriented to person, place, and time. A sensory deficit is present.   Left upper and lower extremity flaccid    Skin: Skin is warm and dry. No rash noted.   Psychiatric: He has a normal mood and affect. His behavior is normal. Judgment and thought content normal.   Nursing note and vitals reviewed.    Discharge Disposition:  Skilled Nursing Facility ([place] - External)    Discharge Medications:     Discharge Medications      New Medications      Instructions Start Date   atorvastatin 80 MG tablet  Commonly known as:  LIPITOR   80 mg, Oral, Nightly      cyanocobalamin 1000 MCG/ML injection   1,000 mcg, Intramuscular, Daily      vitamin B-12 250 MCG tablet  Commonly known as:  CYANOCOBALAMIN   250 mcg, Oral, Daily   Start Date:  8/19/2018        Continue These Medications      Instructions Start Date   APIDRA 100 UNIT/ML injection  Generic drug:  insulin glulisine   35 Units, Subcutaneous, Daily With Breakfast      aspirin 81 MG chewable tablet   81 mg, Oral, Daily      carbidopa-levodopa  MG per tablet  Commonly known as:  SINEMET   2 tablets, Oral, 3 Times Daily      clopidogrel 75 MG tablet  Commonly known as:  PLAVIX   75 mg, Oral, Daily      escitalopram 20 MG tablet  Commonly known as:  LEXAPRO   20 mg, Oral, Daily      hydrALAZINE 25 MG tablet  Commonly known as:  APRESOLINE   25 mg, Oral, 2 Times Daily      insulin detemir 100 UNIT/ML injection  Commonly known as:  LEVEMIR   30 Units, Subcutaneous, Nightly      olmesartan 20 MG tablet  Commonly known as:  BENICAR   40 mg, Oral, Daily      pantoprazole 40 MG EC  tablet  Commonly known as:  PROTONIX   40 mg, Oral, Daily      traZODone 150 MG tablet  Commonly known as:  DESYREL   150 mg, Oral, Nightly      TRIGLIDE 160 MG tablet  Generic drug:  fenofibrate   160 mg, Oral, Nightly         Stop These Medications    metoprolol succinate XL 25 MG 24 hr tablet  Commonly known as:  TOPROL-XL     pravastatin 40 MG tablet  Commonly known as:  PRAVACHOL          Discharge Diet:   Diet Instructions     Diet: Soft Texture, Consistent Carbohydrate, Cardiac; Thin Liquids, No Restrictions; Ground       Discharge Diet:   Soft Texture  Consistent Carbohydrate  Cardiac       Fluid Consistency:  Thin Liquids, No Restrictions    Soft Options:  Ground        Activity at Discharge:   Activity Instructions     Activity as Tolerated           Discharge Care Plan/Instructions:   1.  PT/OT/ST evaluate and treat at SNF    Follow-up Appointments:   1.  To be seen by NH provider in one week  2.  Follow up with Neurology 4 weeks.    Test Results Pending at Discharge: None    BRENNA Trinh  08/14/18  7:18 AM    Time: 35 minutes     I personally evaluated and examined the patient in conjunction with BRENNA Kaur and agree with the assessment, treatment plan, and disposition of the patient as recorded by her. My history, exam, and further recommendations are:     No new problems.  Seen and discussed with his wife.  He is ready for transition to [facility] today.    Neurology saw him again last night and recommended him to continue on aspirin and Plavix dual antiplatelet therapy.  They have signed off.    Harris Patel DO  08/14/18  1:33 PM

## 2018-08-14 NOTE — PROGRESS NOTES
Continued Stay Note  [facility]     Patient Name: Angelika Sapp  MRN: [id number]  Today's Date: 8/14/2018    Admit Date: 8/11/2018          Discharge Plan     Row Name 08/14/18 0811       Plan    Plan [facility]    Patient/Family in Agreement with Plan yes    Final Discharge Disposition Code 03 - skilled nursing facility (SNF)    Final Note Patient is discharged to [facility] today, skilled. Discharge summary, discharge med list, and a copy of scripts to be faxed to [phone]. Patient's nurse will call report to [phone]. Plan EMS transport.             Expected Discharge Date and Time     Expected Discharge Date Expected Discharge Time    Aug 14, 2018             GWEN New

## 2018-08-15 NOTE — THERAPY DISCHARGE NOTE
Acute Care - Speech Language Pathology Discharge Summary  [facility]       Patient Name: Angelika Sapp  : 1940  MRN: [id number]    Today's Date: 8/15/2018  Onset of Illness/Injury or Date of Surgery: 18       Referring Physician: Mick Francisco        Admit Date: 2018      SLP Recommendation and Plan  Mechanical soft solids and thin liquids    Visit Dx:    ICD-10-CM ICD-9-CM   1. Cerebrovascular accident (CVA), unspecified mechanism (CMS/[facility]) I63.9 434.91   2. Oropharyngeal dysphagia R13.12 787.22   3. Impaired mobility and ADLs Z74.09 799.89   4. Impaired mobility Z74.09 799.89                     SLP GOALS     Row Name 08/15/18 0912 18 1300 18 0836       Oral Nutrition/Hydration Goal 1 (SLP)    Oral Nutrition/Hydration Goal 1, SLP LTG: Patient will tolerate LRD with no overt s/s of aspiration.  -MB LTG: Patient will tolerate LRD with no overt s/s of aspiration.  -MB LTG: Patient will tolerate LRD with no overt s/s of aspiration.  -MB    Time Frame (Oral Nutrition/Hydration Goal 1, SLP) by discharge  -MB by discharge  -MB by discharge  -MB    Barriers (Oral Nutrition/Hydration Goal 1, SLP) none  -MB none  -MB none  -MB    Progress/Outcomes (Oral Nutrition/Hydration Goal 1, SLP) goal partially met  -MB continuing progress toward goal  -MB continuing progress toward goal  -MB       Pharyngeal Strengthening Exercise Goal 1 (SLP)    Activity (Pharyngeal Strengthening Goal 1, SLP) increase timing;increase epiglottic inversion and retroflexion;increase tongue base retraction  -MB increase timing;increase epiglottic inversion and retroflexion;increase tongue base retraction  -MB increase timing;increase epiglottic inversion and retroflexion;increase tongue base retraction  -MB    Increase Timing gustatory stimulation (sour/cold)  -MB gustatory stimulation (sour/cold)  -MB gustatory stimulation (sour/cold)  -MB    Increase Epiglottic Inversion and Retroflexion Mendelsohn;falsetto  -MB  Mendelsohn;falsetto  -MB Mendelsohn;falsetto  -MB    Increase Tongue Base Retraction hard effortful swallow;imani  -MB hard effortful swallow;imani  -MB hard effortful swallow;imani  -MB    [place]/Accuracy (Pharyngeal Strengthening Goal 1, SLP) independently (over 90% accuracy)  -MB independently (over 90% accuracy)  -MB independently (over 90% accuracy)  -MB    Time Frame (Pharyngeal Strengthening Goal 1, SLP) short term goal (STG);by discharge  -MB short term goal (STG);by discharge  -MB short term goal (STG);by discharge  -MB    Barriers (Pharyngeal Strengthening Goal 1, SLP) none  -MB none  -MB n/a  -MB    Progress/Outcomes (Pharyngeal Strengthening Goal 1, SLP) goal partially met  -MB continuing progress toward goal  -MB goal ongoing  -MB      User Key  (r) = Recorded By, (t) = Taken By, (c) = Cosigned By    Initials Name Provider Type    Dariela Bellamy CCC-SLP Speech and Language Pathologist            Therapy Charges for Today     Code Description Service Date Service Provider Modifiers Qty    [id number] HC ST TREATMENT SWALLOW 1 8/14/2018 Dariela Benitez CCC-SLP GN, KX 1            SLP Discharge Summary  Anticipated Dischage Disposition: skilled nursing facility  Reason for Discharge: discharge from this facility  Progress Toward Achieving Short/long Term Goals: goals partially met within established timelines  Discharge Destination: SNF      KAYLI Vela  8/15/2018

## 2018-08-15 NOTE — THERAPY DISCHARGE NOTE
Acute Care - Physical Therapy Discharge Summary  [facility]       Patient Name: Angelika Sapp  : 1940  MRN: [id number]    Today's Date: 8/15/2018  Onset of Illness/Injury or Date of Surgery: 18    Date of Referral to PT: 18  Referring Physician: Mick Francisco      Admit Date: 2018      PT Recommendation and Plan    Visit Dx:    ICD-10-CM ICD-9-CM   1. Cerebrovascular accident (CVA), unspecified mechanism (CMS/HCC) I63.9 434.91   2. Oropharyngeal dysphagia R13.12 787.22   3. Impaired mobility and ADLs Z74.09 799.89   4. Impaired mobility Z74.09 799.89             Outcome Measures     Row Name 18 1149 18 1100 18 1600       How much help from another person do you currently need...    Turning from your back to your side while in flat bed without using bedrails? 2  -LH  --  --    Moving from lying on back to sitting on the side of a flat bed without bedrails? 2  -LH  --  --    Moving to and from a bed to a chair (including a wheelchair)? 1  -LH  --  --    Standing up from a chair using your arms (e.g., wheelchair, bedside chair)? 1  -LH  --  --    Climbing 3-5 steps with a railing? 1  -LH  --  --    To walk in hospital room? 1  -LH  --  --    AM-PAC 6 Clicks Score 8  -LH  --  --       How much help from another is currently needed...    Putting on and taking off regular lower body clothing?  -- 2  -TS 1  -MM    Bathing (including washing, rinsing, and drying)  -- 2  -TS 1  -MM    Toileting (which includes using toilet bed pan or urinal)  -- 2  -TS 1  -MM    Putting on and taking off regular upper body clothing  -- 2  -TS 2  -MM    Taking care of personal grooming (such as brushing teeth)  -- 3  -TS 2  -MM    Eating meals  -- 3  -TS 2  -MM    Score  -- 14  -TS 9  -MM       Modified Franklin Scale    Modified Franklin Scale  --  -- 4 - Moderately severe disability.  Unable to walk without assistance, and unable to attend to own bodily needs without assistance.  -MM       Functional  Assessment    Outcome Measure Options AM-PAC 6 Clicks Basic Mobility (PT)  - AM-PAC 6 Clicks Daily Activity (OT)  -TS AM-PAC 6 Clicks Daily Activity (OT);Modified Grady  -MM      User Key  (r) = Recorded By, (t) = Taken By, (c) = Cosigned By    Initials Name Provider Type    LH Abdullahi Magaña, PT Physical Therapist    TS Marilin Zimmer, FIGUEREDO/L Occupational Therapy Assistant    MM Obie Negrete, OTR/L Occupational Therapist            Therapy Suggested Charges     Code   Minutes Charges    84494 (CPT®) Hc Pt Neuromusc Re Education Ea 15 Min      17562 (CPT®) Hc Pt Ther Proc Ea 15 Min 15 1    85080 (CPT®) Hc Gait Training Ea 15 Min      45285 (CPT®) Hc Pt Therapeutic Act Ea 15 Min 15 1    04154 (CPT®) Hc Pt Manual Therapy Ea 15 Min      99080 (CPT®) Hc Pt Iontophoresis Ea 15 Min      59693 (CPT®) Hc Pt Elec Stim Ea-Per 15 Min      26584 (CPT®) Hc Pt Ultrasound Ea 15 Min      54422 (CPT®) Hc Pt Self Care/Mgmt/Train Ea 15 Min      11124 (CPT®) Hc Pt Prosthetic (S) Train Initial Encounter, Each 15 Min      32883 (CPT®) Hc Pt Orthotic(S)/Prosthetic(S) Encounter, Each 15 Min      25027 (CPT®) Hc Orthotic(S) Mgmt/Train Initial Encounter, Each 15min      Total  30 2                PT Rehab Goals     Row Name 08/15/18 0912             Bed Mobility Goal 1 (PT)    Activity/Assistive Device (Bed Mobility Goal 1, PT) bed mobility activities, all  -      [place] Level/Cues Needed (Bed Mobility Goal 1, PT) standby assist  -      Time Frame (Bed Mobility Goal 1, PT) by discharge  -      Progress/Outcomes (Bed Mobility Goal 1, PT) goal not met  -         Transfer Goal 1 (PT)    Activity/Assistive Device (Transfer Goal 1, PT) sit-to-stand/stand-to-sit;bed-to-chair/chair-to-bed;walker, kaycee   sling LUE  -      [place] Level/Cues Needed (Transfer Goal 1, PT) minimum assist (75% or more patient effort)  -      Time Frame (Transfer Goal 1, PT) by discharge  -AH      Progress/Outcome (Transfer Goal 1, PT)  goal not met  -         Strength Goal 1 (PT)    Strength Goal 1 (PT) Pt able to tolerate 20 minutes in standing frame  -      Time Frame (Strength Goal 1, PT) by discharge  -      Progress/Outcome (Strength Goal 1, PT) goal not met  -        User Key  (r) = Recorded By, (t) = Taken By, (c) = Cosigned By    Initials Name Provider Type [facility] Mary Wallis, PTA Physical Therapy Assistant PT          08/15/18 0912   Physical Therapy Goals   Bed Mobility Goal Selection (PT) bed mobility, PT goal 1   Transfer Goal Selection (PT) transfer, PT goal 1   Strength Goal Selection (PT) strength, PT goal 1   Balance Goal Selection (PT) balance, PT goal 1   Bed Mobility Goal 1 (PT)   Activity/Assistive Device (Bed Mobility Goal 1, PT) bed mobility activities, all   [place] Level/Cues Needed (Bed Mobility Goal 1, PT) standby assist   Time Frame (Bed Mobility Goal 1, PT) by discharge   Progress/Outcomes (Bed Mobility Goal 1, PT) goal not met   Transfer Goal 1 (PT)   Activity/Assistive Device (Transfer Goal 1, PT) sit-to-stand/stand-to-sit;bed-to-chair/chair-to-bed;walker, kaycee  (sling LUE)   [place] Level/Cues Needed (Transfer Goal 1, PT) minimum assist (75% or more patient effort)   Time Frame (Transfer Goal 1, PT) by discharge   Progress/Outcome (Transfer Goal 1, PT) goal not met   Strength Goal 1 (PT)   Strength Goal 1 (PT) Pt able to tolerate 20 minutes in standing frame   Time Frame (Strength Goal 1, PT) by discharge   Progress/Outcome (Strength Goal 1, PT) goal not met   Balance Goal 1 (PT)   Activity/Assistive Device (Balance Goal 1, PT) sitting, dynamic   [place] Level/Cues Needed (Balance Goal 1, PT) standby assist   Time Frame (Balance Goal 1, PT) by discharge   Progress/Outcomes (Balance Goal 1, PT) goal not met           PT Discharge Summary  Reason for Discharge: Discharge from facility  Outcomes Achieved: Refer to plan of care for updates on goals achieved  Discharge Destination:  [facility]      Mary Wallis, PTA   8/15/2018

## 2018-08-15 NOTE — THERAPY DISCHARGE NOTE
Acute Care - Occupational Therapy Discharge Summary  [facility]     Patient Name: Angelika Sapp  : 1940  MRN: [id number]    Today's Date: 8/15/2018  Onset of Illness/Injury or Date of Surgery: 18    Date of Referral to OT: 18  Referring Physician: Mick Francisco      Admit Date: 2018        OT Recommendation and Plan    Visit Dx:    ICD-10-CM ICD-9-CM   1. Cerebrovascular accident (CVA), unspecified mechanism (CMS/HCC) I63.9 434.91   2. Oropharyngeal dysphagia R13.12 787.22   3. Impaired mobility and ADLs Z74.09 799.89   4. Impaired mobility Z74.09 799.89                     OT Rehab Goals     Row Name 08/15/18 0700             Bed Mobility Goal 1 (OT)    Activity/Assistive Device (Bed Mobility Goal 1, OT) bed mobility activities, all  -CS      [place] Level/Cues Needed (Bed Mobility Goal 1, OT) moderate assist (50-74% patient effort);verbal cues required  -CS      Time Frame (Bed Mobility Goal 1, OT) 10 days  -CS      Progress/Outcomes (Bed Mobility Goal 1, OT) goal not met  -CS         Grooming Goal 1 (OT)    Activity/Device (Grooming Goal 1, OT) grooming skills, all  -CS      [place] (Grooming Goal 1, OT) minimum assist (75% or more patient effort);set-up required  -CS      Time Frame (Grooming Goal 1, OT) 10 days  -CS      Progress/Outcome (Grooming Goal 1, OT) goal not met  -CS         Self-Feeding Goal 1 (OT)    Activity/Assistive Device (Self-Feeding Goal 1, OT) self-feeding skills, all  -CS      [place] Level/Cues Needed (Self-Feeding Goal 1, OT) minimum assist (75% or more patient effort);set-up required  -CS      Time Frame (Self-Feeding Goal 1, OT) 10 days  -CS      Progress/Outcomes (Self-Feeding Goal 1, OT) goal not met  -CS        User Key  (r) = Recorded By, (t) = Taken By, (c) = Cosigned By    Initials Name Provider Type Discipline    CS Paty Welch, OTR/L Occupational Therapist OT                Outcome Measures     Row Name 18 1149 18 1100  08/12/18 1600       How much help from another person do you currently need...    Turning from your back to your side while in flat bed without using bedrails? 2  -LH  --  --    Moving from lying on back to sitting on the side of a flat bed without bedrails? 2  -LH  --  --    Moving to and from a bed to a chair (including a wheelchair)? 1  -LH  --  --    Standing up from a chair using your arms (e.g., wheelchair, bedside chair)? 1  -LH  --  --    Climbing 3-5 steps with a railing? 1  -LH  --  --    To walk in hospital room? 1  -LH  --  --    AM-PAC 6 Clicks Score 8  -LH  --  --       How much help from another is currently needed...    Putting on and taking off regular lower body clothing?  -- 2  -TS 1  -MM    Bathing (including washing, rinsing, and drying)  -- 2  -TS 1  -MM    Toileting (which includes using toilet bed pan or urinal)  -- 2  -TS 1  -MM    Putting on and taking off regular upper body clothing  -- 2  -TS 2  -MM    Taking care of personal grooming (such as brushing teeth)  -- 3  -TS 2  -MM    Eating meals  -- 3  -TS 2  -MM    Score  -- 14  -TS 9  -MM       Modified [place] Scale    Modified [place] Scale  --  -- 4 - Moderately severe disability.  Unable to walk without assistance, and unable to attend to own bodily needs without assistance.  -MM       Functional Assessment    Outcome Measure Options AM-PAC 6 Clicks Basic Mobility (PT)  - AM-PAC 6 Clicks Daily Activity (OT)  - AM-PAC 6 Clicks Daily Activity (OT);Modified [place]  -MM      User Key  (r) = Recorded By, (t) = Taken By, (c) = Cosigned By    Initials Name Provider Type     Abdullahi Magaña, PT Physical Therapist    TS Marilin Zimmer, FIGUEREDO/L Occupational Therapy Assistant    MM Obie Negrete, OTR/L Occupational Therapist          Therapy Suggested Charges     Code   Minutes Charges    42776 (CPT®) Hc Ot Neuromusc Re Education Ea 15 Min      14545 (CPT®) Hc Ot Ther Proc Ea 15 Min      90317 (CPT®) Hc Ot Therapeutic Act Ea 15 Min       36764 (CPT®) Hc Ot Manual Therapy Ea 15 Min      53020 (CPT®) Hc Ot Iontophoresis Ea 15 Min      91618 (CPT®) Hc Ot Elec Stim Ea-Per 15 Min      61644 (CPT®) Hc Ot Ultrasound Ea 15 Min      26332 (CPT®) Hc Ot Self Care/Mgmt/Train Ea 15 Min 54 4    Total  54 4              OT Discharge Summary  Anticipated Discharge Disposition (OT): skilled nursing facility  Reason for Discharge: Discharge from facility  Outcomes Achieved: Refer to plan of care for updates on goals achieved  Discharge Destination: SNF      Paty Welch, OTR/L  8/15/2018

## 2018-11-01 ENCOUNTER — APPOINTMENT (OUTPATIENT)
Dept: CARDIOLOGY | Facility: HOSPITAL | Age: 78
End: 2018-11-01

## 2018-11-01 ENCOUNTER — HOSPITAL ENCOUNTER (INPATIENT)
Facility: HOSPITAL | Age: 78
LOS: 3 days | Discharge: NURSING, REHAB OR LONG-TERM CARE | End: 2018-11-04
Attending: FAMILY MEDICINE | Admitting: FAMILY MEDICINE

## 2018-11-01 DIAGNOSIS — Z78.9 IMPAIRED MOBILITY AND ADLS: ICD-10-CM

## 2018-11-01 DIAGNOSIS — Z74.09 IMPAIRED MOBILITY AND ADLS: ICD-10-CM

## 2018-11-01 PROBLEM — I26.99 BILATERAL PULMONARY EMBOLISM (HCC): Status: ACTIVE | Noted: 2018-11-01

## 2018-11-01 LAB
GLUCOSE BLDC GLUCOMTR-MCNC: 193 MG/DL (ref 70–130)
INR PPP: 1 (ref 0.91–1.09)
PROTHROMBIN TIME: 13.5 SECONDS (ref 11.9–14.6)
TROPONIN I SERPL-MCNC: 0.61 NG/ML (ref 0–0.03)

## 2018-11-01 PROCEDURE — 93308 TTE F-UP OR LMTD: CPT | Performed by: INTERNAL MEDICINE

## 2018-11-01 PROCEDURE — 93325 DOPPLER ECHO COLOR FLOW MAPG: CPT | Performed by: INTERNAL MEDICINE

## 2018-11-01 PROCEDURE — 82962 GLUCOSE BLOOD TEST: CPT

## 2018-11-01 PROCEDURE — 93321 DOPPLER ECHO F-UP/LMTD STD: CPT | Performed by: INTERNAL MEDICINE

## 2018-11-01 PROCEDURE — 84484 ASSAY OF TROPONIN QUANT: CPT | Performed by: NURSE PRACTITIONER

## 2018-11-01 PROCEDURE — 63710000001 INSULIN LISPRO (HUMAN) PER 5 UNITS: Performed by: NURSE PRACTITIONER

## 2018-11-01 PROCEDURE — 63710000001 INSULIN DETEMIR PER 5 UNITS: Performed by: NURSE PRACTITIONER

## 2018-11-01 PROCEDURE — 93325 DOPPLER ECHO COLOR FLOW MAPG: CPT

## 2018-11-01 PROCEDURE — 93308 TTE F-UP OR LMTD: CPT

## 2018-11-01 PROCEDURE — 85610 PROTHROMBIN TIME: CPT | Performed by: NURSE PRACTITIONER

## 2018-11-01 PROCEDURE — 94799 UNLISTED PULMONARY SVC/PX: CPT

## 2018-11-01 PROCEDURE — 25010000002 PERFLUTREN 6.52 MG/ML SUSPENSION: Performed by: FAMILY MEDICINE

## 2018-11-01 PROCEDURE — 25010000002 ENOXAPARIN PER 10 MG: Performed by: NURSE PRACTITIONER

## 2018-11-01 PROCEDURE — 93321 DOPPLER ECHO F-UP/LMTD STD: CPT

## 2018-11-01 RX ORDER — SODIUM CHLORIDE 0.9 % (FLUSH) 0.9 %
3 SYRINGE (ML) INJECTION EVERY 12 HOURS SCHEDULED
Status: DISCONTINUED | OUTPATIENT
Start: 2018-11-01 | End: 2018-11-04 | Stop reason: HOSPADM

## 2018-11-01 RX ORDER — ACETAMINOPHEN 325 MG/1
650 TABLET ORAL EVERY 4 HOURS PRN
Status: DISCONTINUED | OUTPATIENT
Start: 2018-11-01 | End: 2018-11-04 | Stop reason: HOSPADM

## 2018-11-01 RX ORDER — SODIUM CHLORIDE 0.9 % (FLUSH) 0.9 %
3-10 SYRINGE (ML) INJECTION AS NEEDED
Status: DISCONTINUED | OUTPATIENT
Start: 2018-11-01 | End: 2018-11-04 | Stop reason: HOSPADM

## 2018-11-01 RX ORDER — PANTOPRAZOLE SODIUM 40 MG/1
40 TABLET, DELAYED RELEASE ORAL
Status: DISCONTINUED | OUTPATIENT
Start: 2018-11-02 | End: 2018-11-04 | Stop reason: HOSPADM

## 2018-11-01 RX ORDER — LOSARTAN POTASSIUM 50 MG/1
100 TABLET ORAL
Status: DISCONTINUED | OUTPATIENT
Start: 2018-11-01 | End: 2018-11-04 | Stop reason: HOSPADM

## 2018-11-01 RX ORDER — ONDANSETRON 4 MG/1
4 TABLET, ORALLY DISINTEGRATING ORAL EVERY 6 HOURS PRN
Status: DISCONTINUED | OUTPATIENT
Start: 2018-11-01 | End: 2018-11-04 | Stop reason: HOSPADM

## 2018-11-01 RX ORDER — IPRATROPIUM BROMIDE AND ALBUTEROL SULFATE 2.5; .5 MG/3ML; MG/3ML
3 SOLUTION RESPIRATORY (INHALATION) EVERY 6 HOURS PRN
Status: DISCONTINUED | OUTPATIENT
Start: 2018-11-01 | End: 2018-11-04 | Stop reason: HOSPADM

## 2018-11-01 RX ORDER — SODIUM CHLORIDE 9 MG/ML
100 INJECTION, SOLUTION INTRAVENOUS CONTINUOUS
Status: DISCONTINUED | OUTPATIENT
Start: 2018-11-01 | End: 2018-11-02

## 2018-11-01 RX ORDER — ONDANSETRON 4 MG/1
4 TABLET, FILM COATED ORAL EVERY 6 HOURS PRN
Status: DISCONTINUED | OUTPATIENT
Start: 2018-11-01 | End: 2018-11-04 | Stop reason: HOSPADM

## 2018-11-01 RX ORDER — HYDRALAZINE HYDROCHLORIDE 25 MG/1
25 TABLET, FILM COATED ORAL 2 TIMES DAILY
Status: DISCONTINUED | OUTPATIENT
Start: 2018-11-01 | End: 2018-11-04 | Stop reason: HOSPADM

## 2018-11-01 RX ORDER — TRAZODONE HYDROCHLORIDE 150 MG/1
150 TABLET ORAL NIGHTLY
Status: DISCONTINUED | OUTPATIENT
Start: 2018-11-01 | End: 2018-11-04 | Stop reason: HOSPADM

## 2018-11-01 RX ORDER — GABAPENTIN 300 MG/1
300 CAPSULE ORAL NIGHTLY
Status: DISCONTINUED | OUTPATIENT
Start: 2018-11-01 | End: 2018-11-04 | Stop reason: HOSPADM

## 2018-11-01 RX ORDER — ESCITALOPRAM OXALATE 10 MG/1
20 TABLET ORAL DAILY
Status: DISCONTINUED | OUTPATIENT
Start: 2018-11-01 | End: 2018-11-04 | Stop reason: HOSPADM

## 2018-11-01 RX ORDER — DEXTROSE MONOHYDRATE 25 G/50ML
25 INJECTION, SOLUTION INTRAVENOUS
Status: DISCONTINUED | OUTPATIENT
Start: 2018-11-01 | End: 2018-11-04 | Stop reason: HOSPADM

## 2018-11-01 RX ORDER — NICOTINE POLACRILEX 4 MG
15 LOZENGE BUCCAL
Status: DISCONTINUED | OUTPATIENT
Start: 2018-11-01 | End: 2018-11-04 | Stop reason: HOSPADM

## 2018-11-01 RX ORDER — GABAPENTIN 300 MG/1
300 CAPSULE ORAL NIGHTLY
COMMUNITY
End: 2018-11-07

## 2018-11-01 RX ORDER — ONDANSETRON 2 MG/ML
4 INJECTION INTRAMUSCULAR; INTRAVENOUS EVERY 6 HOURS PRN
Status: DISCONTINUED | OUTPATIENT
Start: 2018-11-01 | End: 2018-11-04 | Stop reason: HOSPADM

## 2018-11-01 RX ORDER — ATORVASTATIN CALCIUM 40 MG/1
80 TABLET, FILM COATED ORAL NIGHTLY
Status: DISCONTINUED | OUTPATIENT
Start: 2018-11-01 | End: 2018-11-04 | Stop reason: HOSPADM

## 2018-11-01 RX ORDER — CLOPIDOGREL BISULFATE 75 MG/1
75 TABLET ORAL DAILY
Status: DISCONTINUED | OUTPATIENT
Start: 2018-11-01 | End: 2018-11-04 | Stop reason: HOSPADM

## 2018-11-01 RX ORDER — ASPIRIN 81 MG/1
81 TABLET, CHEWABLE ORAL DAILY
Status: DISCONTINUED | OUTPATIENT
Start: 2018-11-01 | End: 2018-11-04 | Stop reason: HOSPADM

## 2018-11-01 RX ADMIN — PERFLUTREN 8.48 MG: 6.52 INJECTION, SUSPENSION INTRAVENOUS at 19:34

## 2018-11-01 RX ADMIN — Medication 3 ML: at 20:29

## 2018-11-01 RX ADMIN — INSULIN DETEMIR 40 UNITS: 100 INJECTION, SOLUTION SUBCUTANEOUS at 20:28

## 2018-11-01 RX ADMIN — CARBIDOPA AND LEVODOPA 2 TABLET: 25; 100 TABLET ORAL at 20:27

## 2018-11-01 RX ADMIN — HYDRALAZINE HYDROCHLORIDE 25 MG: 25 TABLET, FILM COATED ORAL at 20:27

## 2018-11-01 RX ADMIN — LOSARTAN POTASSIUM 100 MG: 50 TABLET ORAL at 20:27

## 2018-11-01 RX ADMIN — CLOPIDOGREL 75 MG: 75 TABLET, FILM COATED ORAL at 20:27

## 2018-11-01 RX ADMIN — GABAPENTIN 300 MG: 300 CAPSULE ORAL at 20:27

## 2018-11-01 RX ADMIN — SODIUM CHLORIDE 100 ML/HR: 9 INJECTION, SOLUTION INTRAVENOUS at 20:01

## 2018-11-01 RX ADMIN — INSULIN LISPRO 2 UNITS: 100 INJECTION, SOLUTION INTRAVENOUS; SUBCUTANEOUS at 20:28

## 2018-11-01 RX ADMIN — TRAZODONE HYDROCHLORIDE 150 MG: 150 TABLET, FILM COATED ORAL at 20:27

## 2018-11-01 RX ADMIN — ESCITALOPRAM 20 MG: 10 TABLET, FILM COATED ORAL at 20:27

## 2018-11-01 RX ADMIN — DESMOPRESSIN ACETATE 80 MG: 0.2 TABLET ORAL at 20:27

## 2018-11-01 RX ADMIN — ASPIRIN 81 MG CHEWABLE TABLET 81 MG: 81 TABLET CHEWABLE at 20:27

## 2018-11-01 RX ADMIN — ENOXAPARIN SODIUM 100 MG: 100 INJECTION SUBCUTANEOUS at 22:10

## 2018-11-01 NOTE — H&P
"    [facility] Medicine Services  HISTORY AND PHYSICAL    Date of Admission: 11/1/2018  Primary Care Physician: Sergio Ma MD    Subjective     Chief Complaint: Transfer for new diagnosis bilateral pulmonary emboli    History of Present Illness  Angelika Sapp is a 78-year-old  male with a past medical history of right lacunar infarction with extension into the right paraventricular hemisphere 8/11/2018-with hemiplegia now wheelchair bound, insulin-dependent diabetes, chronic kidney disease stage III, hyperlipidemia, hypertension, Parkinson's disease.  Patient was discharged from this facility on 8/14/2018-[address] [place] [address] nursing home subsequently transferred to [facility] in [facility].  Patient was working with physical therapy today when he had a syncopal episode complaining of dizziness and \"almost passed out\".  Information obtained from transfer documentation.  Currently patient states he has no memory of incident.  Initial room air saturation was 87%.  This increased to 95% on 2 L.  Patient was transferred to [facility] where workup revealed bilateral pulmonary emboli.  Patient has subsequently been transferred to [facility] for higher level of care.  Currently he is using oxygen and has no complaints of shortness of breathing.  He is alert and oriented.  He denies chest pain or abdominal pain.  He states he has chronic tremors of right upper extremity from his Parkinson's disease.  Patient does have multiple areas of skin changes secondary to pressure.  He is without distress.  He is admitted for further evaluation and treatment.    Review of Systems   A 10 point review of systems was completed, all negative except for those discussed in HPI    Past Medical History:   Past Medical History:   Diagnosis Date   • Arthritis    • Chronic kidney disease (CKD), stage III (moderate) (CMS/HCC)    • CVA (cerebral vascular accident) " (CMS/HCC)     Right lacunar infarction with extension into right paraventricular hemisphere   • Depressive disorder    • GERD (gastroesophageal reflux disease)    • Hemiplegia (CMS/HCC)    • Hyperlipidemia    • Hypertension    • Insulin dependent diabetes mellitus (CMS/HCC)    • Parkinson's disease (CMS/HCC)        Past Surgical History: No past surgical history on file.    Family History: family history includes Alcohol abuse in his father; Alzheimer's disease in his mother.    Social History:  reports that he has never smoked. He has quit using smokeless tobacco. His smokeless tobacco use included Chew. He reports that he does not drink alcohol or use drugs.    Code Status: Full, if unable speak for himself his family will speak for him      Allergies:  No Known Allergies    Medications:  Prior to Admission medications    Medication Sig Start Date End Date Taking? Authorizing Provider   aspirin 81 MG chewable tablet Chew 81 mg Daily.    Denise Smith MD   atorvastatin (LIPITOR) 80 MG tablet Take 1 tablet by mouth Every Night. 8/14/18   Mick Francisco APRN   carbidopa-levodopa (SINEMET)  MG per tablet Take 2 tablets by mouth 3 (Three) Times a Day.    Denise Smith MD   clopidogrel (PLAVIX) 75 MG tablet Take 75 mg by mouth Daily.    Denise Smith MD   escitalopram (LEXAPRO) 20 MG tablet Take 20 mg by mouth Daily.    Denise Smith MD   fenofibrate (TRIGLIDE) 160 MG tablet Take 160 mg by mouth Every Night.    Denise Smith MD   hydrALAZINE (APRESOLINE) 25 MG tablet Take 25 mg by mouth 2 (Two) Times a Day.    Denise Smith MD   insulin detemir (LEVEMIR) 100 UNIT/ML injection Inject 30 Units under the skin into the appropriate area as directed Every Night.    Denise Smith MD   insulin glulisine (APIDRA) 100 UNIT/ML injection Inject 35 Units under the skin into the appropriate area as directed Daily With Breakfast.    Denise Smith MD   olmesartan  "(BENICAR) 20 MG tablet Take 40 mg by mouth Daily.    Provider, Denise, MD   pantoprazole (PROTONIX) 40 MG EC tablet Take 40 mg by mouth Daily.    Provider, MD Denise   traZODone (DESYREL) 150 MG tablet Take 150 mg by mouth Every Night.    Provider, MD Denise   vitamin B-12 (CYANOCOBALAMIN) 250 MCG tablet Take 1 tablet by mouth Daily. 8/19/18   Mick Francisco, APRN       Objective     /94 (BP Location: Left arm, Patient Position: Lying)   Pulse 79   Temp 97.9 °F (36.6 °C) (Oral)   Resp 17   Ht 175.3 cm (69\")   Wt 97 kg (213 lb 14.4 oz)   SpO2 94%   BMI 31.59 kg/m²   Physical Exam   Constitutional: He is oriented to person, place, and time. He appears well-developed and well-nourished. No distress.   HENT:   Head: Normocephalic and atraumatic.   Eyes: Pupils are equal, round, and reactive to light. Conjunctivae and EOM are normal. No scleral icterus.   Neck: Normal range of motion. Neck supple. No JVD present. No tracheal deviation present.   Cardiovascular: Normal rate, regular rhythm, normal heart sounds and intact distal pulses.  Exam reveals no gallop.    No murmur heard.  Pulmonary/Chest: Effort normal and breath sounds normal. No respiratory distress. He has no wheezes. He has no rales.   Abdominal: Soft. Bowel sounds are normal. He exhibits no distension. There is no tenderness. There is no guarding.   Protuberant   Musculoskeletal: He exhibits no edema.   Lower extremity weakness, w/c bound secondary to Parkinson's and stroke   Neurological: He is alert and oriented to person, place, and time.   Tremor left upper extremity   Skin: Skin is warm and dry. No rash noted. He is not diaphoretic. No erythema. No pallor.   Bilateral heel discoloration - purplish; right blister open, left blister intact; coccyx pressure skin changes - purplish in color, minimally blanchable    Psychiatric: He has a normal mood and affect. His behavior is normal.   Vitals reviewed.      Pertinent Data: " Studies obtained at [facility]   EKG revealed normal sinus rhythm at 87 with a first-degree heart block    Chest x-ray and negative for acute real pulmonary findings  CT of the head revealed old stroke no acute findings  CT of the chest report not available, will report bilateral pulmonary emboli    WBC 11.1, hemoglobin 12.6, hematocrit 34.8, platelet count 2 91,000, MCV 98.7    Sodium 136, potassium 5.5, chloride 107, CO2 23, BUN 28, creatinine 1.64, glucose 243, calcium 8.1, magnesium 1.9    Troponin 0.23, , d-dimer 3644 (499)      Assessment / Plan     Assessment:   Bilateral pulmonary emboli  CVA right lacunar with left hemiparesis and wheelchair dependence  Insulin dependent diabetes  Parkinson's disease  Chronic kidney disease stage III with worsening creatinine secondary to dehydration  Hypertension  Near syncope with exertion-room air oxygen saturation 87%    Plan:   1.  Admit as inpatient  2.  Change heparin drip to therapeutic Lovenox  3.  Stat echocardiogram  4.  Bilateral lower extremity Doppler studies in a.m.  5.  Home medications reviewed and restarted as appropriate  6.  Consult wound nurse  7.  Monitor glucose before meals and at bedtime, continue home Levemir dose, regular insulin sliding scale as needed  8.  Hydrate with normal saline 100 ML/hour  9.  Consult PT and OT for ongoing physical therapy secondary to stroke    I discussed the patient's findings and my recommendations with: Lito Odom MD  Time spent: 45 minutes      BRENNA Mckoy  11/01/18   6:47 PM     I personally evaluated and examined the patient in conjunction with BRENNA yAala and agree with the assessment, treatment plan, and disposition of the patient as recorded by her. My history, exam, and further recommendations are: I have reviewed and agree with the plans. Kt.         Lito Odom MD  11/02/18  5:56 PM

## 2018-11-02 ENCOUNTER — APPOINTMENT (OUTPATIENT)
Dept: ULTRASOUND IMAGING | Facility: HOSPITAL | Age: 78
End: 2018-11-02

## 2018-11-02 LAB
ALBUMIN SERPL-MCNC: 2.9 G/DL (ref 3.5–5)
ALBUMIN/GLOB SERPL: 1.2 G/DL (ref 1.1–2.5)
ALP SERPL-CCNC: 47 U/L (ref 24–120)
ALT SERPL W P-5'-P-CCNC: <15 U/L (ref 0–54)
ANION GAP SERPL CALCULATED.3IONS-SCNC: 8 MMOL/L (ref 4–13)
ARTICHOKE IGE QN: 81 MG/DL (ref 0–99)
AST SERPL-CCNC: 24 U/L (ref 7–45)
BASOPHILS # BLD AUTO: 0.03 10*3/MM3 (ref 0–0.2)
BASOPHILS NFR BLD AUTO: 0.5 % (ref 0–2)
BH CV ECHO MEAS - AO MAX PG (FULL): 2.4 MMHG
BH CV ECHO MEAS - AO MAX PG: 4 MMHG
BH CV ECHO MEAS - AO MEAN PG (FULL): 1 MMHG
BH CV ECHO MEAS - AO MEAN PG: 2 MMHG
BH CV ECHO MEAS - AO V2 MAX: 100 CM/SEC
BH CV ECHO MEAS - AO V2 MEAN: 72.6 CM/SEC
BH CV ECHO MEAS - AO V2 VTI: 21.7 CM
BH CV ECHO MEAS - BSA(HAYCOCK): 2.2 M^2
BH CV ECHO MEAS - BSA: 2.1 M^2
BH CV ECHO MEAS - BZI_BMI: 31.5 KILOGRAMS/M^2
BH CV ECHO MEAS - BZI_METRIC_HEIGHT: 175.3 CM
BH CV ECHO MEAS - BZI_METRIC_WEIGHT: 96.6 KG
BH CV ECHO MEAS - EDV(CUBED): 58 ML
BH CV ECHO MEAS - EDV(MOD-SP4): 104 ML
BH CV ECHO MEAS - EDV(TEICH): 64.7 ML
BH CV ECHO MEAS - EF(CUBED): 82.9 %
BH CV ECHO MEAS - EF(MOD-SP4): 63.3 %
BH CV ECHO MEAS - EF(TEICH): 76.4 %
BH CV ECHO MEAS - ESV(CUBED): 9.9 ML
BH CV ECHO MEAS - ESV(MOD-SP4): 38.2 ML
BH CV ECHO MEAS - ESV(TEICH): 15.3 ML
BH CV ECHO MEAS - FS: 44.4 %
BH CV ECHO MEAS - IVS/LVPW: 1.1
BH CV ECHO MEAS - IVSD: 1.4 CM
BH CV ECHO MEAS - LA DIMENSION: 3 CM
BH CV ECHO MEAS - LV DIASTOLIC VOL/BSA (35-75): 49 ML/M^2
BH CV ECHO MEAS - LV MASS(C)D: 170.5 GRAMS
BH CV ECHO MEAS - LV MASS(C)DI: 80.4 GRAMS/M^2
BH CV ECHO MEAS - LV MAX PG: 1.6 MMHG
BH CV ECHO MEAS - LV MEAN PG: 1 MMHG
BH CV ECHO MEAS - LV SYSTOLIC VOL/BSA (12-30): 18 ML/M^2
BH CV ECHO MEAS - LV V1 MAX: 62.8 CM/SEC
BH CV ECHO MEAS - LV V1 MEAN: 42.9 CM/SEC
BH CV ECHO MEAS - LV V1 VTI: 15.3 CM
BH CV ECHO MEAS - LVIDD: 3.9 CM
BH CV ECHO MEAS - LVIDS: 2.2 CM
BH CV ECHO MEAS - LVLD AP4: 8.3 CM
BH CV ECHO MEAS - LVLS AP4: 6.1 CM
BH CV ECHO MEAS - LVPWD: 1.2 CM
BH CV ECHO MEAS - RAP SYSTOLE: 5 MMHG
BH CV ECHO MEAS - RVDD: 3.5 CM
BH CV ECHO MEAS - RVSP: 38.6 MMHG
BH CV ECHO MEAS - SI(CUBED): 22.6 ML/M^2
BH CV ECHO MEAS - SI(MOD-SP4): 31 ML/M^2
BH CV ECHO MEAS - SI(TEICH): 23.3 ML/M^2
BH CV ECHO MEAS - SV(CUBED): 48 ML
BH CV ECHO MEAS - SV(MOD-SP4): 65.8 ML
BH CV ECHO MEAS - SV(TEICH): 49.4 ML
BH CV ECHO MEAS - TR MAX VEL: 290 CM/SEC
BILIRUB SERPL-MCNC: 0.3 MG/DL (ref 0.1–1)
BUN BLD-MCNC: 27 MG/DL (ref 5–21)
BUN/CREAT SERPL: 20 (ref 7–25)
CALCIUM SPEC-SCNC: 8.2 MG/DL (ref 8.4–10.4)
CHLORIDE SERPL-SCNC: 107 MMOL/L (ref 98–110)
CHOLEST SERPL-MCNC: 112 MG/DL (ref 130–200)
CO2 SERPL-SCNC: 22 MMOL/L (ref 24–31)
CREAT BLD-MCNC: 1.35 MG/DL (ref 0.5–1.4)
DEPRECATED RDW RBC AUTO: 49.1 FL (ref 40–54)
EOSINOPHIL # BLD AUTO: 0.27 10*3/MM3 (ref 0–0.7)
EOSINOPHIL NFR BLD AUTO: 4.3 % (ref 0–4)
ERYTHROCYTE [DISTWIDTH] IN BLOOD BY AUTOMATED COUNT: 14 % (ref 12–15)
GFR SERPL CREATININE-BSD FRML MDRD: 51 ML/MIN/1.73
GLOBULIN UR ELPH-MCNC: 2.5 GM/DL
GLUCOSE BLD-MCNC: 218 MG/DL (ref 70–100)
GLUCOSE BLDC GLUCOMTR-MCNC: 144 MG/DL (ref 70–130)
GLUCOSE BLDC GLUCOMTR-MCNC: 159 MG/DL (ref 70–130)
GLUCOSE BLDC GLUCOMTR-MCNC: 173 MG/DL (ref 70–130)
GLUCOSE BLDC GLUCOMTR-MCNC: 175 MG/DL (ref 70–130)
HBA1C MFR BLD: 8.1 %
HCT VFR BLD AUTO: 32.4 % (ref 40–52)
HDLC SERPL-MCNC: 22 MG/DL
HGB BLD-MCNC: 10.6 G/DL (ref 14–18)
IMM GRANULOCYTES # BLD: 0.03 10*3/MM3 (ref 0–0.03)
IMM GRANULOCYTES NFR BLD: 0.5 % (ref 0–5)
LDLC/HDLC SERPL: 2.11 {RATIO}
LV EF 2D ECHO EST: 65 %
LYMPHOCYTES # BLD AUTO: 1.87 10*3/MM3 (ref 0.72–4.86)
LYMPHOCYTES NFR BLD AUTO: 29.6 % (ref 15–45)
MAXIMAL PREDICTED HEART RATE: 140 BPM
MCH RBC QN AUTO: 31.5 PG (ref 28–32)
MCHC RBC AUTO-ENTMCNC: 32.7 G/DL (ref 33–36)
MCV RBC AUTO: 96.1 FL (ref 82–95)
MONOCYTES # BLD AUTO: 0.64 10*3/MM3 (ref 0.19–1.3)
MONOCYTES NFR BLD AUTO: 10.1 % (ref 4–12)
NEUTROPHILS # BLD AUTO: 3.48 10*3/MM3 (ref 1.87–8.4)
NEUTROPHILS NFR BLD AUTO: 55 % (ref 39–78)
NRBC BLD MANUAL-RTO: 0 /100 WBC (ref 0–0)
PLATELET # BLD AUTO: 255 10*3/MM3 (ref 130–400)
PMV BLD AUTO: 9.5 FL (ref 6–12)
POTASSIUM BLD-SCNC: 4.3 MMOL/L (ref 3.5–5.3)
PROT SERPL-MCNC: 5.4 G/DL (ref 6.3–8.7)
RBC # BLD AUTO: 3.37 10*6/MM3 (ref 4.8–5.9)
SODIUM BLD-SCNC: 137 MMOL/L (ref 135–145)
STRESS TARGET HR: 119 BPM
TRIGL SERPL-MCNC: 218 MG/DL (ref 0–149)
TROPONIN I SERPL-MCNC: 0.65 NG/ML (ref 0–0.03)
TSH SERPL DL<=0.05 MIU/L-ACNC: 3.32 MIU/ML (ref 0.47–4.68)
WBC NRBC COR # BLD: 6.32 10*3/MM3 (ref 4.8–10.8)

## 2018-11-02 PROCEDURE — 94760 N-INVAS EAR/PLS OXIMETRY 1: CPT

## 2018-11-02 PROCEDURE — G8987 SELF CARE CURRENT STATUS: HCPCS

## 2018-11-02 PROCEDURE — 80053 COMPREHEN METABOLIC PANEL: CPT | Performed by: NURSE PRACTITIONER

## 2018-11-02 PROCEDURE — 85025 COMPLETE CBC W/AUTO DIFF WBC: CPT | Performed by: NURSE PRACTITIONER

## 2018-11-02 PROCEDURE — 94799 UNLISTED PULMONARY SVC/PX: CPT

## 2018-11-02 PROCEDURE — 83036 HEMOGLOBIN GLYCOSYLATED A1C: CPT | Performed by: NURSE PRACTITIONER

## 2018-11-02 PROCEDURE — 63710000001 INSULIN DETEMIR PER 5 UNITS: Performed by: NURSE PRACTITIONER

## 2018-11-02 PROCEDURE — 93970 EXTREMITY STUDY: CPT

## 2018-11-02 PROCEDURE — 82962 GLUCOSE BLOOD TEST: CPT

## 2018-11-02 PROCEDURE — 99222 1ST HOSP IP/OBS MODERATE 55: CPT | Performed by: INTERNAL MEDICINE

## 2018-11-02 PROCEDURE — G8988 SELF CARE GOAL STATUS: HCPCS

## 2018-11-02 PROCEDURE — 63710000001 INSULIN LISPRO (HUMAN) PER 5 UNITS: Performed by: NURSE PRACTITIONER

## 2018-11-02 PROCEDURE — 84443 ASSAY THYROID STIM HORMONE: CPT | Performed by: NURSE PRACTITIONER

## 2018-11-02 PROCEDURE — 80061 LIPID PANEL: CPT | Performed by: NURSE PRACTITIONER

## 2018-11-02 PROCEDURE — 25010000002 ENOXAPARIN PER 10 MG: Performed by: NURSE PRACTITIONER

## 2018-11-02 PROCEDURE — 97166 OT EVAL MOD COMPLEX 45 MIN: CPT

## 2018-11-02 PROCEDURE — 93970 EXTREMITY STUDY: CPT | Performed by: SURGERY

## 2018-11-02 PROCEDURE — 84484 ASSAY OF TROPONIN QUANT: CPT | Performed by: NURSE PRACTITIONER

## 2018-11-02 RX ADMIN — GABAPENTIN 300 MG: 300 CAPSULE ORAL at 20:04

## 2018-11-02 RX ADMIN — LOSARTAN POTASSIUM 100 MG: 50 TABLET ORAL at 08:15

## 2018-11-02 RX ADMIN — INSULIN LISPRO 2 UNITS: 100 INJECTION, SOLUTION INTRAVENOUS; SUBCUTANEOUS at 17:20

## 2018-11-02 RX ADMIN — CLOPIDOGREL 75 MG: 75 TABLET, FILM COATED ORAL at 08:15

## 2018-11-02 RX ADMIN — SODIUM CHLORIDE 100 ML/HR: 9 INJECTION, SOLUTION INTRAVENOUS at 05:06

## 2018-11-02 RX ADMIN — ESCITALOPRAM 20 MG: 10 TABLET, FILM COATED ORAL at 08:15

## 2018-11-02 RX ADMIN — ASPIRIN 81 MG CHEWABLE TABLET 81 MG: 81 TABLET CHEWABLE at 08:15

## 2018-11-02 RX ADMIN — CARBIDOPA AND LEVODOPA 2 TABLET: 25; 100 TABLET ORAL at 16:50

## 2018-11-02 RX ADMIN — HYDRALAZINE HYDROCHLORIDE 25 MG: 25 TABLET, FILM COATED ORAL at 20:03

## 2018-11-02 RX ADMIN — ENOXAPARIN SODIUM 100 MG: 100 INJECTION SUBCUTANEOUS at 17:20

## 2018-11-02 RX ADMIN — INSULIN DETEMIR 40 UNITS: 100 INJECTION, SOLUTION SUBCUTANEOUS at 20:04

## 2018-11-02 RX ADMIN — TRAZODONE HYDROCHLORIDE 150 MG: 150 TABLET, FILM COATED ORAL at 20:04

## 2018-11-02 RX ADMIN — CARBIDOPA AND LEVODOPA 2 TABLET: 25; 100 TABLET ORAL at 20:03

## 2018-11-02 RX ADMIN — ENOXAPARIN SODIUM 100 MG: 100 INJECTION SUBCUTANEOUS at 08:15

## 2018-11-02 RX ADMIN — INSULIN LISPRO 2 UNITS: 100 INJECTION, SOLUTION INTRAVENOUS; SUBCUTANEOUS at 20:04

## 2018-11-02 RX ADMIN — HYDRALAZINE HYDROCHLORIDE 25 MG: 25 TABLET, FILM COATED ORAL at 09:06

## 2018-11-02 RX ADMIN — CARBIDOPA AND LEVODOPA 2 TABLET: 25; 100 TABLET ORAL at 08:15

## 2018-11-02 RX ADMIN — PANTOPRAZOLE SODIUM 40 MG: 40 TABLET, DELAYED RELEASE ORAL at 06:09

## 2018-11-02 RX ADMIN — DESMOPRESSIN ACETATE 80 MG: 0.2 TABLET ORAL at 20:03

## 2018-11-02 RX ADMIN — Medication 3 ML: at 20:07

## 2018-11-02 NOTE — PLAN OF CARE
Problem: Patient Care Overview  Goal: Plan of Care Review  Outcome: Ongoing (interventions implemented as appropriate)   11/02/18 1504   Coping/Psychosocial   Plan of Care Reviewed With patient   Plan of Care Review   Progress no change   OTHER   Outcome Summary OT eval completed. Pt s/p CVA with residual L sided hemiparesis. Pt reports he has been standing with PT at [facility] but unable to take steps yet. Reports receiving modA for most UB ADL, maxA LB ADL. At this time pt completes grooming with intentional tremor noted in R hand, S and set up required. Rolls to L with vc and modA with use of bed rail. Skilled OT required to address ADL retraining, positioning/PROM LUE, and increase mobility. OT recommends return to skilled rehab upon d/c.

## 2018-11-02 NOTE — NURSING NOTE
Pt awake and alert.  Family in room.  Large broken blister on (L) heel.  Wound base red. Marlys skin dark pink.  Serious drng.  No odor.  Offload heel and foam drsg for protection.  (R) heel with peeling area and dry skin.  Off load and foam drsg.  Bilat buttocks with Moisture assoc skin damage.  Area on (R) buttocks denuded.  Skin cleansed and Z-Guard applied.

## 2018-11-02 NOTE — CONSULTS
[facility], [facility] Consult Note    Referring Provider: Angi Chin  Reason for Consultation: Bilateral PE, EKOS consideration    Patient Care Team:  Sergio Ma MD as PCP - General (General Practice)    Chief complaint SOB, near syncope    Subjective .     History of present illness:  This patient is a 79 y/o wm with recent h/o CVA and left hemiparesis and wheelchair dependence.  He became acutely SOB yesterday and had near syncope.  He presented to [facility] and was found to have bilateral pulmonary emboli and ultimately transferred here for further evaluation and treatment.  Upon interview, he relays he feels better on O2 and denies CP.  He denies prior h/o blood clot.    Review of Systems  A 10-point review of systems is obtained and negative except for otherwise mentioned above.    History  Past Medical History:   Diagnosis Date   • Arthritis    • Chronic kidney disease (CKD), stage III (moderate) (CMS/HCC)    • CVA (cerebral vascular accident) (CMS/[facility])     Right lacunar infarction with extension into right paraventricular hemisphere   • Depressive disorder    • GERD (gastroesophageal reflux disease)    • Hemiplegia (CMS/[facility])    • Hyperlipidemia    • Hypertension    • Insulin dependent diabetes mellitus (CMS/HCC)    • Parkinson's disease (CMS/HCC)    , No past surgical history on file., Family History   Problem Relation Age of Onset   • Alzheimer's disease Mother    • Alcohol abuse Father    , Social History   Substance Use Topics   • Smoking status: Never Smoker   • Smokeless tobacco: Former User     Types: Chew   • Alcohol use No   , Prescriptions Prior to Admission   Medication Sig Dispense Refill Last Dose   • aspirin 81 MG chewable tablet Chew 81 mg Daily.      • atorvastatin (LIPITOR) 80 MG tablet Take 1 tablet by mouth Every Night.      • carbidopa-levodopa (SINEMET)  MG per tablet Take 2 tablets by mouth 3 (Three) Times a Day.   Past Week at Unknown  "time   • clopidogrel (PLAVIX) 75 MG tablet Take 75 mg by mouth Daily.   Past Week at Unknown time   • escitalopram (LEXAPRO) 20 MG tablet Take 20 mg by mouth Daily.   Past Week at Unknown time   • fenofibrate (TRIGLIDE) 160 MG tablet Take 160 mg by mouth Every Night.   Past Week at Unknown time   • gabapentin (NEURONTIN) 300 MG capsule Take 300 mg by mouth Every Night.      • hydrALAZINE (APRESOLINE) 25 MG tablet Take 25 mg by mouth 2 (Two) Times a Day.   Past Week at Unknown time   • insulin detemir (LEVEMIR) 100 UNIT/ML injection Inject 40 Units under the skin into the appropriate area as directed Every Night.   Past Week at Unknown time   • insulin glulisine (APIDRA) 100 UNIT/ML injection Inject 35 Units under the skin into the appropriate area as directed Daily With Breakfast.   Past Week at Unknown time   • olmesartan (BENICAR) 20 MG tablet Take 40 mg by mouth Daily.   Past Week at Unknown time   • pantoprazole (PROTONIX) 40 MG EC tablet Take 40 mg by mouth Daily.   Past Week at Unknown time   • traZODone (DESYREL) 150 MG tablet Take 150 mg by mouth Every Night.   Past Week at Unknown time   • vitamin B-12 (CYANOCOBALAMIN) 250 MCG tablet Take 1 tablet by mouth Daily. 30 tablet      and Allergies:  Patient has no known allergies.    Objective     Vital Sign Min/Max for last 24 hours  Temp  Min: 97.9 °F (36.6 °C)  Max: 98.2 °F (36.8 °C)   BP  Min: 80/53  Max: 165/94   Pulse  Min: 55  Max: 84   Resp  Min: 12  Max: 21   SpO2  Min: 94 %  Max: 99 %   No Data Recorded   Weight  Min: 97 kg (213 lb 14.4 oz)  Max: 97.8 kg (215 lb 11.2 oz)     Flowsheet Rows      First Filed Value   Admission Height  175.3 cm (69\") Documented at 11/01/2018 1800   Admission Weight  97 kg (213 lb 14.4 oz) Documented at 11/01/2018 1800             Echo EF Estimated  Lab Results   Component Value Date    ECHOEFEST 65 11/01/2018     Physical Exam:     General Appearance:    Alert, cooperative, in no acute distress   Head:    Normocephalic, " without obvious abnormality, atraumatic   Eyes:            Lids and lashes normal, conjunctivae and sclerae normal, no   icterus, PERRLA, EOMI   Throat:   Oral mucosa pink and moist   Neck:   No adenopathy, supple, trachea midline, no thyromegaly, no   carotid bruit, no JVD   Lungs:     Clear to auscultation bilaterally,respirations regular, even     and unlabored    Heart:    Regular rhythm and normal rate, normal S1 and S2, no            murmur, no gallop, no rub, no click   Chest Wall:    No abnormalities observed   Abdomen:     Normal bowel sounds present in all four quadrants, no       masses, no organomegaly, soft non-tender, non-distended    Extremities:   1+ pitting edema BLE, L>R, no cyanosis, no clubbing   Pulses:   Pulses palpable and equal bilaterally   Skin:   No bleeding, bruising or rash   Psychiatric:   Displays appropriate mood and affect           Results Review:    I reviewed the patient's new clinical results.      Results from last 7 days  Lab Units 11/02/18 0134   WBC 10*3/mm3 6.32   HEMOGLOBIN g/dL 10.6*   HEMATOCRIT % 32.4*   PLATELETS 10*3/mm3 255       Results from last 7 days  Lab Units 11/02/18 0134   SODIUM mmol/L 137   POTASSIUM mmol/L 4.3   CHLORIDE mmol/L 107   CO2 mmol/L 22.0*   BUN mg/dL 27*   CREATININE mg/dL 1.35   GLUCOSE mg/dL 218*   CALCIUM mg/dL 8.2*       Results from last 7 days  Lab Units 11/02/18 0134   SODIUM mmol/L 137   POTASSIUM mmol/L 4.3   CHLORIDE mmol/L 107   CO2 mmol/L 22.0*   BUN mg/dL 27*   CREATININE mg/dL 1.35   CALCIUM mg/dL 8.2*   BILIRUBIN mg/dL 0.3   ALK PHOS U/L 47   ALT (SGPT) U/L <15   AST (SGOT) U/L 24   GLUCOSE mg/dL 218*       Results from last 7 days  Lab Units 11/02/18 0134 11/01/18 2015   TROPONIN I ng/mL 0.653* 0.608*       Results from last 7 days  Lab Units 11/02/18 0134   TSH mIU/mL 3.320       Results from last 7 days  Lab Units 11/02/18 0134   CHOLESTEROL mg/dL 112*   TRIGLYCERIDES mg/dL 218*   HDL CHOL mg/dL 22*   LDL CHOL mg/dL 81        Assessment/Plan       Bilateral pulmonary embolism (CMS/HCC)  Elevated troponin, secondary to above  RV dysfunction secondary to number 1  Recent CVA  T2DM  Parkinson's dz  CKD III  HTN    This patient is not a candidate for EKOS procedure d/t recent CVA and being 77 y/o.  Agree with checking LE doppler.  If significant clot burden is present, consider IVC filter and continue with full dose anticoagulation.  Further recommendations to follow from Dr. Martin.  Thanks for consultation.  Cardiology will gladly follow with you.    I discussed the patient's findings and my recommendations with patient and consulting provider    Zayda Prado PA-C  11/02/18  10:25 AM

## 2018-11-02 NOTE — PLAN OF CARE
Problem: Patient Care Overview  Goal: Plan of Care Review  Outcome: Ongoing (interventions implemented as appropriate)   11/01/18 7140   Coping/Psychosocial   Plan of Care Reviewed With patient;spouse   Plan of Care Review   Progress no change   OTHER   Outcome Summary Pt received from [facility] with Bilateral PE, pt was working with PT in [facility] when pt experienced syncopal episode and sent to [facility] for evaluation. Pt arrived to ICU at 1755 and is neurologicaly intact and in no respiratory distress, on 2 LNC but reports no SOA or difficulty breathing. Pt came on Heparin gtt and will be switched to theraputic lovenox. Pt also presented with bilateral heel pressure injuries and DTI on coccyx.

## 2018-11-02 NOTE — PROGRESS NOTES
[facility] Medicine Services  INPATIENT PROGRESS NOTE    Length of Stay: 1  Date of Admission: 11/1/2018  Primary Care Physician: Sergio Ma MD    Subjective   Chief Complaint: Bilateral pulmonary embolus/possible right heart strain/chronic renal failure/diabetes/recent stroke.    HPI   Patient denies any chest pain.  Patient denies any shortness of breath.  Patient tolerated diet.  Echocardiogram result discussed with family.  Plan for cardiology consult.  Discussed patient bleeding risk being on 3 blood thinner.  Patient had a CVA in August with little improvement on the left side.    Review of Systems   Constitutional: Positive for activity change, appetite change and fatigue. Negative for chills and fever.   HENT: Negative for hearing loss, nosebleeds, tinnitus and trouble swallowing.    Eyes: Negative for visual disturbance.   Respiratory: Negative for cough, chest tightness, shortness of breath and wheezing.    Cardiovascular: Negative for chest pain, palpitations and leg swelling.   Gastrointestinal: Negative for abdominal distention, abdominal pain, blood in stool, constipation, diarrhea, nausea and vomiting.   Endocrine: Negative for cold intolerance, heat intolerance, polydipsia, polyphagia and polyuria.   Genitourinary: Negative for decreased urine volume, difficulty urinating, dysuria, flank pain, frequency and hematuria.   Musculoskeletal: Positive for arthralgias, gait problem and myalgias. Negative for joint swelling.   Skin: Negative for rash.   Allergic/Immunologic: Negative for immunocompromised state.   Neurological: Positive for weakness. Negative for dizziness, syncope, light-headedness and headaches.   Hematological: Negative for adenopathy. Does not bruise/bleed easily.   Psychiatric/Behavioral: Negative for confusion and sleep disturbance. The patient is not nervous/anxious.           All pertinent negatives and positives are as above. All other  systems have been reviewed and are negative unless otherwise stated.     Objective    Temp:  [97.9 °F (36.6 °C)-98.2 °F (36.8 °C)] 98.2 °F (36.8 °C)  Heart Rate:  [55-84] 55  Resp:  [12-21] 12  BP: ()/(50-97) 88/59    Intake/Output Summary (Last 24 hours) at 11/02/18 0753  Last data filed at 11/02/18 0506   Gross per 24 hour   Intake           1043.1 ml   Output              675 ml   Net            368.1 ml     Physical Exam   Constitutional: He is oriented to person, place, and time. He appears well-developed and well-nourished.   HENT:   Head: Normocephalic and atraumatic.   Eyes: Pupils are equal, round, and reactive to light. Conjunctivae and EOM are normal.   Neck: Neck supple. No JVD present. No thyromegaly present.   Cardiovascular: Normal rate, regular rhythm, normal heart sounds and intact distal pulses.  Exam reveals no gallop and no friction rub.    No murmur heard.  Pulmonary/Chest: Effort normal and breath sounds normal. No respiratory distress. He has no wheezes. He has no rales. He exhibits no tenderness.   Decrease in breath sound bilateral.   Abdominal: Soft. Bowel sounds are normal. He exhibits no distension. There is no tenderness. There is no rebound and no guarding.   Musculoskeletal: He exhibits edema (trace to +1 edema, left lower extremities). He exhibits no tenderness or deformity.   Left-sided paralysis.   Lymphadenopathy:     He has no cervical adenopathy.   Neurological: He is alert and oriented to person, place, and time. He displays normal reflexes. No cranial nerve deficit. He exhibits abnormal muscle tone. Coordination abnormal.   Left-sided paralysis   Skin: Skin is warm and dry. Capillary refill takes 2 to 3 seconds. No rash noted. No erythema.   Psychiatric: He has a normal mood and affect. His behavior is normal. Judgment and thought content normal.   Nursing note and vitals reviewed.      Results Review:  Lab Results (last 24 hours)     Procedure Component Value Units  Date/Time    Hemoglobin A1c [[id number]] Collected:  11/02/18 0134    Specimen:  Blood Updated:  11/02/18 0447     Hemoglobin A1C 8.1 %     Narrative:       Less than 6.0           Non-Diabetic Range  6.0-7.0                 ADA Therapeutic Target  Greater than 7.0        Action Suggested    TSH [[id number]]  (Normal) Collected:  11/02/18 0134    Specimen:  Blood Updated:  11/02/18 0227     TSH 3.320 mIU/mL     Troponin [[id number]]  (Abnormal) Collected:  11/02/18 0134    Specimen:  Blood Updated:  11/02/18 0210     Troponin I 0.653 (C) ng/mL     Lipid Panel [[id number]]  (Abnormal) Collected:  11/02/18 0134    Specimen:  Blood Updated:  11/02/18 0207     Total Cholesterol 112 (L) mg/dL      Triglycerides 218 (H) mg/dL      HDL Cholesterol 22 (L) mg/dL      LDL Cholesterol  81 mg/dL      LDL/HDL Ratio 2.11    Comprehensive Metabolic Panel [[id number]]  (Abnormal) Collected:  11/02/18 0134    Specimen:  Blood Updated:  11/02/18 0156     Glucose 218 (H) mg/dL      BUN 27 (H) mg/dL      Creatinine 1.35 mg/dL      Sodium 137 mmol/L      Potassium 4.3 mmol/L      Chloride 107 mmol/L      CO2 22.0 (L) mmol/L      Calcium 8.2 (L) mg/dL      Total Protein 5.4 (L) g/dL      Albumin 2.90 (L) g/dL      ALT (SGPT) <15 U/L      AST (SGOT) 24 U/L      Alkaline Phosphatase 47 U/L      Total Bilirubin 0.3 mg/dL      eGFR Non African Amer 51 (L) mL/min/1.73      Globulin 2.5 gm/dL      A/G Ratio 1.2 g/dL      BUN/Creatinine Ratio 20.0     Anion Gap 8.0 mmol/L     Narrative:       The MDRD GFR formula is only valid for adults with stable renal function between ages 18 and 70.    CBC Auto Differential [[id number]]  (Abnormal) Collected:  11/02/18 0134    Specimen:  Blood Updated:  11/02/18 0145     WBC 6.32 10*3/mm3      RBC 3.37 (L) 10*6/mm3      Hemoglobin 10.6 (L) g/dL      Hematocrit 32.4 (L) %      MCV 96.1 (H) fL      MCH 31.5 pg      MCHC 32.7 (L) g/dL      RDW 14.0 %      RDW-SD 49.1 fl      MPV 9.5 fL      Platelets 255 10*3/mm3       Neutrophil % 55.0 %      Lymphocyte % 29.6 %      Monocyte % 10.1 %      Eosinophil % 4.3 (H) %      Basophil % 0.5 %      Immature Grans % 0.5 %      Neutrophils, Absolute 3.48 10*3/mm3      Lymphocytes, Absolute 1.87 10*3/mm3      Monocytes, Absolute 0.64 10*3/mm3      Eosinophils, Absolute 0.27 10*3/mm3      Basophils, Absolute 0.03 10*3/mm3      Immature Grans, Absolute 0.03 10*3/mm3      nRBC 0.0 /100 WBC     Troponin [[id number]]  (Abnormal) Collected:  11/01/18 2015    Specimen:  Blood Updated:  11/01/18 2049     Troponin I 0.608 (C) ng/mL     Protime-INR [[id number]]  (Normal) Collected:  11/01/18 2015    Specimen:  Blood Updated:  11/01/18 2031     Protime 13.5 Seconds      INR 1.00    POC Glucose Once [[id number]]  (Abnormal) Collected:  11/01/18 2009    Specimen:  Blood Updated:  11/01/18 2020     Glucose 193 (H) mg/dL      Comment: : [id number] [id number] ID: [id number]              Cultures:       Radiology Data:    Imaging Results (last 24 hours)     ** No results found for the last 24 hours. **          No Known Allergies    Scheduled meds:     aspirin 81 mg Oral Daily   atorvastatin 80 mg Oral Nightly   carbidopa-levodopa 2 tablet Oral TID   clopidogrel 75 mg Oral Daily   enoxaparin 1 mg/kg Subcutaneous Q12H   escitalopram 20 mg Oral Daily   gabapentin 300 mg Oral Nightly   hydrALAZINE 25 mg Oral BID   insulin detemir 40 Units Subcutaneous Nightly   insulin lispro 2-9 Units Subcutaneous 4x Daily With Meals & Nightly   losartan 100 mg Oral Q24H   pantoprazole 40 mg Oral Q AM   sodium chloride 3 mL Intravenous Q12H   traZODone 150 mg Oral Nightly       PRN meds:  •  acetaminophen  •  dextrose  •  dextrose  •  glucagon (human recombinant)  •  ipratropium-albuterol  •  ondansetron **OR** ondansetron ODT **OR** ondansetron  •  sodium chloride    Assessment/Plan       Bilateral pulmonary embolism (CMS/HCC)      Plan:  Bilateral pulmonary embolus.  Continue Lovenox.   Bilateral lower  extremity Doppler pending.  Therapeutic Lovenox.  Consult cardiology possible right heart strain.    Near-syncope with exertion/room air O2 sats 87%.  Troponin stable.  Echocardiogram shows ejection fraction 65%, right ventricle cavity is moderately to severely dilated/severe reduction of right ventricular systolic function/concentric hypertrophy.    Diabetes insulin-dependent.  Continue Levemir nightly and sliding scale.  Hemoglobin A1c 8.1.    Parkinsonism.  Continue Sinemet.    Chronic renal failure stage III.      Depression.  Continue Lexapro.  Continue trazodone at night.    Hypertension.  Continue Cozaar.    Neuropathy.  Continue Neurontin.    Anemia.  Anemia panel.  No sign of acute bleed.    History of CVA in august.  Left-sided paralysis.  Continue aspirin, Plavix, and Lipitor.    Deconditioning.  PT and OT consult.    Discharge Planning:  Unknown    Lito Odom MD   11/02/18   7:53 AM

## 2018-11-02 NOTE — THERAPY EVALUATION
Acute Care - Occupational Therapy Initial Evaluation  [facility]     Patient Name: Angelika Sapp  : 1940  MRN: [id number]  Today's Date: 2018  Onset of Illness/Injury or Date of Surgery: 18  Date of Referral to OT: 18  Referring Physician: BRENNA Chin    Admit Date: 2018       ICD-10-CM ICD-9-CM   1. Impaired mobility and ADLs Z74.09 799.89     Patient Active Problem List   Diagnosis   • Cerebrovascular accident (CVA) (CMS/HCC)   • Bilateral pulmonary embolism (CMS/HCC)     Past Medical History:   Diagnosis Date   • Arthritis    • Chronic kidney disease (CKD), stage III (moderate) (CMS/HCC)    • CVA (cerebral vascular accident) (CMS/HCC)     Right lacunar infarction with extension into right paraventricular hemisphere   • Depressive disorder    • GERD (gastroesophageal reflux disease)    • Hemiplegia (CMS/HCC)    • Hyperlipidemia    • Hypertension    • Insulin dependent diabetes mellitus (CMS/HCC)    • Parkinson's disease (CMS/HCC)      History reviewed. No pertinent surgical history.       OT ASSESSMENT FLOWSHEET (last 72 hours)      Occupational Therapy Evaluation     Row Name 18 1335                   OT Evaluation Time/Intention    Subjective Information complains of;numbness   residual from stroke  -MW        Document Type evaluation  -MW        Mode of Treatment occupational therapy  -           General Information    Patient Profile Reviewed? yes  -MW        Onset of Illness/Injury or Date of Surgery 18  -        Referring Physician BRENNA Chin  -        Patient Observations alert;cooperative;agree to therapy  -MW        Patient/Family Observations spouse present at bedside  -        General Observations of Patient awake and alert in fowlers, BUE propped on pillows, heels elevated  -MW        Prior Level of Function mod assist:;max assist:;grooming;dressing;bathing;dependent:;all household mobility   standing with PT  -MW        Equipment Currently Used  at Home --   provided at [facility]        Pertinent History of Current Functional Problem Stroke 8/2018 with residual L sided hemiparesis. DVT in LE, B pulmonary embolus. Hx Parkinsons  -        Existing Precautions/Restrictions fall  -MW        Limitations/Impairments insensate body part   L sided hemiparesis  -        Risks Reviewed patient:;spouse/S.O.:;LOB;nausea/vomiting;dizziness;increased discomfort;change in vital signs;increased drainage;lines disloged  -        Benefits Reviewed patient:;spouse/S.O.:;improve function;increase independence;increase strength;increase balance;decrease pain;decrease risk of DVT;improve skin integrity;increase knowledge  -MW        Barriers to Rehab previous functional deficit;medically complex;physical barrier  -MW           Relationship/Environment    Primary Source of Support/Comfort spouse  -MW        Name(s) of Who Lives With Patient [facility]ian [facility]  [facility]           Resource/Environmental Concerns    Current Living Arrangements extended care facility  -           Cognitive Assessment/Interventions    Additional Documentation Cognitive Assessment/Intervention (Group)  -           Cognitive Assessment/Intervention- PT/OT    Affect/Mental Status (Cognitive) WFL  -        Orientation Status (Cognition) oriented x 4  -MW        Follows Commands (Cognition) WFL  -MW        Cognitive Function (Cognitive) WFL  -MW        Personal Safety Interventions fall prevention program maintained;muscle strengthening facilitated;nonskid shoes/slippers when out of bed;supervised activity  -           Safety Issues, Functional Mobility    Impairments Affecting Function (Mobility) endurance/activity tolerance;pain;grasp;muscle tone abnormal;strength  -MW           Bed Mobility Assessment/Treatment    Bed Mobility Assessment/Treatment rolling left  -MW        Rolling Left [place] (Bed Mobility) moderate assist (50% patient effort)  -        Bed Mobility, Safety Issues  decreased use of arms for pushing/pulling;decreased use of legs for bridging/pushing  -        Assistive Device (Bed Mobility) bed rails  -        Comment (Bed Mobility) multiple cues required for clarity of directions  -           Functional Mobility    Functional Mobility- Comment pt is nonambulatory  -           Transfer Assessment/Treatment    Comment (Transfers) deferred, pt reports he is standing with PT at [facility] but not taking steps  -           ADL Assessment/Intervention    BADL Assessment/Intervention grooming  -           Grooming Assessment/Training    [place] Level (Grooming) hair care, combing/brushing;supervision  -        Grooming Position sitting up in bed  -           BADL Safety/Performance    Impairments, BADL Safety/Performance endurance/activity tolerance;muscle tone abnormality;strength;range of motion  -           General ROM    GENERAL ROM COMMENTS AROM WFL RUE, L no active movement/PROM impaired ~25% at shoulder flexion  -           MMT (Manual Muscle Testing)    General MMT Comments RUE 4-/5, LUE 0/5  -MW           Motor Assessment/Interventions    Muscle Tone Assessment LUE  -        LUE Muscle Tone Assessment mildly increased tone   elbow flexion  -        Additional Documentation Muscle Tone Assessment (Row)  -           Sensory Assessment/Intervention    Sensory General Assessment --   decreased sensation LUE  -           Positioning and Restraints    Pre-Treatment Position in bed  -MW        Post Treatment Position bed  -MW        In Bed fowlers;call light within reach;encouraged to call for assist;patient within staff view;with family/caregiver;side rails up x2;RUE elevated;LUE elevated  -           Pain Assessment    Additional Documentation Pain Scale: FACES Pre/Post-Treatment (Group)  -           Pain Scale: FACES Pre/Post-Treatment    Pain: FACES Scale, Pretreatment 0-->no hurt  -        Pain: FACES Scale, Post-Treatment 0-->no hurt   -MW           Wound 11/01/18 1800 Left heel pressure injury    Wound - Properties Group Date first assessed: 11/01/18  -CH Time first assessed: 1800  -CH Present On Admission : yes  -CH Side: Left  -CH Location: heel  -CH Type: pressure injury  -CH       Wound 11/01/18 1800 Right heel pressure injury    Wound - Properties Group Date first assessed: 11/01/18  -CH Time first assessed: 1800  -CH Present On Admission : yes  -CH Side: Right  -CH Location: heel  -CH Type: pressure injury  -CH       Wound 11/01/18 1800 Bilateral posterior coccyx pressure injury    Wound - Properties Group Date first assessed: 11/01/18  -CH Time first assessed: 1800  -CH Present On Admission : yes  -CH Side: Bilateral  -CH Orientation: posterior  -CH Location: coccyx  -CH Type: pressure injury  -CH Stage, Pressure Injury: deep tissue injury  -CH       Plan of Care Review    Plan of Care Reviewed With patient;spouse  -           Clinical Impression (OT)    Date of Referral to OT 11/01/18  -MW        OT Diagnosis decline in ADL  -MW        Prognosis (OT Eval) good  -        Patient/Family Goals Statement (OT Eval) return to [facility], increase mobility while in hospital  -        Criteria for Skilled Therapeutic Interventions Met (OT Eval) yes;treatment indicated  -        Rehab Potential (OT Eval) good, to achieve stated therapy goals  -        Therapy Frequency (OT Eval) 3 times/wk  -        Predicted Duration of Therapy Intervention (Therapy Eval) until d/c  -MW        Care Plan Review (OT) evaluation/treatment results reviewed;care plan/treatment goals reviewed;risks/benefits reviewed;current/potential barriers reviewed;patient/other agree to care plan  -        Anticipated Discharge Disposition (OT) skilled nursing facility  -           Planned OT Interventions    Planned Therapy Interventions (OT Eval) activity tolerance training;BADL retraining;functional balance retraining;occupation/activity based  interventions;passive ROM/stretching;transfer/mobility retraining  -MW           OT Goals    Transfer Goal Selection (OT) transfer, OT goal 1  -MW        Dressing Goal Selection (OT) dressing, OT goal 1  -MW        Self-Feeding Goal Selection (OT) self feeding, OT goal 1  -MW        Additional Documentation Self-Feeding Goal Selection (OT) (Row)  -MW           Transfer Goal 1 (OT)    Activity/Assistive Device (Transfer Goal 1, OT) bed-to-chair/chair-to-bed;toilet;shower chair;wheelchair transfer  -MW        [place] Level/Cues Needed (Transfer Goal 1, OT) moderate assist (50-74% patient effort)  -MW        Time Frame (Transfer Goal 1, OT) long term goal (LTG);by discharge  -MW        Progress/Outcome (Transfer Goal 1, OT) goal ongoing  -MW           Dressing Goal 1 (OT)    Activity/Assistive Device (Dressing Goal 1, OT) upper body dressing  -MW        [place]/Cues Needed (Dressing Goal 1, OT) moderate assist (50-74% patient effort)  -MW        Time Frame (Dressing Goal 1, OT) long term goal (LTG);by discharge  -MW        Progress/Outcome (Dressing Goal 1, OT) goal ongoing  -MW           Self-Feeding Goal 1 (OT)    Activity/Assistive Device (Self-Feeding Goal 1, OT) self-feeding skills, all  -MW        [place] Level/Cues Needed (Self-Feeding Goal 1, OT) minimum assist (75% or more patient effort)  -MW        Time Frame (Self-Feeding Goal 1, OT) long term goal (LTG);by discharge  -MW        Progress/Outcomes (Self-Feeding Goal 1, OT) goal ongoing  -MW           Living Environment    Home Accessibility wheelchair accessible  -          User Key  (r) = Recorded By, (t) = Taken By, (c) = Cosigned By    Initials Name Effective Dates     Jill Romero RN 08/02/16 -     MW Rhina Mckeon OTR/ENRIQUE 08/28/18 -            Occupational Therapy Education     Title: PT OT SLP Therapies (Done)     Topic: Occupational Therapy (Done)     Point: ADL training (Done)     Description: Instruct learner(s) on  proper safety adaptation and remediation techniques during self care or transfers.   Instruct in proper use of assistive devices.   Learning Progress Summary     Learner Status Readiness Method Response Comment Documented by    Patient Done Acceptance E VU bed mobility, ADL, OT POC  11/02/18 1507    Significant Other Done Acceptance E VU bed mobility, ADL, OT POC  11/02/18 1507                      User Key     Initials Effective Dates Name Provider Type Discipline     08/28/18 -  Rhina Mckeon, OTR/L Occupational Therapist OT                  OT Recommendation and Plan  Outcome Summary/Treatment Plan (OT)  Anticipated Discharge Disposition (OT): skilled nursing facility  Planned Therapy Interventions (OT Eval): activity tolerance training, BADL retraining, functional balance retraining, occupation/activity based interventions, passive ROM/stretching, transfer/mobility retraining  Therapy Frequency (OT Eval): 3 times/wk  Plan of Care Review  Plan of Care Reviewed With: patient  Plan of Care Reviewed With: patient  Outcome Summary: OT eval completed. Pt s/p CVA with residual L sided hemiparesis. Pt reports he has been standing with PT at [facility] but unable to take steps yet. Reports receiving modA for most UB ADL, maxA LB ADL. At this time pt completes grooming with intentional tremor noted in R hand, S and set up required. Rolls to L with vc and modA with use of bed rail. Skilled OT required to address ADL retraining, positioning/PROM LUE, and increase mobility. OT recommends return to skilled rehab upon d/c.          Outcome Measures     Row Name 11/02/18 1500             How much help from another is currently needed...    Putting on and taking off regular lower body clothing? 2  -MW      Bathing (including washing, rinsing, and drying) 2  -MW      Toileting (which includes using toilet bed pan or urinal) 2  -MW      Putting on and taking off regular upper body clothing 2  -MW      Taking care of  personal grooming (such as brushing teeth) 2  -MW      Eating meals 2  -MW      Score 12  -MW         Functional Assessment    Outcome Measure Options AM-PAC 6 Clicks Daily Activity (OT)  -MW        User Key  (r) = Recorded By, (t) = Taken By, (c) = Cosigned By    Initials Name Provider Type    Rhina Chin OTR/L Occupational Therapist          Time Calculation:         Time Calculation- OT     Row Name 11/02/18 1507             Time Calculation- OT    OT Start Time 1310  -MW      OT Stop Time 1354  -MW      OT Time Calculation (min) 44 min  -MW      OT Received On 11/02/18  -MW      OT Goal Re-Cert Due Date 11/12/18  -MW        User Key  (r) = Recorded By, (t) = Taken By, (c) = Cosigned By    Initials Name Provider Type    Rhina Chin OTR/L Occupational Therapist        Therapy Suggested Charges     Code   Minutes Charges    None           Therapy Charges for Today     Code Description Service Date Service Provider Modifiers Qty    32242235742 HC OT SELFCARE CURRENT 11/2/2018 Rhina Mckeon OTR/L GO, CL 1    94336176720 HC OT SELFCARE PROJECTED 11/2/2018 Rhina Mckeon OTR/L ESTUARDO, CK 1    06181004407 HC OT EVAL MOD COMPLEXITY 3 11/2/2018 Rhina Mckeon OTR/L GO, KX 1          OT G-codes  OT Professional Judgement Used?: Yes  OT Functional Scales Options: AM-PAC 6 Clicks Daily Activity (OT)  Score: 12  Functional Limitation: Self care  Self Care Current Status (): At least 60 percent but less than 80 percent impaired, limited or restricted  Self Care Goal Status (): At least 40 percent but less than 60 percent impaired, limited or restricted    Rhina Mckeon OTR/L  11/2/2018

## 2018-11-02 NOTE — PROGRESS NOTES
Discharge Planning Assessment  [facility]     Patient Name: Angelika Sapp  MRN: [id number]  Today's Date: 11/2/2018    Admit Date: 11/1/2018          Discharge Needs Assessment     Row Name 11/02/18 1105       Living Environment    Lives With facility resident    Name(s) of Who Lives With Patient Resides at [facility]    Current Living Arrangements extended care facility    Able to Return to Prior Arrangements yes       Resource/Environmental Concerns    Resource/Environmental Concerns none       Transition Planning    Patient/Family Anticipates Transition to long term care facility    Patient/Family Anticipated Services at Transition     Transportation Anticipated health plan transportation       Discharge Needs Assessment    Readmission Within the Last 30 Days no previous admission in last 30 days    Concerns to be Addressed care coordination/care conferences;discharge planning    Outpatient/Agency/Support Group Needs skilled nursing facility    Discharge Facility/Level of Care Needs nursing facility, skilled    Current Discharge Risk chronically ill    Discharge Coordination/Progress Patient is currently residing at [facility] [phone].  Spoke with Rosetta in admissions at [facility] who advises she has not contacted family regarding bed hold.  [facility] will accept patient back upon discharge as long as they have bed availability.  Plan at this time is to return to [facility] upon discharge.            Discharge Plan    No documentation.       Destination     No service coordination in this encounter.      Durable Medical Equipment     No service coordination in this encounter.      Dialysis/Infusion     No service coordination in this encounter.      Home Medical Care     No service coordination in this encounter.      Social Care     No service coordination in this encounter.                Demographic Summary    No documentation.           Functional Status    No documentation.            Psychosocial    No documentation.           Abuse/Neglect    No documentation.           Legal    No documentation.           Substance Abuse    No documentation.           Patient Forms    No documentation.         SWATI LópezW

## 2018-11-02 NOTE — PAYOR COMM NOTE
"11/2/18 [facility] [phone]  [phone]      AUTH [id number]        Angelika Sapp (78 y.o. Male)     Date of Birth Social Security Number Address Home Phone MRN    1940  [address] [facility] [address] [phone] [id number]    Sabianism Marital Status          Other        Admission Date Admission Type Admitting Provider Attending Provider Department, Room/Bed    11/1/18 Urgent Lito Odom MD Truong, Khai C, MD [facility] INTENSIVE CARE, I005/1    Discharge Date Discharge Disposition Discharge Destination                       Attending Provider:  Lito Odom MD    Allergies:  No Known Allergies    Isolation:  None   Infection:  None   Code Status:  CPR    Ht:  175.3 cm (69\")   Wt:  97.8 kg (215 lb 11.2 oz)    Admission Cmt:  None   Principal Problem:  None                Active Insurance as of 11/1/2018     Primary Coverage     Payor Plan Insurance Group Employer/Plan Group    MEDICARE MEDICARE A & B      Payor Plan Address Payor Plan Phone Number Effective From Effective To    [address] [phone] 2/1/2003     [facility] [address]       Subscriber Name Subscriber Birth Date Member ANGELIKA VARGAS 1940 [id number]           Secondary Coverage     Payor Plan Insurance Group Employer/Plan Group    AETNA COMMERCIAL AETNA [facility] [id number]     Payor Plan Address Payor Plan Phone Number Effective From Effective To    [address] [phone] 1/1/2013     [facility] [id number]       Subscriber Name Subscriber Birth Date Member ANGELIKA VARGAS 1940 [id number]                 Emergency Contacts      (Rel.) Home Phone Work Phone Mobile Phone    Patricia Prieto (Spouse) [phone] -- --    Freddy Aarnda (Son) -- [phone] --        H&P  Cosign Needed   Date of Service: 11/1/2018 6:00 PM  Angi Chin, APRN   Medicine   Expand All Collapse All    []Manual[]Template  []Copied       [facility] " "[facility] Medicine Services  HISTORY AND PHYSICAL     Date of Admission: 11/1/2018  Primary Care Physician: Sergio Ma MD     Subjective      Chief Complaint: Transfer for new diagnosis bilateral pulmonary emboli     History of Present Illness  Angelika Sapp is a 78-year-old  male with a past medical history of right lacunar infarction with extension into the right paraventricular hemisphere 8/11/2018-with hemiplegia now wheelchair bound, insulin-dependent diabetes, chronic kidney disease stage III, hyperlipidemia, hypertension, Parkinson's disease.  Patient was discharged from this facility on 8/14/2018-[address] [place] [address] nursing home subsequently transferred to [facility] in [facility].  Patient was working with physical therapy today when he had a syncopal episode complaining of dizziness and \"almost passed out\".  Information obtained from transfer documentation.  Currently patient states he has no memory of incident.  Initial room air saturation was 87%.  This increased to 95% on 2 L.  Patient was transferred to [facility] where workup revealed bilateral pulmonary emboli.  Patient has subsequently been transferred to [facility] for higher level of care.  Currently he is using oxygen and has no complaints of shortness of breathing.  He is alert and oriented.  He denies chest pain or abdominal pain.  He states he has chronic tremors of right upper extremity from his Parkinson's disease.  Patient does have multiple areas of skin changes secondary to pressure.  He is without distress.  He is admitted for further evaluation and treatment.     Review of Systems   A 10 point review of systems was completed, all negative except for those discussed in HPI     Past Medical History:   Medical History        Past Medical History:   Diagnosis Date   • Arthritis     • Chronic kidney disease (CKD), stage III (moderate) (CMS/HCC)     • CVA (cerebral " vascular accident) (CMS/HCC)       Right lacunar infarction with extension into right paraventricular hemisphere   • Depressive disorder     • GERD (gastroesophageal reflux disease)     • Hemiplegia (CMS/HCC)     • Hyperlipidemia     • Hypertension     • Insulin dependent diabetes mellitus (CMS/HCC)     • Parkinson's disease (CMS/[facility])              Past Surgical History:   Surgical History   No past surgical history on file.        Family History: family history includes Alcohol abuse in his father; Alzheimer's disease in his mother.     Social History:  reports that he has never smoked. He has quit using smokeless tobacco. His smokeless tobacco use included Chew. He reports that he does not drink alcohol or use drugs.     Code Status: Full, if unable speak for himself his family will speak for him        Allergies:  No Known Allergies     Medications:          Prior to Admission medications    Medication Sig Start Date End Date Taking? Authorizing Provider   aspirin 81 MG chewable tablet Chew 81 mg Daily.       Denise Smith MD   atorvastatin (LIPITOR) 80 MG tablet Take 1 tablet by mouth Every Night. 8/14/18     Mick Francisco APRN   carbidopa-levodopa (SINEMET)  MG per tablet Take 2 tablets by mouth 3 (Three) Times a Day.       Denise Smith MD   clopidogrel (PLAVIX) 75 MG tablet Take 75 mg by mouth Daily.       Denise Smith MD   escitalopram (LEXAPRO) 20 MG tablet Take 20 mg by mouth Daily.       Denise Smith MD   fenofibrate (TRIGLIDE) 160 MG tablet Take 160 mg by mouth Every Night.       Denise Smith MD   hydrALAZINE (APRESOLINE) 25 MG tablet Take 25 mg by mouth 2 (Two) Times a Day.       Denise Smith MD   insulin detemir (LEVEMIR) 100 UNIT/ML injection Inject 30 Units under the skin into the appropriate area as directed Every Night.       Denise Smith MD   insulin glulisine (APIDRA) 100 UNIT/ML injection Inject 35 Units under the skin into  "the appropriate area as directed Daily With Breakfast.       Provider, MD Denise   olmesartan (BENICAR) 20 MG tablet Take 40 mg by mouth Daily.       Provider, MD Denise   pantoprazole (PROTONIX) 40 MG EC tablet Take 40 mg by mouth Daily.       Provider, MD Denise   traZODone (DESYREL) 150 MG tablet Take 150 mg by mouth Every Night.       Provider, MD Denise   vitamin B-12 (CYANOCOBALAMIN) 250 MCG tablet Take 1 tablet by mouth Daily. 8/19/18     Mick Francisco, BRENNA         Objective      /94 (BP Location: Left arm, Patient Position: Lying)   Pulse 79   Temp 97.9 °F (36.6 °C) (Oral)   Resp 17   Ht 175.3 cm (69\")   Wt 97 kg (213 lb 14.4 oz)   SpO2 94%   BMI 31.59 kg/m²   Physical Exam   Constitutional: He is oriented to person, place, and time. He appears well-developed and well-nourished. No distress.   HENT:   Head: Normocephalic and atraumatic.   Eyes: Pupils are equal, round, and reactive to light. Conjunctivae and EOM are normal. No scleral icterus.   Neck: Normal range of motion. Neck supple. No JVD present. No tracheal deviation present.   Cardiovascular: Normal rate, regular rhythm, normal heart sounds and intact distal pulses.  Exam reveals no gallop.    No murmur heard.  Pulmonary/Chest: Effort normal and breath sounds normal. No respiratory distress. He has no wheezes. He has no rales.   Abdominal: Soft. Bowel sounds are normal. He exhibits no distension. There is no tenderness. There is no guarding.   Protuberant   Musculoskeletal: He exhibits no edema.   Lower extremity weakness, w/c bound secondary to Parkinson's and stroke   Neurological: He is alert and oriented to person, place, and time.   Tremor left upper extremity   Skin: Skin is warm and dry. No rash noted. He is not diaphoretic. No erythema. No pallor.   Bilateral heel discoloration - purplish; right blister open, left blister intact; coccyx pressure skin changes - purplish in color, minimally blanchable  "   Psychiatric: He has a normal mood and affect. His behavior is normal.   Vitals reviewed.        Pertinent Data: Studies obtained at [facility]   EKG revealed normal sinus rhythm at 87 with a first-degree heart block     Chest x-ray and negative for acute real pulmonary findings  CT of the head revealed old stroke no acute findings  CT of the chest report not available, will report bilateral pulmonary emboli     WBC 11.1, hemoglobin 12.6, hematocrit 34.8, platelet count 2 91,000, MCV 98.7     Sodium 136, potassium 5.5, chloride 107, CO2 23, BUN 28, creatinine 1.64, glucose 243, calcium 8.1, magnesium 1.9     Troponin 0.23, , d-dimer 3644 (499)        Assessment / Plan      Assessment:   Bilateral pulmonary emboli  CVA right lacunar with left hemiparesis and wheelchair dependence  Insulin dependent diabetes  Parkinson's disease  Chronic kidney disease stage III with worsening creatinine secondary to dehydration  Hypertension  Near syncope with exertion-room air oxygen saturation 87%     Plan:   1.  Admit as inpatient  2.  Change heparin drip to therapeutic Lovenox  3.  Stat echocardiogram  4.  Bilateral lower extremity Doppler studies in a.m.  5.  Home medications reviewed and restarted as appropriate  6.  Consult wound nurse  7.  Monitor glucose before meals and at bedtime, continue home Levemir dose, regular insulin sliding scale as needed  8.  Hydrate with normal saline 100 ML/hour  9.  Consult PT and OT for ongoing physical therapy secondary to stroke     I discussed the patient's findings and my recommendations with: Lito Odom MD  Time spent: 45 minutes        Angi Chin, BRENNA  11/01/18   6:47 PM          Progress Notes  Date of Service: 11/2/2018 7:53 AM  Lito Odom MD   Medicine      []Manual[]Template  []Copied       [facility] Medicine Services  INPATIENT PROGRESS NOTE     Length of Stay: 1  Date of Admission: 11/1/2018  Primary  Care Physician: Sergio Ma MD     Subjective   Chief Complaint: Bilateral pulmonary embolus/possible right heart strain/chronic renal failure/diabetes/recent stroke.     HPI   Patient denies any chest pain.  Patient denies any shortness of breath.  Patient tolerated diet.  Echocardiogram result discussed with family.  Plan for cardiology consult.  Discussed patient bleeding risk being on 3 blood thinner.  Patient had a CVA in August with little improvement on the left side.     Review of Systems   Constitutional: Positive for activity change, appetite change and fatigue. Negative for chills and fever.   HENT: Negative for hearing loss, nosebleeds, tinnitus and trouble swallowing.    Eyes: Negative for visual disturbance.   Respiratory: Negative for cough, chest tightness, shortness of breath and wheezing.    Cardiovascular: Negative for chest pain, palpitations and leg swelling.   Gastrointestinal: Negative for abdominal distention, abdominal pain, blood in stool, constipation, diarrhea, nausea and vomiting.   Endocrine: Negative for cold intolerance, heat intolerance, polydipsia, polyphagia and polyuria.   Genitourinary: Negative for decreased urine volume, difficulty urinating, dysuria, flank pain, frequency and hematuria.   Musculoskeletal: Positive for arthralgias, gait problem and myalgias. Negative for joint swelling.   Skin: Negative for rash.   Allergic/Immunologic: Negative for immunocompromised state.   Neurological: Positive for weakness. Negative for dizziness, syncope, light-headedness and headaches.   Hematological: Negative for adenopathy. Does not bruise/bleed easily.   Psychiatric/Behavioral: Negative for confusion and sleep disturbance. The patient is not nervous/anxious.             All pertinent negatives and positives are as above. All other systems have been reviewed and are negative unless otherwise stated.      Objective    Temp:  [97.9 °F (36.6 °C)-98.2 °F (36.8 °C)] 98.2 °F (36.8  °C)  Heart Rate:  [55-84] 55  Resp:  [12-21] 12  BP: ()/(50-97) 88/59     Intake/Output Summary (Last 24 hours) at 11/02/18 0753  Last data filed at 11/02/18 0506    Gross per 24 hour   Intake           1043.1 ml   Output              675 ml   Net            368.1 ml      Physical Exam   Constitutional: He is oriented to person, place, and time. He appears well-developed and well-nourished.   HENT:   Head: Normocephalic and atraumatic.   Eyes: Pupils are equal, round, and reactive to light. Conjunctivae and EOM are normal.   Neck: Neck supple. No JVD present. No thyromegaly present.   Cardiovascular: Normal rate, regular rhythm, normal heart sounds and intact distal pulses.  Exam reveals no gallop and no friction rub.    No murmur heard.  Pulmonary/Chest: Effort normal and breath sounds normal. No respiratory distress. He has no wheezes. He has no rales. He exhibits no tenderness.   Decrease in breath sound bilateral.   Abdominal: Soft. Bowel sounds are normal. He exhibits no distension. There is no tenderness. There is no rebound and no guarding.   Musculoskeletal: He exhibits edema (trace to +1 edema, left lower extremities). He exhibits no tenderness or deformity.   Left-sided paralysis.   Lymphadenopathy:     He has no cervical adenopathy.   Neurological: He is alert and oriented to person, place, and time. He displays normal reflexes. No cranial nerve deficit. He exhibits abnormal muscle tone. Coordination abnormal.   Left-sided paralysis   Skin: Skin is warm and dry. Capillary refill takes 2 to 3 seconds. No rash noted. No erythema.   Psychiatric: He has a normal mood and affect. His behavior is normal. Judgment and thought content normal.   Nursing note and vitals reviewed.        Results Review:          Lab Results (last 24 hours)      Procedure Component Value Units Date/Time     Hemoglobin A1c [[id number]] Collected:  11/02/18 0134     Specimen:  Blood Updated:  11/02/18 0447       Hemoglobin A1C 8.1  %       Narrative:        Less than 6.0           Non-Diabetic Range  6.0-7.0                 ADA Therapeutic Target  Greater than 7.0        Action Suggested     TSH [[id number]]  (Normal) Collected:  11/02/18 0134     Specimen:  Blood Updated:  11/02/18 0227       TSH 3.320 mIU/mL       Troponin [[id number]]  (Abnormal) Collected:  11/02/18 0134     Specimen:  Blood Updated:  11/02/18 0210       Troponin I 0.653 (C) ng/mL       Lipid Panel [[id number]]  (Abnormal) Collected:  11/02/18 0134     Specimen:  Blood Updated:  11/02/18 0207       Total Cholesterol 112 (L) mg/dL         Triglycerides 218 (H) mg/dL         HDL Cholesterol 22 (L) mg/dL         LDL Cholesterol  81 mg/dL         LDL/HDL Ratio 2.11     Comprehensive Metabolic Panel [[id number]]  (Abnormal) Collected:  11/02/18 0134     Specimen:  Blood Updated:  11/02/18 0156       Glucose 218 (H) mg/dL         BUN 27 (H) mg/dL         Creatinine 1.35 mg/dL         Sodium 137 mmol/L         Potassium 4.3 mmol/L         Chloride 107 mmol/L         CO2 22.0 (L) mmol/L         Calcium 8.2 (L) mg/dL         Total Protein 5.4 (L) g/dL         Albumin 2.90 (L) g/dL         ALT (SGPT) <15 U/L         AST (SGOT) 24 U/L         Alkaline Phosphatase 47 U/L         Total Bilirubin 0.3 mg/dL         eGFR Non African Amer 51 (L) mL/min/1.73         Globulin 2.5 gm/dL         A/G Ratio 1.2 g/dL         BUN/Creatinine Ratio 20.0       Anion Gap 8.0 mmol/L       Narrative:        The MDRD GFR formula is only valid for adults with stable renal function between ages 18 and 70.     CBC Auto Differential [[id number]]  (Abnormal) Collected:  11/02/18 0134     Specimen:  Blood Updated:  11/02/18 0145       WBC 6.32 10*3/mm3         RBC 3.37 (L) 10*6/mm3         Hemoglobin 10.6 (L) g/dL         Hematocrit 32.4 (L) %         MCV 96.1 (H) fL         MCH 31.5 pg         MCHC 32.7 (L) g/dL         RDW 14.0 %         RDW-SD 49.1 fl         MPV 9.5 fL         Platelets 255 10*3/mm3          Neutrophil % 55.0 %         Lymphocyte % 29.6 %         Monocyte % 10.1 %         Eosinophil % 4.3 (H) %         Basophil % 0.5 %         Immature Grans % 0.5 %         Neutrophils, Absolute 3.48 10*3/mm3         Lymphocytes, Absolute 1.87 10*3/mm3         Monocytes, Absolute 0.64 10*3/mm3         Eosinophils, Absolute 0.27 10*3/mm3         Basophils, Absolute 0.03 10*3/mm3         Immature Grans, Absolute 0.03 10*3/mm3         nRBC 0.0 /100 WBC       Troponin [[id number]]  (Abnormal) Collected:  11/01/18 2015     Specimen:  Blood Updated:  11/01/18 2049       Troponin I 0.608 (C) ng/mL       Protime-INR [[id number]]  (Normal) Collected:  11/01/18 2015     Specimen:  Blood Updated:  11/01/18 2031       Protime 13.5 Seconds         INR 1.00     POC Glucose Once [[id number]]  (Abnormal) Collected:  11/01/18 2009     Specimen:  Blood Updated:  11/01/18 2020       Glucose 193 (H) mg/dL         Comment: : [id number] [id number] ID: [id number]                Cultures:        Radiology Data:        Imaging Results (last 24 hours)      ** No results found for the last 24 hours. **             No Known Allergies     Scheduled meds:      aspirin 81 mg Oral Daily   atorvastatin 80 mg Oral Nightly   carbidopa-levodopa 2 tablet Oral TID   clopidogrel 75 mg Oral Daily   enoxaparin 1 mg/kg Subcutaneous Q12H   escitalopram 20 mg Oral Daily   gabapentin 300 mg Oral Nightly   hydrALAZINE 25 mg Oral BID   insulin detemir 40 Units Subcutaneous Nightly   insulin lispro 2-9 Units Subcutaneous 4x Daily With Meals & Nightly   losartan 100 mg Oral Q24H   pantoprazole 40 mg Oral Q AM   sodium chloride 3 mL Intravenous Q12H   traZODone 150 mg Oral Nightly         PRN meds:  •  acetaminophen  •  dextrose  •  dextrose  •  glucagon (human recombinant)  •  ipratropium-albuterol  •  ondansetron **OR** ondansetron ODT **OR** ondansetron  •  sodium chloride     Assessment/Plan        Bilateral pulmonary embolism  (CMS/[facility])        Plan:  Bilateral pulmonary embolus.  Continue Lovenox.   Bilateral lower extremity Doppler pending.  Therapeutic Lovenox.  Consult cardiology possible right heart strain.     Near-syncope with exertion/room air O2 sats 87%.  Troponin stable.  Echocardiogram shows ejection fraction 65%, right ventricle cavity is moderately to severely dilated/severe reduction of right ventricular systolic function/concentric hypertrophy.     Diabetes insulin-dependent.  Continue Levemir nightly and sliding scale.  Hemoglobin A1c 8.1.     Parkinsonism.  Continue Sinemet.     Chronic renal failure stage III.       Depression.  Continue Lexapro.  Continue trazodone at night.     Hypertension.  Continue Cozaar.     Neuropathy.  Continue Neurontin.     Anemia.  Anemia panel.  No sign of acute bleed.     History of CVA in august.  Left-sided paralysis.  Continue aspirin, Plavix, and Lipitor.     Deconditioning.  PT and OT consult.     Discharge Planning:  Unknown     Lito Odom MD   18   7:53 AM                                  [facility] CARDIOLOGY  [address] [place] [id number] [phone]             Angelika Sapp   Echocardiogram   Order# [id number]   Reading physician: Faraz Toney MD Ordering physician: Angi Chin APRN Study date: 18   Patient Information     Patient Name  Angelika Sapp MRN  [id number] Sex  Male  (Age)  1940 (78 y.o.)   Admission Information     Admission Date/Time Discharge Date/Time Room/Bed   18  1756  I005/1   Sedation Narrator Report     Sedation Narrator Report   Interpretation Summary     · Left ventricular wall thickness is consistent with mild-to-moderate concentric hypertrophy.  · Left ventricular systolic function is normal. Estimated EF = 65%.  · Right ventricular cavity is moderate-to-severely dilated.  · Severely reduced right ventricular systolic function noted.      Patient Hx Of Height, Weight, and Vitals     Height  "Weight BSA (Calculated - sq m) BMI (kg/m2) Pulse BP   175.3 cm (69\") 97 kg (213 lb 14.4 oz) 2.13 sq meters 31.65 71 141/81   Reason For Exam     Cardiac source of emboli   Cardiac History     Diagnosis Date Comment Source   Hyperlipidemia   Provider   Hypertension   Provider   Insulin dependent diabetes mellitus (CMS/[facility])   Provider   Study Description     Limited 2D echo with color flow and Doppler was performed The study is technically difficult for diagnosis.Verbal consent was obtained from the patient to use Definity contrast in order to optimize the study. The use of Definity was indicated as two or more contiguous segments of the left ventricular endocardial border were unable to be adequately visualized by standard imaging methods. 1.3 mL of mechanically activated Definity was mixed with 8.7 mL of normal saline. A total of 3 mL of the resulting Definity solution was administered, and the remaining contrast was wasted and discarded.   Echocardiogram Findings     Left Ventricle Left ventricular systolic function is normal. Estimated EF = 65%. Normal left ventricular cavity size noted. Left ventricular wall thickness is consistent with mild-to-moderate concentric hypertrophy.      Right Ventricle Right ventricular cavity is moderate-to-severely dilated. Severely reduced right ventricular systolic function noted.      Left Atrium Normal left atrial size noted.      Right Atrium Right atrial cavity size is mildly dilated.      Aortic Valve The aortic valve is abnormal in structure. The valve exhibits sclerosis. No aortic valve regurgitation is present. No aortic valve stenosis is present.      Mitral Valve The mitral valve is grossly normal in structure. No mitral valve regurgitation is present. No significant mitral valve stenosis is present.      Tricuspid Valve The tricuspid valve is grossly normal. Trace tricuspid valve regurgitation is present. Mild pulmonary hypertension is present.      Pulmonic Valve The " pulmonic valve was not well visualized. The pulmonic valve is grossly normal in structure.      Greater Vessels The aortic root was not well visualized. No dilation of the aortic root is present.      Pericardium There is no evidence of pericardial effusion.         LV Measurements     Dimensions   LVIDd 3.9 cm      IVSd 1.4 cm      FS 44.4 %      ESV(cubed) 9.9 ml      EDV(cubed) 58 ml       Dimensions   LVIDs 2.2 cm      LVPWd 1.2 cm      IVS/LVPW 1.1       LV Sys Vol (BSA corrected) 18 ml/m^2      LV Stone Vol (BSA corrected) 49 ml/m^2      LV mass(C)d 170.5 grams      EDV(MOD-sp4) 104 ml      ESV(MOD-sp4) 38.2 ml      Systolic Function   SV(MOD-sp4) 65.8 ml      SI(MOD-sp4) 31 ml/m^2      EF(MOD-sp4) 63.3 %      HCM   LV V1 max PG 1.6 mmHg         Right Ventricle Measurements        LA Measurements     LA Dimensions   LA dimension 3 cm         Aortic Valve Measurements     Stenosis   LV V1 max 62.8 cm/sec      LV V1 max PG 1.6 mmHg      LV V1 mean PG 1 mmHg      LV V1 VTI 15.3 cm      Ao pk brendan 100 cm/sec       Stenosis   Ao max PG 4 mmHg      Ao mean PG 2 mmHg      Ao V2 VTI 21.7 cm         Tricuspid Valve Measurements     Regurgitation   TR max brendan 290 cm/sec      RVSP(TR) 38.6 mmHg      RAP systole 5 mmHg      PISA   TR max brendan 290 cm/sec          Study Information     Physician Technologist Supporting Staff    Nehal Houser    PA   4.80 - 10.80 10*3/mm3 6.32  6.27  6.16   6.11      RBC 4.80 - 5.90 10*6/mm3 3.37   3.98   3.74    4.22      Hemoglobin 14.0 - 18.0 g/dL 10.6   12.9   12.3    13.9      Hematocrit 40.0 - 52.0 % 32.4   37.6   35.7   40.7  40.4     MCV 82.0 - 95.0 fL 96.1   94.5  95.5    95.7      MCH 28.0 - 32.0 pg 31.5  32.4   32.9    32.9      MCHC 33.0 - 36.0 g/dL 32.7   34.3  34.5   34.4     RDW 12.0 - 15.0 % 14.0  12.6  12.8   12.9     RDW-SD 40.0 - 54.0 fl 49.1  43.6  45.1   45.9     MPV 6.0 - 12.0 fL 9.5  10.3  9.9   10.3     Platelets 130 - 400 10*3/mm3 255  217  199  238  255      Neutrophil % 39.0 - 78.0 % 55.0   56.2       Lymphocyte % 15.0 - 45.0 % 29.6   25.8       Monocyte % 4.0 - 12.0 % 10.1   12.3        Eosinophil % 0.0 - 4.0 % 4.3    4.4        Basophil % 0.0 - 2.0 % 0.5   0.5       Immature Grans % 0.0 - 5.0 % 0.5   0.8       Neutrophils, Absolute 1.87 - 8.40 10*3/mm3 3.48   3.46       Lymphocytes, Absolute 0.72 - 4.86 10*3/mm3 1.87   1.59       Monocytes, Absolute 0.19 - 1.30 10*3/mm3 0.64   0.76       Eosinophils, Absolute 0.00 - 0.70 10*3/mm3 0.27   0.27       Basophils, Absolute 0.00 - 0.20 10*3/mm3 0.03   0.03       Immature Grans, Absolute 0.00 - 0.03 10*3/mm3 0.03   0.05        nRBC 0.0 - 0.0 /100 WBC 0.0   0.0      Resulting Agency             Hospital Medications (active)       Dose Frequency Start End    acetaminophen (TYLENOL) tablet 650 mg 650 mg Every 4 Hours PRN 11/1/2018     Sig - Route: Take 2 tablets by mouth Every 4 (Four) Hours As Needed for Headache or Fever (temperature greater than 101.5 F). - Oral    aspirin chewable tablet 81 mg 81 mg Daily 11/1/2018     Sig - Route: Chew 1 tablet Daily. - Oral    atorvastatin (LIPITOR) tablet 80 mg 80 mg Nightly 11/1/2018     Sig - Route: Take 2 tablets by mouth Every Night. - Oral    carbidopa-levodopa (SINEMET)  MG per tablet 2 tablet 2 tablet 3 Times Daily 11/1/2018     Sig - Route: Take 2 tablets by mouth 3 (Three) Times a Day. - Oral    clopidogrel (PLAVIX) tablet 75 mg 75 mg Daily 11/1/2018     Sig - Route: Take 1 tablet by mouth Daily. - Oral    dextrose (D50W) 25 g/ 50mL Intravenous Solution 25 g 25 g Every 15 Minutes PRN 11/1/2018     Sig - Route: Infuse 50 mL into a venous catheter Every 15 (Fifteen) Minutes As Needed for Low Blood Sugar (Blood Sugar Less Than 70). - Intravenous    dextrose (GLUTOSE) oral gel 15 g 15 g Every 15 Minutes PRN 11/1/2018     Sig - Route: Take 15 g by mouth Every 15 (Fifteen) Minutes As Needed for Low Blood Sugar (Blood sugar less than 70). - Oral    enoxaparin (LOVENOX) syringe  100 mg 1 mg/kg × 97 kg Every 12 Hours Scheduled 11/2/2018     Sig - Route: Inject 1 mL under the skin into the appropriate area as directed Every 12 (Twelve) Hours. - Subcutaneous    enoxaparin (LOVENOX) syringe 100 mg 1 mg/kg × 97 kg Once 11/1/2018 11/1/2018    Sig - Route: Inject 1 mL under the skin into the appropriate area as directed 1 (One) Time. - Subcutaneous    escitalopram (LEXAPRO) tablet 20 mg 20 mg Daily 11/1/2018     Sig - Route: Take 2 tablets by mouth Daily. - Oral    gabapentin (NEURONTIN) capsule 300 mg 300 mg Nightly 11/1/2018     Sig - Route: Take 1 capsule by mouth Every Night. - Oral    glucagon (human recombinant) (GLUCAGEN DIAGNOSTIC) injection 1 mg 1 mg As Needed 11/1/2018     Sig - Route: Inject 1 mg under the skin into the appropriate area as directed As Needed (Blood Glucose Less Than 70). - Subcutaneous    hydrALAZINE (APRESOLINE) tablet 25 mg 25 mg 2 Times Daily 11/1/2018     Sig - Route: Take 1 tablet by mouth 2 (Two) Times a Day. - Oral    insulin detemir (LEVEMIR) injection 40 Units 40 Units Nightly 11/1/2018     Sig - Route: Inject 40 Units under the skin into the appropriate area as directed Every Night. - Subcutaneous    insulin lispro (humaLOG) injection 2-9 Units 2-9 Units 4 Times Daily With Meals & Nightly 11/1/2018     Sig - Route: Inject 2-9 Units under the skin into the appropriate area as directed 4 (Four) Times a Day With Meals & at Bedtime. - Subcutaneous    ipratropium-albuterol (DUO-NEB) nebulizer solution 3 mL 3 mL Every 6 Hours PRN 11/1/2018     Sig - Route: Take 3 mL by nebulization Every 6 (Six) Hours As Needed for Wheezing or Shortness of Air. - Nebulization    losartan (COZAAR) tablet 100 mg 100 mg Every 24 Hours Scheduled 11/1/2018     Sig - Route: Take 2 tablets by mouth Daily. - Oral    ondansetron (ZOFRAN) injection 4 mg 4 mg Every 6 Hours PRN 11/1/2018     Sig - Route: Infuse 2 mL into a venous catheter Every 6 (Six) Hours As Needed for Nausea or Vomiting. -  "Intravenous    Linked Group 1:  \"Or\" Linked Group Details        ondansetron (ZOFRAN) tablet 4 mg 4 mg Every 6 Hours PRN 11/1/2018     Sig - Route: Take 1 tablet by mouth Every 6 (Six) Hours As Needed for Nausea or Vomiting. - Oral    Linked Group 1:  \"Or\" Linked Group Details        ondansetron ODT (ZOFRAN-ODT) disintegrating tablet 4 mg 4 mg Every 6 Hours PRN 11/1/2018     Sig - Route: Take 1 tablet by mouth Every 6 (Six) Hours As Needed for Nausea or Vomiting. - Oral    Linked Group 1:  \"Or\" Linked Group Details        pantoprazole (PROTONIX) EC tablet 40 mg 40 mg Every Early Morning 11/2/2018     Sig - Route: Take 1 tablet by mouth Every Morning. - Oral    perflutren (DEFINITY) lipid microspheres injection 8.476 mg 1.3 mL Once 11/1/2018 11/1/2018    Sig - Route: Infuse 1.3 mL into a venous catheter 1 (One) Time. - Intravenous    sodium chloride 0.9 % flush 3 mL 3 mL Every 12 Hours Scheduled 11/1/2018     Sig - Route: Infuse 3 mL into a venous catheter Every 12 (Twelve) Hours. - Intravenous    sodium chloride 0.9 % flush 3-10 mL 3-10 mL As Needed 11/1/2018     Sig - Route: Infuse 3-10 mL into a venous catheter As Needed for Line Care. - Intravenous    traZODone (DESYREL) tablet 150 mg 150 mg Nightly 11/1/2018     Sig - Route: Take 1 tablet by mouth Every Night. - Oral    sodium chloride 0.9 % infusion (Discontinued) 100 mL/hr Continuous 11/1/2018 11/2/2018    Sig - Route: Infuse 100 mL/hr into a venous catheter Continuous. - Intravenous          "

## 2018-11-03 PROBLEM — G20 PARKINSONISM (HCC): Status: ACTIVE | Noted: 2018-11-03

## 2018-11-03 PROBLEM — I82.409 DEEP VEIN THROMBOSIS (DVT) OF LOWER EXTREMITY (HCC): Status: ACTIVE | Noted: 2018-11-03

## 2018-11-03 PROBLEM — E11.9 DM (DIABETES MELLITUS) (HCC): Status: ACTIVE | Noted: 2018-11-03

## 2018-11-03 LAB
ALBUMIN SERPL-MCNC: 3 G/DL (ref 3.5–5)
ALBUMIN/GLOB SERPL: 1.1 G/DL (ref 1.1–2.5)
ALP SERPL-CCNC: 40 U/L (ref 24–120)
ALT SERPL W P-5'-P-CCNC: <15 U/L (ref 0–54)
ANION GAP SERPL CALCULATED.3IONS-SCNC: 7 MMOL/L (ref 4–13)
AST SERPL-CCNC: 20 U/L (ref 7–45)
BASOPHILS # BLD AUTO: 0.05 10*3/MM3 (ref 0–0.2)
BASOPHILS NFR BLD AUTO: 0.9 % (ref 0–2)
BILIRUB SERPL-MCNC: 0.4 MG/DL (ref 0.1–1)
BUN BLD-MCNC: 24 MG/DL (ref 5–21)
BUN/CREAT SERPL: 18.8 (ref 7–25)
CALCIUM SPEC-SCNC: 8.5 MG/DL (ref 8.4–10.4)
CHLORIDE SERPL-SCNC: 108 MMOL/L (ref 98–110)
CO2 SERPL-SCNC: 25 MMOL/L (ref 24–31)
CREAT BLD-MCNC: 1.28 MG/DL (ref 0.5–1.4)
DEPRECATED RDW RBC AUTO: 50.4 FL (ref 40–54)
EOSINOPHIL # BLD AUTO: 0.31 10*3/MM3 (ref 0–0.7)
EOSINOPHIL NFR BLD AUTO: 5.4 % (ref 0–4)
ERYTHROCYTE [DISTWIDTH] IN BLOOD BY AUTOMATED COUNT: 14 % (ref 12–15)
FERRITIN SERPL-MCNC: 153 NG/ML (ref 17.9–464)
FOLATE SERPL-MCNC: 5.33 NG/ML
GFR SERPL CREATININE-BSD FRML MDRD: 54 ML/MIN/1.73
GLOBULIN UR ELPH-MCNC: 2.7 GM/DL
GLUCOSE BLD-MCNC: 75 MG/DL (ref 70–100)
GLUCOSE BLDC GLUCOMTR-MCNC: 163 MG/DL (ref 70–130)
GLUCOSE BLDC GLUCOMTR-MCNC: 180 MG/DL (ref 70–130)
GLUCOSE BLDC GLUCOMTR-MCNC: 203 MG/DL (ref 70–130)
GLUCOSE BLDC GLUCOMTR-MCNC: 61 MG/DL (ref 70–130)
HCT VFR BLD AUTO: 33.2 % (ref 40–52)
HGB BLD-MCNC: 10.8 G/DL (ref 14–18)
IMM GRANULOCYTES # BLD: 0.04 10*3/MM3 (ref 0–0.03)
IMM GRANULOCYTES NFR BLD: 0.7 % (ref 0–5)
IRON 24H UR-MRATE: 58 MCG/DL (ref 42–180)
IRON SATN MFR SERPL: 18 % (ref 20–45)
LYMPHOCYTES # BLD AUTO: 1.59 10*3/MM3 (ref 0.72–4.86)
LYMPHOCYTES NFR BLD AUTO: 27.6 % (ref 15–45)
MCH RBC QN AUTO: 32 PG (ref 28–32)
MCHC RBC AUTO-ENTMCNC: 32.5 G/DL (ref 33–36)
MCV RBC AUTO: 98.2 FL (ref 82–95)
MONOCYTES # BLD AUTO: 0.61 10*3/MM3 (ref 0.19–1.3)
MONOCYTES NFR BLD AUTO: 10.6 % (ref 4–12)
NEUTROPHILS # BLD AUTO: 3.16 10*3/MM3 (ref 1.87–8.4)
NEUTROPHILS NFR BLD AUTO: 54.8 % (ref 39–78)
NRBC BLD MANUAL-RTO: 0 /100 WBC (ref 0–0)
PLATELET # BLD AUTO: 237 10*3/MM3 (ref 130–400)
PMV BLD AUTO: 10 FL (ref 6–12)
POTASSIUM BLD-SCNC: 4.2 MMOL/L (ref 3.5–5.3)
PROT SERPL-MCNC: 5.7 G/DL (ref 6.3–8.7)
RBC # BLD AUTO: 3.38 10*6/MM3 (ref 4.8–5.9)
RETICS #: 0.07 10*6/MM3 (ref 0.02–0.13)
RETICS/RBC NFR AUTO: 2.15 % (ref 0.6–1.8)
SODIUM BLD-SCNC: 140 MMOL/L (ref 135–145)
TIBC SERPL-MCNC: 316 MCG/DL (ref 225–420)
VIT B12 BLD-MCNC: 341 PG/ML (ref 239–931)
WBC NRBC COR # BLD: 5.76 10*3/MM3 (ref 4.8–10.8)

## 2018-11-03 PROCEDURE — 94799 UNLISTED PULMONARY SVC/PX: CPT

## 2018-11-03 PROCEDURE — 82607 VITAMIN B-12: CPT | Performed by: FAMILY MEDICINE

## 2018-11-03 PROCEDURE — 82746 ASSAY OF FOLIC ACID SERUM: CPT | Performed by: FAMILY MEDICINE

## 2018-11-03 PROCEDURE — 85025 COMPLETE CBC W/AUTO DIFF WBC: CPT | Performed by: FAMILY MEDICINE

## 2018-11-03 PROCEDURE — 82728 ASSAY OF FERRITIN: CPT | Performed by: FAMILY MEDICINE

## 2018-11-03 PROCEDURE — 63710000001 INSULIN LISPRO (HUMAN) PER 5 UNITS: Performed by: NURSE PRACTITIONER

## 2018-11-03 PROCEDURE — 83550 IRON BINDING TEST: CPT | Performed by: FAMILY MEDICINE

## 2018-11-03 PROCEDURE — 83540 ASSAY OF IRON: CPT | Performed by: FAMILY MEDICINE

## 2018-11-03 PROCEDURE — 99222 1ST HOSP IP/OBS MODERATE 55: CPT | Performed by: SURGERY

## 2018-11-03 PROCEDURE — 85045 AUTOMATED RETICULOCYTE COUNT: CPT | Performed by: FAMILY MEDICINE

## 2018-11-03 PROCEDURE — 63710000001 INSULIN DETEMIR PER 5 UNITS: Performed by: NURSE PRACTITIONER

## 2018-11-03 PROCEDURE — 94760 N-INVAS EAR/PLS OXIMETRY 1: CPT

## 2018-11-03 PROCEDURE — 82962 GLUCOSE BLOOD TEST: CPT

## 2018-11-03 PROCEDURE — 80053 COMPREHEN METABOLIC PANEL: CPT | Performed by: FAMILY MEDICINE

## 2018-11-03 PROCEDURE — 25010000002 ENOXAPARIN PER 10 MG: Performed by: NURSE PRACTITIONER

## 2018-11-03 RX ADMIN — INSULIN LISPRO 4 UNITS: 100 INJECTION, SOLUTION INTRAVENOUS; SUBCUTANEOUS at 20:41

## 2018-11-03 RX ADMIN — INSULIN LISPRO 2 UNITS: 100 INJECTION, SOLUTION INTRAVENOUS; SUBCUTANEOUS at 11:49

## 2018-11-03 RX ADMIN — ASPIRIN 81 MG CHEWABLE TABLET 81 MG: 81 TABLET CHEWABLE at 08:09

## 2018-11-03 RX ADMIN — Medication 3 ML: at 08:10

## 2018-11-03 RX ADMIN — TRAZODONE HYDROCHLORIDE 150 MG: 150 TABLET, FILM COATED ORAL at 20:40

## 2018-11-03 RX ADMIN — ESCITALOPRAM 20 MG: 10 TABLET, FILM COATED ORAL at 08:09

## 2018-11-03 RX ADMIN — CARBIDOPA AND LEVODOPA 2 TABLET: 25; 100 TABLET ORAL at 20:40

## 2018-11-03 RX ADMIN — ENOXAPARIN SODIUM 100 MG: 100 INJECTION SUBCUTANEOUS at 05:46

## 2018-11-03 RX ADMIN — DESMOPRESSIN ACETATE 80 MG: 0.2 TABLET ORAL at 20:40

## 2018-11-03 RX ADMIN — INSULIN LISPRO 2 UNITS: 100 INJECTION, SOLUTION INTRAVENOUS; SUBCUTANEOUS at 17:23

## 2018-11-03 RX ADMIN — PANTOPRAZOLE SODIUM 40 MG: 40 TABLET, DELAYED RELEASE ORAL at 05:46

## 2018-11-03 RX ADMIN — HYDRALAZINE HYDROCHLORIDE 25 MG: 25 TABLET, FILM COATED ORAL at 08:09

## 2018-11-03 RX ADMIN — CLOPIDOGREL 75 MG: 75 TABLET, FILM COATED ORAL at 08:09

## 2018-11-03 RX ADMIN — INSULIN DETEMIR 40 UNITS: 100 INJECTION, SOLUTION SUBCUTANEOUS at 20:40

## 2018-11-03 RX ADMIN — Medication 3 ML: at 20:43

## 2018-11-03 RX ADMIN — HYDRALAZINE HYDROCHLORIDE 25 MG: 25 TABLET, FILM COATED ORAL at 20:40

## 2018-11-03 RX ADMIN — LOSARTAN POTASSIUM 100 MG: 50 TABLET ORAL at 08:09

## 2018-11-03 RX ADMIN — CARBIDOPA AND LEVODOPA 2 TABLET: 25; 100 TABLET ORAL at 15:45

## 2018-11-03 RX ADMIN — ENOXAPARIN SODIUM 100 MG: 100 INJECTION SUBCUTANEOUS at 17:23

## 2018-11-03 RX ADMIN — GABAPENTIN 300 MG: 300 CAPSULE ORAL at 20:40

## 2018-11-03 RX ADMIN — CARBIDOPA AND LEVODOPA 2 TABLET: 25; 100 TABLET ORAL at 08:09

## 2018-11-03 NOTE — PLAN OF CARE
Problem: Patient Care Overview  Goal: Plan of Care Review  Outcome: Ongoing (interventions implemented as appropriate)   11/03/18 9328   Coping/Psychosocial   Plan of Care Reviewed With patient   Plan of Care Review   Progress improving   OTHER   Outcome Summary Patient did not c/o pain, VSS. Bedrest, continue lovenox. Turn every 2hrs and as needed. Skin barrier applied to coccyx. Cont to monitor patient .     Goal: Individualization and Mutuality  Outcome: Ongoing (interventions implemented as appropriate)      Problem: Syncope (Adult)  Goal: Identify Related Risk Factors and Signs and Symptoms  Outcome: Ongoing (interventions implemented as appropriate)    Goal: Physical Safety/Health Maintenance  Outcome: Ongoing (interventions implemented as appropriate)      Problem: Cardiac: ACS (Acute Coronary Syndrome) (Adult)  Goal: Signs and Symptoms of Listed Potential Problems Will be Absent, Minimized or Managed (Cardiac: ACS)  Outcome: Ongoing (interventions implemented as appropriate)      Problem: Skin Injury Risk (Adult)  Goal: Identify Related Risk Factors and Signs and Symptoms  Outcome: Outcome(s) achieved Date Met: 11/03/18    Goal: Skin Health and Integrity  Outcome: Ongoing (interventions implemented as appropriate)      Problem: Wound (Includes Pressure Injury) (Adult)  Goal: Signs and Symptoms of Listed Potential Problems Will be Absent, Minimized or Managed (Wound)  Outcome: Ongoing (interventions implemented as appropriate)      Problem: VTE, DVT and PE (Adult)  Goal: Signs and Symptoms of Listed Potential Problems Will be Absent, Minimized or Managed (VTE, DVT and PE)  Outcome: Ongoing (interventions implemented as appropriate)      Problem: Fall Risk (Adult)  Goal: Identify Related Risk Factors and Signs and Symptoms  Outcome: Outcome(s) achieved Date Met: 11/03/18    Goal: Absence of Fall  Outcome: Ongoing (interventions implemented as appropriate)

## 2018-11-03 NOTE — PROGRESS NOTES
[facility] Medicine Services  INPATIENT PROGRESS NOTE    Length of Stay: 2  Date of Admission: 11/1/2018  Primary Care Physician: Sergio Ma MD    Subjective   Chief Complaint: Bilateral pulmonary embolus/possible right heart strain/chronic renal failure/diabetes/recent stroke/DVT    HPI   Patient denies any chest pain or shortness of breath.  Patient not a candidate for EKOS due to recent stroke.  Bilateral DVTs.-Consult vascular for possible IVC filter placement.  Patient is mostly bedbound due to CVA and parkinsonism.    Review of Systems   Constitutional: Positive for activity change, appetite change and fatigue. Negative for chills and fever.   HENT: Negative for hearing loss, nosebleeds, tinnitus and trouble swallowing.    Eyes: Negative for visual disturbance.   Respiratory: Negative for cough, chest tightness, shortness of breath and wheezing.    Cardiovascular: Negative for chest pain, palpitations and leg swelling.   Gastrointestinal: Negative for abdominal distention, abdominal pain, blood in stool, constipation, diarrhea, nausea and vomiting.   Endocrine: Negative for cold intolerance, heat intolerance, polydipsia, polyphagia and polyuria.   Genitourinary: Negative for decreased urine volume, difficulty urinating, dysuria, flank pain, frequency and hematuria.   Musculoskeletal: Positive for arthralgias, gait problem and myalgias. Negative for joint swelling.   Skin: Negative for rash.   Allergic/Immunologic: Negative for immunocompromised state.   Neurological: Positive for weakness. Negative for dizziness, syncope, light-headedness and headaches.   Hematological: Negative for adenopathy. Does not bruise/bleed easily.   Psychiatric/Behavioral: Negative for confusion and sleep disturbance. The patient is not nervous/anxious.      All pertinent negatives and positives are as above. All other systems have been reviewed and are negative unless otherwise stated.      Objective    Temp:  [98 °F (36.7 °C)-98.9 °F (37.2 °C)] 98.5 °F (36.9 °C)  Heart Rate:  [51-65] 65  Resp:  [13-18] 13  BP: ()/(51-73) 128/63    Intake/Output Summary (Last 24 hours) at 11/03/18 0982  Last data filed at 11/03/18 0755   Gross per 24 hour   Intake              240 ml   Output              950 ml   Net             -710 ml     Physical Exam  Constitutional: He is oriented to person, place, and time. He appears well-developed and well-nourished.   HENT:   Head: Normocephalic and atraumatic.   Eyes: Pupils are equal, round, and reactive to light. Conjunctivae and EOM are normal.   Neck: Neck supple. No JVD present. No thyromegaly present.   Cardiovascular: Normal rate, regular rhythm, normal heart sounds and intact distal pulses.  Exam reveals no gallop and no friction rub.    No murmur heard.  Pulmonary/Chest: Effort normal and breath sounds normal. No respiratory distress. He has no wheezes. He has no rales. He exhibits no tenderness.   Decrease in breath sound bilateral.   Abdominal: Soft. Bowel sounds are normal. He exhibits no distension. There is no tenderness. There is no rebound and no guarding.   Musculoskeletal: He exhibits edema (trace to +1 edema, left lower extremities). He exhibits no tenderness or deformity.   Left-sided paralysis.   Lymphadenopathy:     He has no cervical adenopathy.   Neurological: He is alert and oriented to person, place, and time. He displays normal reflexes. No cranial nerve deficit. He exhibits abnormal muscle tone. Coordination abnormal.   Left-sided paralysis   Skin: Skin is warm and dry. Capillary refill takes 2 to 3 seconds. No rash noted. No erythema.   Psychiatric: He has a normal mood and affect. His behavior is normal. Judgment and thought content normal.   Nursing note and vitals reviewed.  Results Review:  Lab Results (last 24 hours)     Procedure Component Value Units Date/Time    POC Glucose Once [[id number]]  (Abnormal) Collected:  11/03/18 0751     Specimen:  Blood Updated:  11/03/18 0811     Glucose 61 (L) mg/dL      Comment: : [id number] Fadi Figueroa ID: [id number]       Vitamin B12 [[id number]]  (Normal) Collected:  11/03/18 0424    Specimen:  Blood Updated:  11/03/18 0609     Vitamin B-12 341 pg/mL     Folate [[id number]] Collected:  11/03/18 0424    Specimen:  Blood Updated:  11/03/18 0609     Folate 5.33 ng/mL     Ferritin [[id number]]  (Normal) Collected:  11/03/18 0424    Specimen:  Blood Updated:  11/03/18 0538     Ferritin 153.00 ng/mL     Iron Profile [[id number]]  (Abnormal) Collected:  11/03/18 0424    Specimen:  Blood Updated:  11/03/18 0512     Iron 58 mcg/dL      TIBC 316 mcg/dL      Iron Saturation 18 (L) %     Comprehensive Metabolic Panel [[id number]]  (Abnormal) Collected:  11/03/18 0424    Specimen:  Blood Updated:  11/03/18 0502     Glucose 75 mg/dL      BUN 24 (H) mg/dL      Creatinine 1.28 mg/dL      Sodium 140 mmol/L      Potassium 4.2 mmol/L      Chloride 108 mmol/L      CO2 25.0 mmol/L      Calcium 8.5 mg/dL      Total Protein 5.7 (L) g/dL      Albumin 3.00 (L) g/dL      ALT (SGPT) <15 U/L      AST (SGOT) 20 U/L      Alkaline Phosphatase 40 U/L      Total Bilirubin 0.4 mg/dL      eGFR Non African Amer 54 (L) mL/min/1.73      Globulin 2.7 gm/dL      A/G Ratio 1.1 g/dL      BUN/Creatinine Ratio 18.8     Anion Gap 7.0 mmol/L     Narrative:       The MDRD GFR formula is only valid for adults with stable renal function between ages 18 and 70.    CBC & Differential [[id number]] Collected:  11/03/18 0424    Specimen:  Blood Updated:  11/03/18 0453    Narrative:       The following orders were created for panel order CBC & Differential.  Procedure                               Abnormality         Status                     ---------                               -----------         ------                     CBC Auto Differential[[id number]]        Abnormal            Final result                 Please view results for these tests  on the individual orders.    Reticulocytes [[id number]]  (Abnormal) Collected:  11/03/18 0424    Specimen:  Blood Updated:  11/03/18 0453     Reticulocyte % 2.15 (H) %      Reticulocyte Absolute 0.0727 10*6/mm3     CBC Auto Differential [[id number]]  (Abnormal) Collected:  11/03/18 0424    Specimen:  Blood Updated:  11/03/18 0453     WBC 5.76 10*3/mm3      RBC 3.38 (L) 10*6/mm3      Hemoglobin 10.8 (L) g/dL      Hematocrit 33.2 (L) %      MCV 98.2 (H) fL      MCH 32.0 pg      MCHC 32.5 (L) g/dL      RDW 14.0 %      RDW-SD 50.4 fl      MPV 10.0 fL      Platelets 237 10*3/mm3      Neutrophil % 54.8 %      Lymphocyte % 27.6 %      Monocyte % 10.6 %      Eosinophil % 5.4 (H) %      Basophil % 0.9 %      Immature Grans % 0.7 %      Neutrophils, Absolute 3.16 10*3/mm3      Lymphocytes, Absolute 1.59 10*3/mm3      Monocytes, Absolute 0.61 10*3/mm3      Eosinophils, Absolute 0.31 10*3/mm3      Basophils, Absolute 0.05 10*3/mm3      Immature Grans, Absolute 0.04 (H) 10*3/mm3      nRBC 0.0 /100 WBC     POC Glucose Once [[id number]]  (Abnormal) Collected:  11/02/18 1953    Specimen:  Blood Updated:  11/02/18 2004     Glucose 159 (H) mg/dL      Comment: : [id number] Ron PiñaandaMeter ID: [id number]       POC Glucose Once [[id number]]  (Abnormal) Collected:  11/02/18 1707    Specimen:  Blood Updated:  11/02/18 1720     Glucose 175 (H) mg/dL      Comment: : [id number] Fadi CardozoMeter ID: [id number]       POC Glucose Once [[id number]]  (Abnormal) Collected:  11/02/18 1204    Specimen:  Blood Updated:  11/02/18 1225     Glucose 173 (H) mg/dL      Comment: : [id number] Fadi CardozoMeter ID: [id number]              Cultures:       Radiology Data:    Imaging Results (last 24 hours)     Procedure Component Value Units Date/Time    US Venous Doppler Lower Extremity Bilateral (duplex) [[id number]] Collected:  11/02/18 1525     Updated:  11/02/18 1529    Narrative:       History: Swelling       Impression:        Impression: There is evidence of deep venous thrombosis in both lower  extremities.     Comments: Bilateral lower extremity venous duplex exam was performed  using color Doppler flow, Doppler waveform analysis, and grayscale  imaging, with and without compression. There is evidence of deep venous  thrombosis in the right lower extremity common femoral vein. There is  also evidence of deep venous thrombosis in the left lower extremity  superficial femoral and popliteal veins. No thrombus is identified in  the saphenofemoral junctions and greater saphenous veins bilaterally.         This report was finalized on 11/02/2018 15:26 by Dr. Yo Cohen MD.          No Known Allergies    Scheduled meds:     aspirin 81 mg Oral Daily   atorvastatin 80 mg Oral Nightly   carbidopa-levodopa 2 tablet Oral TID   clopidogrel 75 mg Oral Daily   enoxaparin 1 mg/kg Subcutaneous Q12H   escitalopram 20 mg Oral Daily   gabapentin 300 mg Oral Nightly   hydrALAZINE 25 mg Oral BID   insulin detemir 40 Units Subcutaneous Nightly   insulin lispro 2-9 Units Subcutaneous 4x Daily With Meals & Nightly   losartan 100 mg Oral Q24H   pantoprazole 40 mg Oral Q AM   sodium chloride 3 mL Intravenous Q12H   traZODone 150 mg Oral Nightly       PRN meds:  •  acetaminophen  •  dextrose  •  dextrose  •  glucagon (human recombinant)  •  ipratropium-albuterol  •  ondansetron **OR** ondansetron ODT **OR** ondansetron  •  sodium chloride    Assessment/Plan       Cerebrovascular accident (CVA) (CMS/HCC)    Bilateral pulmonary embolism (CMS/HCC)    Deep vein thrombosis (DVT) of lower extremity (CMS/HCC)    DM (diabetes mellitus) (CMS/HCC)    Parkinsonism (CMS/HCC)      Plan:  Bilateral pulmonary embolus/bilateral DVT lower extremities.  Therapeutic Lovenox.  Consult cardiology possible right heart strain.  Patient is not a candidate EKOS due to recent CVA.  Consult vascular for evaluation of possible IVC filter.     Near-syncope with exertion/room air O2  sats 87%.  Troponin stable.  Echocardiogram shows ejection fraction 65%, right ventricle cavity is moderately to severely dilated/severe reduction of right ventricular systolic function/concentric hypertrophy.     Diabetes insulin-dependent.  Continue Levemir nightly and sliding scale.  Hemoglobin A1c 8.1.     Parkinsonism.  Continue Sinemet.     Chronic renal failure stage III.       Depression.  Continue Lexapro.  Continue trazodone at night.     Hypertension.  Continue Cozaar.     Neuropathy.  Continue Neurontin.     Anemia.  Anemia panel.  No sign of acute bleed.     History of CVA in august.  Left-sided paralysis.  Continue aspirin, Plavix, and Lipitor.    Bilateral heel ulcers/pressure ulcer coccyx -consult wound care.     Deconditioning.  PT and OT consult.      Discharge [facility]-4 days. Transfer to medical floor.     Lito Odom MD   18   9:21 AM

## 2018-11-03 NOTE — CONSULTS
Angelika Sapp  [id number]  [id number]  410/1  Lito Odom MD  11/1/2018      HPI: Angelika Sapp is a 78 y.o. male who presented with shortness of breath while doing physical therapy at his nursing facility.  He was transferred to an outside hospital and CT of the chest showed pulmonary embolism as per the report.  He was transferred here to [facility] for further care.  Echo did show some right heart strain and the patient was seen by cardiology however he was not a candidate for pulmonary thrombolysis given recent ischemic stroke.  As such she was started on therapeutic Lovenox.  Lower extremity venous duplex was also done which did not show DVT in the left mid femoral vein and popliteal vein as well as in the distal right common femoral vein.  Vascular no consulted for possible IVC filter.  When seen patient was on the floor having just been transferred out of the ICU.  He reports feeling well with no chest pain or shortness of breath.  He denies any prior history of DVT.  He was not previously on anticoagulation.  He is otherwise without complaint.    Past Medical History:   Diagnosis Date   • Arthritis    • Chronic kidney disease (CKD), stage III (moderate) (CMS/HCC)    • CVA (cerebral vascular accident) (CMS/HCC)     Right lacunar infarction with extension into right paraventricular hemisphere   • Depressive disorder    • GERD (gastroesophageal reflux disease)    • Hemiplegia (CMS/HCC)    • Hyperlipidemia    • Hypertension    • Insulin dependent diabetes mellitus (CMS/HCC)    • Parkinson's disease (CMS/HCC)        History reviewed. No pertinent surgical history.    Family History   Problem Relation Age of Onset   • Alzheimer's disease Mother    • Alcohol abuse Father        Social History     Social History   • Marital status:      Spouse name: N/A   • Number of children: N/A   • Years of education: N/A     Occupational History   • Not on file.     Social History Main Topics   • Smoking status: Never  Smoker   • Smokeless tobacco: Former User     Types: Chew   • Alcohol use No   • Drug use: No   • Sexual activity: Not on file     Other Topics Concern   • Not on file     Social History Narrative   • No narrative on file       No Known Allergies    Hospital Medications (active)       Dose Frequency Start End    acetaminophen (TYLENOL) tablet 650 mg 650 mg Every 4 Hours PRN 11/1/2018     Sig - Route: Take 2 tablets by mouth Every 4 (Four) Hours As Needed for Headache or Fever (temperature greater than 101.5 F). - Oral    aspirin chewable tablet 81 mg 81 mg Daily 11/1/2018     Sig - Route: Chew 1 tablet Daily. - Oral    atorvastatin (LIPITOR) tablet 80 mg 80 mg Nightly 11/1/2018     Sig - Route: Take 2 tablets by mouth Every Night. - Oral    carbidopa-levodopa (SINEMET)  MG per tablet 2 tablet 2 tablet 3 Times Daily 11/1/2018     Sig - Route: Take 2 tablets by mouth 3 (Three) Times a Day. - Oral    clopidogrel (PLAVIX) tablet 75 mg 75 mg Daily 11/1/2018     Sig - Route: Take 1 tablet by mouth Daily. - Oral    dextrose (D50W) 25 g/ 50mL Intravenous Solution 25 g 25 g Every 15 Minutes PRN 11/1/2018     Sig - Route: Infuse 50 mL into a venous catheter Every 15 (Fifteen) Minutes As Needed for Low Blood Sugar (Blood Sugar Less Than 70). - Intravenous    dextrose (GLUTOSE) oral gel 15 g 15 g Every 15 Minutes PRN 11/1/2018     Sig - Route: Take 15 g by mouth Every 15 (Fifteen) Minutes As Needed for Low Blood Sugar (Blood sugar less than 70). - Oral    enoxaparin (LOVENOX) syringe 100 mg 1 mg/kg × 97 kg Every 12 Hours Scheduled 11/2/2018     Sig - Route: Inject 1 mL under the skin into the appropriate area as directed Every 12 (Twelve) Hours. - Subcutaneous    escitalopram (LEXAPRO) tablet 20 mg 20 mg Daily 11/1/2018     Sig - Route: Take 2 tablets by mouth Daily. - Oral    gabapentin (NEURONTIN) capsule 300 mg 300 mg Nightly 11/1/2018     Sig - Route: Take 1 capsule by mouth Every Night. - Oral    glucagon (human  "recombinant) (GLUCAGEN DIAGNOSTIC) injection 1 mg 1 mg As Needed 11/1/2018     Sig - Route: Inject 1 mg under the skin into the appropriate area as directed As Needed (Blood Glucose Less Than 70). - Subcutaneous    hydrALAZINE (APRESOLINE) tablet 25 mg 25 mg 2 Times Daily 11/1/2018     Sig - Route: Take 1 tablet by mouth 2 (Two) Times a Day. - Oral    insulin detemir (LEVEMIR) injection 40 Units 40 Units Nightly 11/1/2018     Sig - Route: Inject 40 Units under the skin into the appropriate area as directed Every Night. - Subcutaneous    insulin lispro (humaLOG) injection 2-9 Units 2-9 Units 4 Times Daily With Meals & Nightly 11/1/2018     Sig - Route: Inject 2-9 Units under the skin into the appropriate area as directed 4 (Four) Times a Day With Meals & at Bedtime. - Subcutaneous    ipratropium-albuterol (DUO-NEB) nebulizer solution 3 mL 3 mL Every 6 Hours PRN 11/1/2018     Sig - Route: Take 3 mL by nebulization Every 6 (Six) Hours As Needed for Wheezing or Shortness of Air. - Nebulization    losartan (COZAAR) tablet 100 mg 100 mg Every 24 Hours Scheduled 11/1/2018     Sig - Route: Take 2 tablets by mouth Daily. - Oral    ondansetron (ZOFRAN) injection 4 mg 4 mg Every 6 Hours PRN 11/1/2018     Sig - Route: Infuse 2 mL into a venous catheter Every 6 (Six) Hours As Needed for Nausea or Vomiting. - Intravenous    Linked Group 1:  \"Or\" Linked Group Details        ondansetron (ZOFRAN) tablet 4 mg 4 mg Every 6 Hours PRN 11/1/2018     Sig - Route: Take 1 tablet by mouth Every 6 (Six) Hours As Needed for Nausea or Vomiting. - Oral    Linked Group 1:  \"Or\" Linked Group Details        ondansetron ODT (ZOFRAN-ODT) disintegrating tablet 4 mg 4 mg Every 6 Hours PRN 11/1/2018     Sig - Route: Take 1 tablet by mouth Every 6 (Six) Hours As Needed for Nausea or Vomiting. - Oral    Linked Group 1:  \"Or\" Linked Group Details        pantoprazole (PROTONIX) EC tablet 40 mg 40 mg Every Early Morning 11/2/2018     Sig - Route: Take 1 " tablet by mouth Every Morning. - Oral    sodium chloride 0.9 % flush 3 mL 3 mL Every 12 Hours Scheduled 11/1/2018     Sig - Route: Infuse 3 mL into a venous catheter Every 12 (Twelve) Hours. - Intravenous    sodium chloride 0.9 % flush 3-10 mL 3-10 mL As Needed 11/1/2018     Sig - Route: Infuse 3-10 mL into a venous catheter As Needed for Line Care. - Intravenous    traZODone (DESYREL) tablet 150 mg 150 mg Nightly 11/1/2018     Sig - Route: Take 1 tablet by mouth Every Night. - Oral          Review of Systems   Constitutional: Negative for chills, diaphoresis, fatigue and fever.   HENT: Negative for congestion, sneezing and sore throat.    Eyes: Negative.    Respiratory: Negative for chest tightness, shortness of breath, wheezing and stridor.    Cardiovascular: Positive for leg swelling (Mild bilateral lower extremity edema to the level of the midcalf). Negative for chest pain.   Gastrointestinal: Negative.    Endocrine: Negative.    Genitourinary: Negative.    Musculoskeletal: Negative.    Skin: Negative.    Allergic/Immunologic: Negative.    Neurological: Negative.  Negative for dizziness, syncope, speech difficulty and light-headedness.   Hematological: Negative.    Psychiatric/Behavioral: Negative.        Physical Exam   Constitutional: He is oriented to person, place, and time. He appears well-developed and well-nourished.   HENT:   Head: Normocephalic and atraumatic.   Eyes: Pupils are equal, round, and reactive to light. EOM are normal.   Neck: Normal range of motion. Neck supple. No JVD present.   Cardiovascular: Normal rate, regular rhythm, intact distal pulses and normal pulses.    Pulses:       Carotid pulses are 2+ on the right side, and 2+ on the left side.       Radial pulses are 2+ on the right side, and 2+ on the left side.        Femoral pulses are 2+ on the right side, and 2+ on the left side.       Popliteal pulses are 2+ on the right side, and 2+ on the left side.        Dorsalis pedis pulses are  2+ on the right side, and 2+ on the left side.        Posterior tibial pulses are 2+ on the right side, and 2+ on the left side.   Pulmonary/Chest: Effort normal. No respiratory distress.   Abdominal: Soft. He exhibits no distension and no mass. There is no tenderness.   Musculoskeletal: Normal range of motion. He exhibits edema (Mild lower extremity edema bilaterally to the level of the proximal calf). He exhibits no tenderness or deformity.   Neurological: He is alert and oriented to person, place, and time. No sensory deficit. He exhibits normal muscle tone.   Skin: Skin is warm and dry. Capillary refill takes less than 2 seconds.   Psychiatric: He has a normal mood and affect. His behavior is normal. Judgment and thought content normal.   Vitals reviewed.      Laboratory Data:    Results from last 7 days  Lab Units 11/03/18 0424 11/02/18 0134   WBC 10*3/mm3 5.76 6.32   HEMOGLOBIN g/dL 10.8* 10.6*   HEMATOCRIT % 33.2* 32.4*   PLATELETS 10*3/mm3 237 255         Results from last 7 days  Lab Units 11/03/18 0424 11/02/18  0134   SODIUM mmol/L 140 137   POTASSIUM mmol/L 4.2 4.3   CHLORIDE mmol/L 108 107   CO2 mmol/L 25.0 22.0*   BUN mg/dL 24* 27*   CREATININE mg/dL 1.28 1.35   CALCIUM mg/dL 8.5 8.2*   BILIRUBIN mg/dL 0.4 0.3   ALK PHOS U/L 40 47   ALT (SGPT) U/L <15 <15   AST (SGOT) U/L 20 24   GLUCOSE mg/dL 75 218*       Results from last 7 days  Lab Units 11/01/18 2015   INR  1.00         Diagnostic Data:  Imaging Results (last 24 hours)     Procedure Component Value Units Date/Time    US Venous Doppler Lower Extremity Bilateral (duplex) [[id number]] Collected:  11/02/18 1525     Updated:  11/02/18 1529    Narrative:       History: Swelling       Impression:       Impression: There is evidence of deep venous thrombosis in both lower  extremities.     Comments: Bilateral lower extremity venous duplex exam was performed  using color Doppler flow, Doppler waveform analysis, and grayscale  imaging, with and without  compression. There is evidence of deep venous  thrombosis in the right lower extremity common femoral vein. There is  also evidence of deep venous thrombosis in the left lower extremity  superficial femoral and popliteal veins. No thrombus is identified in  the saphenofemoral junctions and greater saphenous veins bilaterally.         This report was finalized on 11/02/2018 15:26 by Dr. Yo Cohen MD.          Impression: 78-year-old male admitted with shortness of breath and bilateral pulmonary emboli as well as finding of bilateral lower extremity DVT    Cerebrovascular accident (CVA) (CMS/HCC)    Bilateral pulmonary embolism (CMS/HCC)    Deep vein thrombosis (DVT) of lower extremity (CMS/HCC)    DM (diabetes mellitus) (CMS/HCC)    Parkinsonism (CMS/HCC)      Plan: After thoroughly evaluating Angelika Sapp, I believe the best course of action is to remain conservative from a vascular standpoint.  I would recommend to continue therapeutic anticoagulation as already started as patient has tolerated that thus far and has no absolute contraindication.  He can be transitioned to oral anticoagulants at any point as per the primary team.  Given his hemodynamic stability and ability to tolerate anticoagulation I would not recommend IVC filter placement at this time.  Should in the future it be deemed that he is unable to be anticoagulated for any reason that we can revisit discussion of an IVC filter.  He otherwise is stable for discharge from a vascular standpoint.  We will see Angelika Sapp in the office in 6 months with repeat noninvasive testing for continued surveillance.  Thank you for the consult and please feel free to reach out with any questions or concerns.    Matias Bartlett MD  Vascular Surgery  [phone]

## 2018-11-03 NOTE — PLAN OF CARE
Problem: Patient Care Overview  Goal: Plan of Care Review  Outcome: Ongoing (interventions implemented as appropriate)      Problem: Syncope (Adult)  Goal: Identify Related Risk Factors and Signs and Symptoms  Outcome: Ongoing (interventions implemented as appropriate)    Goal: Physical Safety/Health Maintenance  Outcome: Ongoing (interventions implemented as appropriate)    Goal: Optimal Emotional/Functional [place]  Outcome: Ongoing (interventions implemented as appropriate)      Problem: Cardiac: ACS (Acute Coronary Syndrome) (Adult)  Goal: Signs and Symptoms of Listed Potential Problems Will be Absent, Minimized or Managed (Cardiac: ACS)  Outcome: Ongoing (interventions implemented as appropriate)      Problem: Skin Injury Risk (Adult)  Goal: Identify Related Risk Factors and Signs and Symptoms  Outcome: Ongoing (interventions implemented as appropriate)    Goal: Skin Health and Integrity  Outcome: Ongoing (interventions implemented as appropriate)      Problem: Wound (Includes Pressure Injury) (Adult)  Goal: Signs and Symptoms of Listed Potential Problems Will be Absent, Minimized or Managed (Wound)  Outcome: Ongoing (interventions implemented as appropriate)      Problem: VTE, DVT and PE (Adult)  Goal: Signs and Symptoms of Listed Potential Problems Will be Absent, Minimized or Managed (VTE, DVT and PE)  Outcome: Ongoing (interventions implemented as appropriate)      Problem: Fall Risk (Adult)  Goal: Identify Related Risk Factors and Signs and Symptoms  Outcome: Ongoing (interventions implemented as appropriate)    Goal: Absence of Fall  Outcome: Ongoing (interventions implemented as appropriate)

## 2018-11-04 VITALS
SYSTOLIC BLOOD PRESSURE: 135 MMHG | WEIGHT: 227.4 LBS | BODY MASS INDEX: 33.68 KG/M2 | HEIGHT: 69 IN | RESPIRATION RATE: 16 BRPM | DIASTOLIC BLOOD PRESSURE: 63 MMHG | HEART RATE: 55 BPM | TEMPERATURE: 97.9 F | OXYGEN SATURATION: 94 %

## 2018-11-04 LAB
ALBUMIN SERPL-MCNC: 2.9 G/DL (ref 3.5–5)
ALBUMIN/GLOB SERPL: 1.2 G/DL (ref 1.1–2.5)
ALP SERPL-CCNC: 44 U/L (ref 24–120)
ALT SERPL W P-5'-P-CCNC: <15 U/L (ref 0–54)
ANION GAP SERPL CALCULATED.3IONS-SCNC: 10 MMOL/L (ref 4–13)
AST SERPL-CCNC: 18 U/L (ref 7–45)
BASOPHILS # BLD AUTO: 0.04 10*3/MM3 (ref 0–0.2)
BASOPHILS NFR BLD AUTO: 0.8 % (ref 0–2)
BILIRUB SERPL-MCNC: 0.5 MG/DL (ref 0.1–1)
BUN BLD-MCNC: 19 MG/DL (ref 5–21)
BUN/CREAT SERPL: 15.3 (ref 7–25)
CALCIUM SPEC-SCNC: 8.9 MG/DL (ref 8.4–10.4)
CHLORIDE SERPL-SCNC: 105 MMOL/L (ref 98–110)
CO2 SERPL-SCNC: 24 MMOL/L (ref 24–31)
CREAT BLD-MCNC: 1.24 MG/DL (ref 0.5–1.4)
DEPRECATED RDW RBC AUTO: 48.9 FL (ref 40–54)
EOSINOPHIL # BLD AUTO: 0.3 10*3/MM3 (ref 0–0.7)
EOSINOPHIL NFR BLD AUTO: 6 % (ref 0–4)
ERYTHROCYTE [DISTWIDTH] IN BLOOD BY AUTOMATED COUNT: 13.8 % (ref 12–15)
GFR SERPL CREATININE-BSD FRML MDRD: 56 ML/MIN/1.73
GLOBULIN UR ELPH-MCNC: 2.4 GM/DL
GLUCOSE BLD-MCNC: 114 MG/DL (ref 70–100)
GLUCOSE BLDC GLUCOMTR-MCNC: 101 MG/DL (ref 70–130)
GLUCOSE BLDC GLUCOMTR-MCNC: 206 MG/DL (ref 70–130)
HCT VFR BLD AUTO: 32.7 % (ref 40–52)
HGB BLD-MCNC: 10.8 G/DL (ref 14–18)
IMM GRANULOCYTES # BLD: 0.03 10*3/MM3 (ref 0–0.03)
IMM GRANULOCYTES NFR BLD: 0.6 % (ref 0–5)
LYMPHOCYTES # BLD AUTO: 1.39 10*3/MM3 (ref 0.72–4.86)
LYMPHOCYTES NFR BLD AUTO: 27.7 % (ref 15–45)
MCH RBC QN AUTO: 31.7 PG (ref 28–32)
MCHC RBC AUTO-ENTMCNC: 33 G/DL (ref 33–36)
MCV RBC AUTO: 95.9 FL (ref 82–95)
MONOCYTES # BLD AUTO: 0.54 10*3/MM3 (ref 0.19–1.3)
MONOCYTES NFR BLD AUTO: 10.8 % (ref 4–12)
NEUTROPHILS # BLD AUTO: 2.71 10*3/MM3 (ref 1.87–8.4)
NEUTROPHILS NFR BLD AUTO: 54.1 % (ref 39–78)
NRBC BLD MANUAL-RTO: 0 /100 WBC (ref 0–0)
PLATELET # BLD AUTO: 251 10*3/MM3 (ref 130–400)
PMV BLD AUTO: 9.8 FL (ref 6–12)
POTASSIUM BLD-SCNC: 4.5 MMOL/L (ref 3.5–5.3)
PROT SERPL-MCNC: 5.3 G/DL (ref 6.3–8.7)
RBC # BLD AUTO: 3.41 10*6/MM3 (ref 4.8–5.9)
SODIUM BLD-SCNC: 139 MMOL/L (ref 135–145)
WBC NRBC COR # BLD: 5.01 10*3/MM3 (ref 4.8–10.8)

## 2018-11-04 PROCEDURE — 94799 UNLISTED PULMONARY SVC/PX: CPT

## 2018-11-04 PROCEDURE — 82962 GLUCOSE BLOOD TEST: CPT

## 2018-11-04 PROCEDURE — 94760 N-INVAS EAR/PLS OXIMETRY 1: CPT

## 2018-11-04 PROCEDURE — 85025 COMPLETE CBC W/AUTO DIFF WBC: CPT | Performed by: FAMILY MEDICINE

## 2018-11-04 PROCEDURE — 80053 COMPREHEN METABOLIC PANEL: CPT | Performed by: FAMILY MEDICINE

## 2018-11-04 PROCEDURE — 63710000001 INSULIN LISPRO (HUMAN) PER 5 UNITS: Performed by: NURSE PRACTITIONER

## 2018-11-04 PROCEDURE — 25010000002 ENOXAPARIN PER 10 MG: Performed by: NURSE PRACTITIONER

## 2018-11-04 RX ORDER — POLYETHYLENE GLYCOL 3350 17 G/17G
17 POWDER, FOR SOLUTION ORAL DAILY PRN
COMMUNITY
End: 2018-11-07

## 2018-11-04 RX ORDER — NICOTINE POLACRILEX 4 MG
37.5 LOZENGE BUCCAL AS NEEDED
COMMUNITY

## 2018-11-04 RX ORDER — ACETAMINOPHEN 325 MG/1
650 TABLET ORAL EVERY 6 HOURS PRN
COMMUNITY

## 2018-11-04 RX ADMIN — ASPIRIN 81 MG CHEWABLE TABLET 81 MG: 81 TABLET CHEWABLE at 09:30

## 2018-11-04 RX ADMIN — ENOXAPARIN SODIUM 100 MG: 100 INJECTION SUBCUTANEOUS at 05:48

## 2018-11-04 RX ADMIN — PANTOPRAZOLE SODIUM 40 MG: 40 TABLET, DELAYED RELEASE ORAL at 05:48

## 2018-11-04 RX ADMIN — CLOPIDOGREL 75 MG: 75 TABLET, FILM COATED ORAL at 09:55

## 2018-11-04 RX ADMIN — Medication 10 ML: at 09:30

## 2018-11-04 RX ADMIN — INSULIN LISPRO 4 UNITS: 100 INJECTION, SOLUTION INTRAVENOUS; SUBCUTANEOUS at 12:25

## 2018-11-04 RX ADMIN — HYDRALAZINE HYDROCHLORIDE 25 MG: 25 TABLET, FILM COATED ORAL at 09:30

## 2018-11-04 RX ADMIN — CARBIDOPA AND LEVODOPA 2 TABLET: 25; 100 TABLET ORAL at 09:30

## 2018-11-04 RX ADMIN — ESCITALOPRAM 20 MG: 10 TABLET, FILM COATED ORAL at 09:30

## 2018-11-04 RX ADMIN — LOSARTAN POTASSIUM 100 MG: 50 TABLET ORAL at 09:30

## 2018-11-04 NOTE — PLAN OF CARE
Problem: Patient Care Overview  Goal: Plan of Care Review  Outcome: Ongoing (interventions implemented as appropriate)   11/04/18 0442   Coping/Psychosocial   Plan of Care Reviewed With patient   Plan of Care Review   Progress no change   OTHER   Outcome Summary Pt denies pain. Turn Q2H. Safety maintained. VSS. NSR on tele. Labs this AM. Float heels. Will continue to monitor.      Goal: Individualization and Mutuality  Outcome: Ongoing (interventions implemented as appropriate)    Goal: Discharge Needs Assessment  Outcome: Ongoing (interventions implemented as appropriate)      Problem: Syncope (Adult)  Goal: Identify Related Risk Factors and Signs and Symptoms  Outcome: Outcome(s) achieved Date Met: 11/04/18    Goal: Physical Safety/Health Maintenance  Outcome: Ongoing (interventions implemented as appropriate)    Goal: Optimal Emotional/Functional [place]  Outcome: Ongoing (interventions implemented as appropriate)      Problem: Cardiac: ACS (Acute Coronary Syndrome) (Adult)  Goal: Signs and Symptoms of Listed Potential Problems Will be Absent, Minimized or Managed (Cardiac: ACS)  Outcome: Ongoing (interventions implemented as appropriate)      Problem: Skin Injury Risk (Adult)  Goal: Skin Health and Integrity  Outcome: Ongoing (interventions implemented as appropriate)      Problem: Wound (Includes Pressure Injury) (Adult)  Goal: Signs and Symptoms of Listed Potential Problems Will be Absent, Minimized or Managed (Wound)  Outcome: Ongoing (interventions implemented as appropriate)      Problem: VTE, DVT and PE (Adult)  Goal: Signs and Symptoms of Listed Potential Problems Will be Absent, Minimized or Managed (VTE, DVT and PE)  Outcome: Ongoing (interventions implemented as appropriate)      Problem: Fall Risk (Adult)  Goal: Absence of Fall  Outcome: Ongoing (interventions implemented as appropriate)

## 2018-11-04 NOTE — DISCHARGE SUMMARY
"    [facility] Medicine Services  DISCHARGE SUMMARY       Date of Admission: 11/1/2018  Date of Discharge:  11/4/2018  Primary Care Physician: Sergio Ma MD    Presenting Problem/History of Present Illness:  Bilateral pulmonary embolism (CMS/[facility]) [I26.99]     Final Discharge Diagnoses:  Active Hospital Problems    Diagnosis   • Deep vein thrombosis (DVT) of lower extremity (CMS/[facility])   • DM (diabetes mellitus) (CMS/[facility])   • Parkinsonism (CMS/[facility])   • Bilateral pulmonary embolism (CMS/[facility])   • Cerebrovascular accident (CVA) (CMS/[facility])       Consults: Cardiology and vascular.    Pertinent Test Results:  Impression: Doppler ultrasound of bilateral lower extremities   There is evidence of deep venous thrombosis in both lower  Extremities.    Interpretation Summary echocardiogram    · Left ventricular wall thickness is consistent with mild-to-moderate concentric hypertrophy.  · Left ventricular systolic function is normal. Estimated EF = 65%.  · Right ventricular cavity is moderate-to-severely dilated.  · Severely reduced right ventricular systolic function noted.       Chief Complaint on Day of Discharge: none    History of Present Illness on Day of Discharge:   Angelika Sapp is a 78-year-old  male with a past medical history of right lacunar infarction with extension into the right paraventricular hemisphere 8/11/2018-with hemiplegia now wheelchair bound, insulin-dependent diabetes, chronic kidney disease stage III, hyperlipidemia, hypertension, Parkinson's disease.  Patient was discharged from this facility on 8/14/2018-[address] [place] [address] nursing home subsequently transferred to [facility] in [facility].  Patient was working with physical therapy today when he had a syncopal episode complaining of dizziness and \"almost passed out\".  Information obtained from transfer documentation.  Currently patient states he has no memory of incident.  Initial room air saturation was 87%.  " This increased to 95% on 2 L.  Patient was transferred to [facility] where workup revealed bilateral pulmonary emboli.  Patient has subsequently been transferred to [facility] for higher level of care.  Currently he is using oxygen and has no complaints of shortness of breathing.  He is alert and oriented.  He denies chest pain or abdominal pain.  He states he has chronic tremors of right upper extremity from his Parkinson's disease.  Patient does have multiple areas of skin changes secondary to pressure.  He is without distress.  He is admitted for further evaluation and treatment.     Hospital Course:  The patient is a 78 y.o. male who presented to [facility] with bilateral pulmonary embolus/DVT/near-syncope.      Bilateral pulmonary embolus/bilateral DVT lower extremities.  Therapeutic Lovenox started.  Cardiologist was consulted for possible right heart strain.  Patient is not a candidate for EKOS procedure d/t recent CVA and being 79 y/o.    Vascular surgery believes the best course of action is to remain conservative from a vascular standpoint. Pt will transition to oral Eliquis.  Given his hemodynamic stability and ability to tolerate anticoagulation vascular would not recommend IVC filter placement at this time.    Troponin stable.  Echocardiogram shows ejection fraction 65%, right ventricle cavity is moderately to severely dilated/severe reduction of right ventricular systolic function/concentric hypertrophy.     Diabetes insulin-dependent.  Patient go back to insulin from nursing home. Hemoglobin A1c 8.1.     Parkinsonism.  Patient continue Sinemet.     Chronic renal failure stage III.  Kidney function stable.     Depression.  Patient continue Lexapro, and trazodone at night.     Hypertension.  Patient continue Cozaar.     Neuropathy.  Patient continue Neurontin.     Anemia.  Hemoglobin stable.  No sign of acute bleed.     History of CVA in august.  Left-sided  "paralysis.  Patient to continue Plavix, and Lipitor.     Bilateral heel ulcers/pressure ulcer coccyx -patient continue wound care at the nursing home.     Vital signs stable, afebrile.  Plan to discharge patient back to nursing home.  Patient continue physical therapy.  Patient was started on the Eliquis with Plavix.  Patient follow-up with nursing home physician as soon as able.    Condition on Discharge: Stable    Physical Exam on Discharge:  /63 (BP Location: Right arm, Patient Position: Lying)   Pulse 55   Temp 97.9 °F (36.6 °C) (Temporal Artery )   Resp 16   Ht 175.3 cm (69\")   Wt 103 kg (227 lb 6.4 oz)   SpO2 97%   BMI 33.58 kg/m²   Physical Exam  Constitutional: He is oriented to person, place, and time. He appears well-developed and well-nourished.   HENT:   Head: Normocephalic and atraumatic.   Eyes: Pupils are equal, round, and reactive to light. Conjunctivae and EOM are normal.   Neck: Neck supple. No JVD present. No thyromegaly present.   Cardiovascular: Normal rate, regular rhythm, normal heart sounds and intact distal pulses.  Exam reveals no gallop and no friction rub.    No murmur heard.  Pulmonary/Chest: Effort normal and breath sounds normal. No respiratory distress. He has no wheezes. He has no rales. He exhibits no tenderness.   Decrease in breath sound bilateral.   Abdominal: Soft. Bowel sounds are normal. He exhibits no distension. There is no tenderness. There is no rebound and no guarding.   Musculoskeletal: He exhibits edema (trace to +1 edema, left lower extremities). He exhibits no tenderness or deformity.   Left-sided paralysis.   Lymphadenopathy:     He has no cervical adenopathy.   Neurological: He is alert and oriented to person, place, and time. He displays normal reflexes. No cranial nerve deficit. He exhibits abnormal muscle tone. Coordination abnormal.   Left-sided paralysis   Skin: Skin is warm and dry. Capillary refill takes 2 to 3 seconds. No rash noted. No " erythema.   Psychiatric: He has a normal mood and affect. His behavior is normal. Judgment and thought content normal.   Nursing note and vitals reviewed.    Discharge Disposition:  Skilled Nursing Facility (DC - External)    Discharge Medications:     Discharge Medications      New Medications      Instructions Start Date   apixaban 5 MG tablet tablet  Commonly known as:  ELIQUIS   10 mg, Oral, Every 12 Hours Scheduled, 10 mg bid for 7 days. Then 5 mg bid .         Continue These Medications      Instructions Start Date   acetaminophen 325 MG tablet  Commonly known as:  TYLENOL   650 mg, Oral, Every 6 Hours PRN      APIDRA 100 UNIT/ML injection  Generic drug:  insulin glulisine   35 Units, Subcutaneous, Daily With Breakfast      atorvastatin 80 MG tablet  Commonly known as:  LIPITOR   80 mg, Oral, Nightly      carbidopa-levodopa  MG per tablet  Commonly known as:  SINEMET   2 tablets, Oral, 3 Times Daily      clopidogrel 75 MG tablet  Commonly known as:  PLAVIX   75 mg, Oral, Daily      escitalopram 20 MG tablet  Commonly known as:  LEXAPRO   20 mg, Oral, Daily      gabapentin 300 MG capsule  Commonly known as:  NEURONTIN   300 mg, Oral, Nightly      GLUCAGEN HYPOKIT 1 MG injection  Generic drug:  glucagon   1 mg, Intravenous, As Needed, Give if not able to swallow.       GLUTOSE 15 40 % gel  Generic drug:  dextrose   37.5 g, Oral, As Needed, Give if able to swallow.       HUMALOG KWIKPEN 100 UNIT/ML solution pen-injector  Generic drug:  Insulin Lispro   Subcutaneous, As Needed, Sliding Scale: 201-250 = 4 units. 251-300 = 7 units. 301-350 = 10 units.      hydrALAZINE 25 MG tablet  Commonly known as:  APRESOLINE   25 mg, Oral, 2 Times Daily      insulin detemir 100 UNIT/ML injection  Commonly known as:  LEVEMIR   40 Units, Subcutaneous, Nightly      olmesartan 40 MG tablet  Commonly known as:  BENICAR   40 mg, Oral, Daily      pantoprazole 40 MG EC tablet  Commonly known as:  PROTONIX   40 mg, Oral, Daily       polyethylene glycol packet  Commonly known as:  MIRALAX   17 g, Oral, Daily PRN      traZODone 150 MG tablet  Commonly known as:  DESYREL   150 mg, Oral, Nightly      TRIGLIDE 160 MG tablet  Generic drug:  fenofibrate   160 mg, Oral, Nightly      vitamin B-12 250 MCG tablet  Commonly known as:  CYANOCOBALAMIN   250 mcg, Oral, Daily         Stop These Medications    aspirin 81 MG chewable tablet            Discharge Diet:   Diet Instructions     Advance Diet As Tolerated             Activity at Discharge:   Activity Instructions     Activity as Tolerated             Discharge Care Plan/Instructions:     Follow-up Appointments:   Future Appointments  Date Time Provider Department Center   11/7/2018 1:20 PM Aster Russ APRN [facility] None   Follow-up with nursing home physician as soon as able.      Lito Odom MD  11/04/18  2:01 PM    Time: Greater than 30 minutes

## 2018-11-04 NOTE — PLAN OF CARE
Problem: Patient Care Overview  Goal: Plan of Care Review  Outcome: Outcome(s) achieved Date Met: 11/03/18 11/03/18 1950   Coping/Psychosocial   Plan of Care Reviewed With patient   OTHER   Outcome Summary Cardiac/CCHO diet. oral intake insufficient at this time. Encouraged oral intake. Addign Boost Glucose control BID. con to follow       Problem: Nutrition, Imbalanced: Inadequate Oral Intake (Adult)  Goal: Identify Related Risk Factors and Signs and Symptoms  Outcome: Outcome(s) achieved Date Met: 11/03/18 11/03/18 1950   Nutrition, Imbalanced: Inadequate Oral Intake (Adult)   Related Risk Factors (Nutrition Imbalance, Inadequate Oral Intake) chronic illness/infection   Signs and Symptoms (Nutrition Imbalance, Inadequate Oral Intake: Signs and Symptoms) weakness/lethargy;edema/ascites

## 2018-11-04 NOTE — DISCHARGE PLACEMENT REQUEST
"To:  Angelika Sanchez (78 y.o. Male)     Date of Birth Social Security Number Address Home Phone MRN    1940  [address] [facility] [address] [phone] [id number]    Bahai Marital Status          Other        Admission Date Admission Type Admitting Provider Attending Provider Department, Room/Bed    11/1/18 Urgent Lito Odom MD Truong, Khai C, MD [facility] 4B, 410/1    Discharge Date Discharge Disposition Discharge Destination         Skilled Nursing Facility (DC - External)              Attending Provider:  Lito Odom MD    Allergies:  No Known Allergies    Isolation:  None   Infection:  None   Code Status:  CPR    Ht:  175.3 cm (69\")   Wt:  103 kg (227 lb 6.4 oz)    Admission Cmt:  None   Principal Problem:  None                Active Insurance as of 11/1/2018     Primary Coverage     Payor Plan Insurance Group Employer/Plan Group    MEDICARE MEDICARE A & B      Payor Plan Address Payor Plan Phone Number Effective From Effective To    [address] [phone] 2/1/2003     [facility] [id number]       Subscriber Name Subscriber Birth Date Member ANGELIKA VARGAS 1940 [id number]           Secondary Coverage     Payor Plan Insurance Group Employer/Plan Group    AETNA COMMERCIAL AETNA [facility] [id number]     Payor Plan Address Payor Plan Phone Number Effective From Effective To    [address] [phone] 1/1/2013     [facility] [id number]       Subscriber Name Subscriber Birth Date Member ANGELIKA VARGAS 1940 [id number]                 Emergency Contacts      (Rel.) Home Phone Work Phone Mobile Phone    Patricia Prieto (Spouse) [phone] -- --    ManojFreddy (Son) -- [phone] --              "

## 2018-11-04 NOTE — PROGRESS NOTES
Continued Stay Note   [place]     Patient Name: Angleika Sapp  MRN: [id number]  Today's Date: 11/4/2018    Admit Date: 11/1/2018          Discharge Plan     Row Name 11/04/18 1454       Plan    Patient/Family in Agreement with Plan yes    Final Discharge Disposition Code 03 - skilled nursing facility (SNF)    Final Note MARIANA verified with [facility] that pt can dc today.  MARIANA has faxed dc paperwork.  RN to call report.  GWEN Mckinney.              Discharge Codes    No documentation.       Expected Discharge Date and Time     Expected Discharge Date Expected Discharge Time    Nov 4, 2018             GWEN Lares

## 2018-11-04 NOTE — PROGRESS NOTES
Continued Stay Note   [place]     Patient Name: Angelika Sapp  MRN: [id number]  Today's Date: 11/4/2018    Admit Date: 11/1/2018          Discharge Plan     Row Name 11/04/18 1459       Plan    Plan [facility] could not take pt so [facility] route to get pt.  GWEN Mckinney.    Patient/Family in Agreement with Plan yes    Row Name 11/04/18 1454       Plan    Patient/Family in Agreement with Plan yes    Final Discharge Disposition Code 03 - skilled nursing facility (SNF)    Final Note MARIANA verified with [facility] that pt can dc today.  MARIANA has faxed dc paperwork.  RN to call report.  GWEN Mckinney.              Discharge Codes    No documentation.       Expected Discharge Date and Time     Expected Discharge Date Expected Discharge Time    Nov 4, 2018             GWEN Lares

## 2018-11-04 NOTE — DISCHARGE PLACEMENT REQUEST
"To:  Dorian Mg  From:  Pavithra Wright, [facility]  [phone]      Angelika Sinclair (78 y.o. Male)     Date of Birth Social Security Number Address Home Phone MRN    1940  [address] [facility] [address] [phone] [id number]    Muslim Marital Status          Other        Admission Date Admission Type Admitting Provider Attending Provider Department, Room/Bed    11/1/18 Urgent Lito Odom MD Truong, Khai C, MD [facility] 4B, 410/1    Discharge Date Discharge Disposition Discharge Destination         Skilled Nursing Facility (AL - External)              Attending Provider:  Lito Odom MD    Allergies:  No Known Allergies    Isolation:  None   Infection:  None   Code Status:  CPR    Ht:  175.3 cm (69\")   Wt:  103 kg (227 lb 6.4 oz)    Admission Cmt:  None   Principal Problem:  None                Active Insurance as of 11/1/2018     Primary Coverage     Payor Plan Insurance Group Employer/Plan Group    MEDICARE MEDICARE A & B      Payor Plan Address Payor Plan Phone Number Effective From Effective To    [address] [phone] 2/1/2003     [facility] [id number]       Subscriber Name Subscriber Birth Date Member ID       ANGELIKA SINCLAIR 1940 [id number]           Secondary Coverage     Payor Plan Insurance Group Employer/Plan Group    AETNA COMMERCIAL AETNA [place] [id number]     Payor Plan Address Payor Plan Phone Number Effective From Effective To    [address] [phone] 1/1/2013     [facility] [id number]       Subscriber Name Subscriber Birth Date Member ID       ANGELIKA SINCLAIR 1940 [id number]                 Emergency Contacts      (Rel.) Home Phone Work Phone Mobile Phone    ConnerPatricia (Spouse) [phone] -- --    Freddy Aranda (Son) -- [phone] --                 Discharge Summary      Lito Odom MD at 11/4/2018  1:41 PM              [facility] Medicine Services  DISCHARGE SUMMARY       Date of " "Admission: 11/1/2018  Date of Discharge:  11/4/2018  Primary Care Physician: Sergio Ma MD    Presenting Problem/History of Present Illness:  Bilateral pulmonary embolism (CMS/[facility]) [I26.99]     Final Discharge Diagnoses:  Active Hospital Problems    Diagnosis   • Deep vein thrombosis (DVT) of lower extremity (CMS/[facility])   • DM (diabetes mellitus) (CMS/[facility])   • Parkinsonism (CMS/[facility])   • Bilateral pulmonary embolism (CMS/[facility])   • Cerebrovascular accident (CVA) (CMS/[facility])       Consults: Cardiology and vascular.    Pertinent Test Results:  Impression: Doppler ultrasound of bilateral lower extremities   There is evidence of deep venous thrombosis in both lower  Extremities.    Interpretation Summary echocardiogram    · Left ventricular wall thickness is consistent with mild-to-moderate concentric hypertrophy.  · Left ventricular systolic function is normal. Estimated EF = 65%.  · Right ventricular cavity is moderate-to-severely dilated.  · Severely reduced right ventricular systolic function noted.       Chief Complaint on Day of Discharge: none    History of Present Illness on Day of Discharge:   Angelika Sapp is a 78-year-old  male with a past medical history of right lacunar infarction with extension into the right paraventricular hemisphere 8/11/2018-with hemiplegia now wheelchair bound, insulin-dependent diabetes, chronic kidney disease stage III, hyperlipidemia, hypertension, Parkinson's disease.  Patient was discharged from this facility on 8/14/2018-[address] [place] [address] nursing home subsequently transferred to [facility] in [facility].  Patient was working with physical therapy today when he had a syncopal episode complaining of dizziness and \"almost passed out\".  Information obtained from transfer documentation.  Currently patient states he has no memory of incident.  Initial room air saturation was 87%.  This increased to 95% on 2 L.  Patient was transferred to [facility] where " workup revealed bilateral pulmonary emboli.  Patient has subsequently been transferred to [facility] for higher level of care.  Currently he is using oxygen and has no complaints of shortness of breathing.  He is alert and oriented.  He denies chest pain or abdominal pain.  He states he has chronic tremors of right upper extremity from his Parkinson's disease.  Patient does have multiple areas of skin changes secondary to pressure.  He is without distress.  He is admitted for further evaluation and treatment.     Hospital Course:  The patient is a 78 y.o. male who presented to [facility] with bilateral pulmonary embolus/DVT/near-syncope.      Bilateral pulmonary embolus/bilateral DVT lower extremities.  Therapeutic Lovenox started.  Cardiologist was consulted for possible right heart strain.  LUNA robison is not a candidate for EKOS procedure d/t recent CVA and being 77 y/o.    Vascular surgery believes the best course of action is to remain conservative from a vascular standpoint.  Pt will transition to oral Eliquis.  Given his hemodynamic stability and ability to tolerate anticoagulation vascular would not recommend IVC filter placement at this time.    Troponin stable.  Echocardiogram shows ejection fraction 65%, right ventricle cavity is moderately to severely dilated/severe reduction of right ventricular systolic function/concentric hypertrophy.     Diabetes insulin-dependent.  Patient go back to insulin from nursing home. Hemoglobin A1c 8.1.     Parkinsonism.  Patient continue Sinemet.     Chronic renal failure stage III.  Kidney function stable.     Depression.  Patient continue Lexapro, and trazodone at night.     Hypertension.  Patient continue Cozaar.     Neuropathy.  Patient continue Neurontin.     Anemia.  Hemoglobin stable.  No sign of acute bleed.     History of CVA in august.  Left-sided paralysis.  Patient to continue Plavix, and Lipitor.     Bilateral heel ulcers/pressure ulcer  "coccyx -patient continue wound care at the nursing home.     Vital signs stable, afebrile.  Plan to discharge patient back to nursing home.  Patient continue physical therapy.  Patient was started on the Eliquis with Plavix.  Patient follow-up with nursing home physician as soon as able.    Condition on Discharge: Stable    Physical Exam on Discharge:  /63 (BP Location: Right arm, Patient Position: Lying)   Pulse 55   Temp 97.9 °F (36.6 °C) (Temporal Artery )   Resp 16   Ht 175.3 cm (69\")   Wt 103 kg (227 lb 6.4 oz)   SpO2 97%   BMI 33.58 kg/m²    Physical Exam  Constitutional: He is oriented to person, place, and time. He appears well-developed and well-nourished.   HENT:   Head: Normocephalic and atraumatic.   Eyes: Pupils are equal, round, and reactive to light. Conjunctivae and EOM are normal.   Neck: Neck supple. No JVD present. No thyromegaly present.   Cardiovascular: Normal rate, regular rhythm, normal heart sounds and intact distal pulses.  Exam reveals no gallop and no friction rub.    No murmur heard.  Pulmonary/Chest: Effort normal and breath sounds normal. No respiratory distress. He has no wheezes. He has no rales. He exhibits no tenderness.   Decrease in breath sound bilateral.   Abdominal: Soft. Bowel sounds are normal. He exhibits no distension. There is no tenderness. There is no rebound and no guarding.   Musculoskeletal: He exhibits edema (trace to +1 edema, left lower extremities). He exhibits no tenderness or deformity.   Left-sided paralysis.   Lymphadenopathy:     He has no cervical adenopathy.   Neurological: He is alert and oriented to person, place, and time. He displays normal reflexes. No cranial nerve deficit. He exhibits abnormal muscle tone. Coordination abnormal.   Left-sided paralysis   Skin: Skin is warm and dry. Capillary refill takes 2 to 3 seconds. No rash noted. No erythema.   Psychiatric: He has a normal mood and affect. His behavior is normal. Judgment and " thought content normal.   Nursing note and vitals reviewed.    Discharge Disposition:  Skilled Nursing Facility (DC - External)    Discharge Medications:     Discharge Medications      New Medications      Instructions Start Date   apixaban 5 MG tablet tablet  Commonly known as:  ELIQUIS   10 mg, Oral, Every 12 Hours Scheduled, 10 mg bid for 7 days. Then 5 mg bid .         Continue These Medications      Instructions Start Date   acetaminophen 325 MG tablet  Commonly known as:  TYLENOL   650 mg, Oral, Every 6 Hours PRN      APIDRA 100 UNIT/ML injection  Generic drug:  insulin glulisine   35 Units, Subcutaneous, Daily With Breakfast      atorvastatin 80 MG tablet  Commonly known as:  LIPITOR   80 mg, Oral, Nightly      carbidopa-levodopa  MG per tablet  Commonly known as:  SINEMET   2 tablets, Oral, 3 Times Daily      clopidogrel 75 MG tablet  Commonly known as:  PLAVIX   75 mg, Oral, Daily      escitalopram 20 MG tablet  Commonly known as:  LEXAPRO   20 mg, Oral, Daily      gabapentin 300 MG capsule  Commonly known as:  NEURONTIN   300 mg, Oral, Nightly      GLUCAGEN HYPOKIT 1 MG injection  Generic drug:  glucagon   1 mg, Intravenous, As Needed, Give if not able to swallow.       GLUTOSE 15 40 % gel  Generic drug:  dextrose   37.5 g, Oral, As Needed, Give if able to swallow.       HUMALOG KWIKPEN 100 UNIT/ML solution pen-injector  Generic drug:  Insulin Lispro   Subcutaneous, As Needed, Sliding Scale: 201-250 = 4 units. 251-300 = 7 units. 301-350 = 10 units.      hydrALAZINE 25 MG tablet  Commonly known as:  APRESOLINE   25 mg, Oral, 2 Times Daily      insulin detemir 100 UNIT/ML injection  Commonly known as:  LEVEMIR   40 Units, Subcutaneous, Nightly      olmesartan 40 MG tablet  Commonly known as:  BENICAR   40 mg, Oral, Daily      pantoprazole 40 MG EC tablet  Commonly known as:  PROTONIX   40 mg, Oral, Daily      polyethylene glycol packet  Commonly known as:  MIRALAX   17 g, Oral, Daily PRN      traZODone  150 MG tablet  Commonly known as:  DESYREL   150 mg, Oral, Nightly      TRIGLIDE 160 MG tablet  Generic drug:  fenofibrate   160 mg, Oral, Nightly      vitamin B-12 250 MCG tablet  Commonly known as:  CYANOCOBALAMIN   250 mcg, Oral, Daily         Stop These Medications    aspirin 81 MG chewable tablet            Discharge Diet:   Diet Instructions     Advance Diet As Tolerated             Activity at Discharge:   Activity Instructions     Activity as Tolerated             Discharge Care Plan/Instructions:     Follow-up Appointments:   Future Appointments  Date Time Provider Department Center   11/7/2018 1:20 PM Aster Russ APRN [facility] None   Follow-up with nursing home physician as soon as able.      Karly Nixon MD  11/04/18  2:01 PM    Time: Greater than 30 minutes        Electronically signed by Karly Nixon MD at 11/4/2018  2:01 PM       Discharge Order     Start     Ordered    11/04/18 1345  Discharge patient  Once     Expected Discharge Date:  11/04/18    Discharge Disposition:  Skilled Nursing Facility (DC - External)    Physician of Record for Attribution - Please select from Treatment Team:  KARLY NIXON [[id number]]    Review needed by CMO to determine Physician of Record:  No       Question Answer Comment   Physician of Record for Attribution - Please select from Treatment Team KARLY NIXON    Review needed by CMO to determine Physician of Record No        11/04/18 1346

## 2018-11-05 DIAGNOSIS — I82.413 ACUTE DEEP VEIN THROMBOSIS (DVT) OF FEMORAL VEIN OF BOTH LOWER EXTREMITIES (HCC): Primary | ICD-10-CM

## 2018-11-05 NOTE — THERAPY DISCHARGE NOTE
Acute Care - Occupational Therapy Discharge Summary  [facility]     Patient Name: Angelika Sapp  : 1940  MRN: [id number]    Today's Date: 2018  Onset of Illness/Injury or Date of Surgery: 18    Date of Referral to OT: 18  Referring Physician: BRENNA Chin      Admit Date: 2018        OT Recommendation and Plan    Visit Dx:    ICD-10-CM ICD-9-CM   1. Impaired mobility and ADLs Z74.09 799.89                     OT Rehab Goals     Row Name 18 0700             Transfer Goal 1 (OT)    Activity/Assistive Device (Transfer Goal 1, OT) bed-to-chair/chair-to-bed;toilet;shower chair;wheelchair transfer  -CS      [place] Level/Cues Needed (Transfer Goal 1, OT) moderate assist (50-74% patient effort)  -CS      Time Frame (Transfer Goal 1, OT) long term goal (LTG);by discharge  -CS      Progress/Outcome (Transfer Goal 1, OT) goal not met  -CS         Dressing Goal 1 (OT)    Activity/Assistive Device (Dressing Goal 1, OT) upper body dressing  -CS      [place]/Cues Needed (Dressing Goal 1, OT) moderate assist (50-74% patient effort)  -CS      Time Frame (Dressing Goal 1, OT) long term goal (LTG);by discharge  -CS      Progress/Outcome (Dressing Goal 1, OT) goal not met  -CS         Self-Feeding Goal 1 (OT)    Activity/Assistive Device (Self-Feeding Goal 1, OT) self-feeding skills, all  -CS      [place] Level/Cues Needed (Self-Feeding Goal 1, OT) minimum assist (75% or more patient effort)  -CS      Time Frame (Self-Feeding Goal 1, OT) long term goal (LTG);by discharge  -CS      Progress/Outcomes (Self-Feeding Goal 1, OT) goal not met  -CS        User Key  (r) = Recorded By, (t) = Taken By, (c) = Cosigned By    Initials Name Provider Type Discipline    CS Paty Welch, OTR/L Occupational Therapist OT                Outcome Measures     Row Name 18 1500             How much help from another is currently needed...    Putting on and taking off regular lower body clothing?  2  -MW      Bathing (including washing, rinsing, and drying) 2  -MW      Toileting (which includes using toilet bed pan or urinal) 2  -MW      Putting on and taking off regular upper body clothing 2  -MW      Taking care of personal grooming (such as brushing teeth) 2  -MW      Eating meals 2  -MW      Score 12  -MW         Functional Assessment    Outcome Measure Options AM-PAC 6 Clicks Daily Activity (OT)  -MW        User Key  (r) = Recorded By, (t) = Taken By, (c) = Cosigned By    Initials Name Provider Type    Rhina Chin, OTR/L Occupational Therapist          Therapy Suggested Charges     Code   Minutes Charges    None                 OT Discharge Summary  Anticipated Discharge Disposition (OT): skilled nursing facility  Reason for Discharge: Discharge from facility  Outcomes Achieved: Discharge from facility occurred on same date as evluation  Discharge Destination: SNF      Paty Welch OTR/L  11/5/2018

## 2018-11-07 ENCOUNTER — APPOINTMENT (OUTPATIENT)
Dept: GENERAL RADIOLOGY | Facility: HOSPITAL | Age: 78
End: 2018-11-07

## 2018-11-07 ENCOUNTER — HOSPITAL ENCOUNTER (EMERGENCY)
Facility: HOSPITAL | Age: 78
Discharge: HOME | End: 2018-11-07
Attending: EMERGENCY MEDICINE | Admitting: EMERGENCY MEDICINE

## 2018-11-07 ENCOUNTER — OFFICE VISIT (OUTPATIENT)
Dept: NEUROLOGY | Facility: CLINIC | Age: 78
End: 2018-11-07

## 2018-11-07 VITALS
BODY MASS INDEX: 30.88 KG/M2 | WEIGHT: 228 LBS | TEMPERATURE: 98 F | SYSTOLIC BLOOD PRESSURE: 122 MMHG | DIASTOLIC BLOOD PRESSURE: 61 MMHG | RESPIRATION RATE: 18 BRPM | OXYGEN SATURATION: 99 % | HEIGHT: 72 IN | HEART RATE: 60 BPM

## 2018-11-07 VITALS
WEIGHT: 223 LBS | HEART RATE: 54 BPM | DIASTOLIC BLOOD PRESSURE: 60 MMHG | SYSTOLIC BLOOD PRESSURE: 100 MMHG | BODY MASS INDEX: 33.03 KG/M2 | HEIGHT: 69 IN

## 2018-11-07 DIAGNOSIS — I82.413 ACUTE DEEP VEIN THROMBOSIS (DVT) OF FEMORAL VEIN OF BOTH LOWER EXTREMITIES (HCC): ICD-10-CM

## 2018-11-07 DIAGNOSIS — Z79.4 TYPE 2 DIABETES MELLITUS WITH HYPERGLYCEMIA, WITH LONG-TERM CURRENT USE OF INSULIN (HCC): ICD-10-CM

## 2018-11-07 DIAGNOSIS — R06.02 SHORTNESS OF BREATH: ICD-10-CM

## 2018-11-07 DIAGNOSIS — I26.99 BILATERAL PULMONARY EMBOLISM (HCC): ICD-10-CM

## 2018-11-07 DIAGNOSIS — E53.8 B12 DEFICIENCY: ICD-10-CM

## 2018-11-07 DIAGNOSIS — R06.00 DYSPNEA, UNSPECIFIED TYPE: Primary | ICD-10-CM

## 2018-11-07 DIAGNOSIS — I63.311 CEREBROVASCULAR ACCIDENT (CVA) DUE TO THROMBOSIS OF RIGHT MIDDLE CEREBRAL ARTERY (HCC): Primary | ICD-10-CM

## 2018-11-07 DIAGNOSIS — R53.1 WEAKNESS: ICD-10-CM

## 2018-11-07 DIAGNOSIS — E11.65 TYPE 2 DIABETES MELLITUS WITH HYPERGLYCEMIA, WITH LONG-TERM CURRENT USE OF INSULIN (HCC): ICD-10-CM

## 2018-11-07 DIAGNOSIS — G20 PARKINSONISM, UNSPECIFIED PARKINSONISM TYPE (HCC): ICD-10-CM

## 2018-11-07 LAB
ALBUMIN SERPL-MCNC: 3.7 G/DL (ref 3.5–5)
ALBUMIN/GLOB SERPL: 1.3 G/DL (ref 1.1–2.5)
ALP SERPL-CCNC: 58 U/L (ref 24–120)
ALT SERPL W P-5'-P-CCNC: 29 U/L (ref 0–54)
ANION GAP SERPL CALCULATED.3IONS-SCNC: 12 MMOL/L (ref 4–13)
APTT PPP: 36.2 SECONDS (ref 24.1–34.8)
AST SERPL-CCNC: 45 U/L (ref 7–45)
BASOPHILS # BLD AUTO: 0.04 10*3/MM3 (ref 0–0.2)
BASOPHILS NFR BLD AUTO: 0.5 % (ref 0–2)
BILIRUB SERPL-MCNC: 0.4 MG/DL (ref 0.1–1)
BUN BLD-MCNC: 23 MG/DL (ref 5–21)
BUN/CREAT SERPL: 17.4 (ref 7–25)
CALCIUM SPEC-SCNC: 9 MG/DL (ref 8.4–10.4)
CHLORIDE SERPL-SCNC: 102 MMOL/L (ref 98–110)
CO2 SERPL-SCNC: 22 MMOL/L (ref 24–31)
CREAT BLD-MCNC: 1.32 MG/DL (ref 0.5–1.4)
DEPRECATED RDW RBC AUTO: 48.8 FL (ref 40–54)
EOSINOPHIL # BLD AUTO: 0.25 10*3/MM3 (ref 0–0.7)
EOSINOPHIL NFR BLD AUTO: 3 % (ref 0–4)
ERYTHROCYTE [DISTWIDTH] IN BLOOD BY AUTOMATED COUNT: 14 % (ref 12–15)
GFR SERPL CREATININE-BSD FRML MDRD: 52 ML/MIN/1.73
GLOBULIN UR ELPH-MCNC: 2.9 GM/DL
GLUCOSE BLD-MCNC: 171 MG/DL (ref 70–100)
HCT VFR BLD AUTO: 36 % (ref 40–52)
HGB BLD-MCNC: 12 G/DL (ref 14–18)
HOLD SPECIMEN: NORMAL
HOLD SPECIMEN: NORMAL
IMM GRANULOCYTES # BLD: 0.07 10*3/MM3 (ref 0–0.03)
IMM GRANULOCYTES NFR BLD: 0.8 % (ref 0–5)
INR PPP: 1.39 (ref 0.91–1.09)
LYMPHOCYTES # BLD AUTO: 1.47 10*3/MM3 (ref 0.72–4.86)
LYMPHOCYTES NFR BLD AUTO: 17.7 % (ref 15–45)
MCH RBC QN AUTO: 31.7 PG (ref 28–32)
MCHC RBC AUTO-ENTMCNC: 33.3 G/DL (ref 33–36)
MCV RBC AUTO: 95 FL (ref 82–95)
MONOCYTES # BLD AUTO: 0.73 10*3/MM3 (ref 0.19–1.3)
MONOCYTES NFR BLD AUTO: 8.8 % (ref 4–12)
NEUTROPHILS # BLD AUTO: 5.73 10*3/MM3 (ref 1.87–8.4)
NEUTROPHILS NFR BLD AUTO: 69.2 % (ref 39–78)
NRBC BLD MANUAL-RTO: 0 /100 WBC (ref 0–0)
NT-PROBNP SERPL-MCNC: 272 PG/ML (ref 0–1800)
PLATELET # BLD AUTO: 297 10*3/MM3 (ref 130–400)
PMV BLD AUTO: 9.9 FL (ref 6–12)
POTASSIUM BLD-SCNC: 4.8 MMOL/L (ref 3.5–5.3)
PROT SERPL-MCNC: 6.6 G/DL (ref 6.3–8.7)
PROTHROMBIN TIME: 17.5 SECONDS (ref 11.9–14.6)
RBC # BLD AUTO: 3.79 10*6/MM3 (ref 4.8–5.9)
SODIUM BLD-SCNC: 136 MMOL/L (ref 135–145)
TROPONIN I SERPL-MCNC: 0.02 NG/ML (ref 0–0.03)
WBC NRBC COR # BLD: 8.29 10*3/MM3 (ref 4.8–10.8)
WHOLE BLOOD HOLD SPECIMEN: NORMAL
WHOLE BLOOD HOLD SPECIMEN: NORMAL

## 2018-11-07 PROCEDURE — 99214 OFFICE O/P EST MOD 30 MIN: CPT | Performed by: CLINICAL NURSE SPECIALIST

## 2018-11-07 PROCEDURE — 85610 PROTHROMBIN TIME: CPT | Performed by: EMERGENCY MEDICINE

## 2018-11-07 PROCEDURE — 99284 EMERGENCY DEPT VISIT MOD MDM: CPT

## 2018-11-07 PROCEDURE — 85025 COMPLETE CBC W/AUTO DIFF WBC: CPT | Performed by: EMERGENCY MEDICINE

## 2018-11-07 PROCEDURE — 84484 ASSAY OF TROPONIN QUANT: CPT | Performed by: EMERGENCY MEDICINE

## 2018-11-07 PROCEDURE — 93010 ELECTROCARDIOGRAM REPORT: CPT | Performed by: INTERNAL MEDICINE

## 2018-11-07 PROCEDURE — 71045 X-RAY EXAM CHEST 1 VIEW: CPT

## 2018-11-07 PROCEDURE — 80053 COMPREHEN METABOLIC PANEL: CPT | Performed by: EMERGENCY MEDICINE

## 2018-11-07 PROCEDURE — 85730 THROMBOPLASTIN TIME PARTIAL: CPT | Performed by: EMERGENCY MEDICINE

## 2018-11-07 PROCEDURE — 93005 ELECTROCARDIOGRAM TRACING: CPT | Performed by: EMERGENCY MEDICINE

## 2018-11-07 PROCEDURE — 83880 ASSAY OF NATRIURETIC PEPTIDE: CPT | Performed by: EMERGENCY MEDICINE

## 2018-11-07 RX ORDER — SODIUM CHLORIDE 0.9 % (FLUSH) 0.9 %
10 SYRINGE (ML) INJECTION AS NEEDED
Status: DISCONTINUED | OUTPATIENT
Start: 2018-11-07 | End: 2018-11-07 | Stop reason: HOSPADM

## 2018-11-07 NOTE — ED PROVIDER NOTES
Subjective   Patient brought by family with c/o SOB and weakness.  Was here today for evaluation in neuro and while there had sudden feeling of SOB and weakness.  Family says office thought he looked pale and wanted him to come be checked out.  Patient says he is back to his baseline now and feels fine.  [place] [facility] [place] and finding of PE and clots in legs.        History provided by:  Patient and relative   used: No    Shortness of Breath   Severity:  Moderate  Onset quality:  Sudden  Duration:  1 hour  Timing:  Constant  Progression:  Resolved  Chronicity:  Recurrent  Context: not activity, not animal exposure, not emotional upset, not fumes, not known allergens, not occupational exposure, not pollens, not smoke exposure, not strong odors, not URI and not weather changes    Relieved by:  Nothing  Worsened by:  Nothing  Ineffective treatments:  None tried  Associated symptoms: no abdominal pain, no chest pain, no cough, no diaphoresis, no ear pain, no fever, no hemoptysis, no neck pain, no sore throat, no sputum production, no vomiting and no wheezing    Risk factors: no recent alcohol use, no family hx of DVT, no hx of cancer, no hx of PE/DVT, no obesity, no prolonged immobilization and no recent surgery        Review of Systems   Constitutional: Negative for diaphoresis and fever.   HENT: Negative.  Negative for ear pain and sore throat.    Respiratory: Positive for shortness of breath. Negative for cough, hemoptysis, sputum production and wheezing.    Cardiovascular: Negative.  Negative for chest pain.   Gastrointestinal: Negative.  Negative for abdominal pain and vomiting.   Genitourinary: Negative.    Musculoskeletal: Negative.  Negative for neck pain.   Neurological: Positive for weakness.   Psychiatric/Behavioral: Negative.    All other systems reviewed and are negative.      Past Medical History:   Diagnosis Date   • Arthritis    • Chronic  kidney disease (CKD), stage III (moderate) (CMS/[facility])    • CVA (cerebral vascular accident) (CMS/[facility])     Right lacunar infarction with extension into right paraventricular hemisphere   • Depressive disorder    • GERD (gastroesophageal reflux disease)    • Hemiplegia (CMS/[facility])    • Hyperlipidemia    • Hypertension    • Insulin dependent diabetes mellitus (CMS/HCC)    • Parkinson's disease (CMS/[facility])        No Known Allergies    History reviewed. No pertinent surgical history.    Family History   Problem Relation Age of Onset   • Alzheimer's disease Mother    • Alcohol abuse Father        Social History     Social History   • Marital status:      Social History Main Topics   • Smoking status: Never Smoker   • Smokeless tobacco: Former User     Types: Chew   • Alcohol use No   • Drug use: No     Other Topics Concern   • Not on file       Prior to Admission medications    Medication Sig Start Date End Date Taking? Authorizing Provider   acetaminophen (TYLENOL) 325 MG tablet Take 650 mg by mouth Every 6 (Six) Hours As Needed for Mild Pain .    Denise Smith MD   apixaban (ELIQUIS) 5 MG tablet tablet Take 2 tablets by mouth Every 12 (Twelve) Hours. 10 mg bid for 7 days. Then 5 mg bid . 11/4/18   Lito Odom MD   atorvastatin (LIPITOR) 80 MG tablet Take 1 tablet by mouth Every Night. 8/14/18   Mick Francisco APRN   carbidopa-levodopa (SINEMET)  MG per tablet Take 2 tablets by mouth 3 (Three) Times a Day.    Denise Smith MD   clopidogrel (PLAVIX) 75 MG tablet Take 75 mg by mouth Daily.    Denise Smith MD   Cyanocobalamin 1000 MCG/ML kit Inject 1,000 mcg into the appropriate muscle as directed by prescriber Daily.    Denise Smith MD   dextrose (GLUTOSE 15) 40 % gel Take 37.5 g by mouth As Needed for Low Blood Sugar (BG <60). Give if able to swallow.    Denise Smith MD   escitalopram (LEXAPRO) 20 MG tablet Take 20 mg by mouth Daily.    Denise Smith MD    fenofibrate (TRIGLIDE) 160 MG tablet Take 160 mg by mouth Every Night.    Denise Smith MD   glucagon (GLUCAGEN HYPOKIT) 1 MG injection Infuse 1 mg into a venous catheter As Needed for Low Blood Sugar (BG <60). Give if not able to swallow.    Denise Smith MD   hydrALAZINE (APRESOLINE) 25 MG tablet Take 25 mg by mouth 2 (Two) Times a Day.    Denise Smith MD   insulin detemir (LEVEMIR) 100 UNIT/ML injection Inject 40 Units under the skin into the appropriate area as directed Every Night.    Denise Smith MD   insulin glulisine (APIDRA) 100 UNIT/ML injection Inject 35 Units under the skin into the appropriate area as directed Daily With Breakfast.    Denise Smith MD   Insulin Lispro (HUMALOG KWIKPEN) 100 UNIT/ML solution pen-injector Inject  under the skin into the appropriate area as directed As Needed. Sliding Scale:  201-250 = 4 units.  251-300 = 7 units.  301-350 = 10 units.     Denise Smith MD   olmesartan (BENICAR) 40 MG tablet Take 40 mg by mouth Daily.    Denise Smith MD   pantoprazole (PROTONIX) 40 MG EC tablet Take 40 mg by mouth Daily.    Denise Smith MD   traZODone (DESYREL) 150 MG tablet Take 150 mg by mouth Every Night.    Denise Smith MD   vitamin B-12 (CYANOCOBALAMIN) 250 MCG tablet Take 1 tablet by mouth Daily. 8/19/18   Mick Francisco APRN   gabapentin (NEURONTIN) 300 MG capsule Take 300 mg by mouth Every Night.  11/7/18  Denise Smith MD   polyethylene glycol (MIRALAX) packet Take 17 g by mouth Daily As Needed (constipation).  11/7/18  Denise Smith MD       Medications   sodium chloride 0.9 % flush 10 mL (not administered)       Vitals:    11/07/18 1451   BP:    Pulse:    Resp:    Temp: 97.5 °F (36.4 °C)   SpO2:          Objective   Physical Exam   Constitutional: He is oriented to person, place, and time. He appears well-developed and well-nourished.   HENT:   Head: Normocephalic and atraumatic.    Eyes: Pupils are equal, round, and reactive to light. EOM are normal.   Neck: Normal range of motion. Neck supple.   Cardiovascular: Normal rate and regular rhythm.    Pulmonary/Chest: Effort normal and breath sounds normal.   Abdominal: Soft. Bowel sounds are normal.   Musculoskeletal: Normal range of motion.   Neurological: He is alert and oriented to person, place, and time.   Diffusely weak but nonfocal.   Psychiatric: He has a normal mood and affect. His behavior is normal.   Nursing note and vitals reviewed.      Procedures         Lab Results (last 24 hours)     Procedure Component Value Units Date/Time    CBC & Differential [[id number]] Collected:  11/07/18 1517    Specimen:  Blood Updated:  11/07/18 1527    Narrative:       The following orders were created for panel order CBC & Differential.  Procedure                               Abnormality         Status                     ---------                               -----------         ------                     CBC Auto Differential[[id number]]        Abnormal            Final result                 Please view results for these tests on the individual orders.    Comprehensive Metabolic Panel [[id number]]  (Abnormal) Collected:  11/07/18 1517    Specimen:  Blood Updated:  11/07/18 1537     Glucose 171 (H) mg/dL      BUN 23 (H) mg/dL      Creatinine 1.32 mg/dL      Sodium 136 mmol/L      Potassium 4.8 mmol/L      Chloride 102 mmol/L      CO2 22.0 (L) mmol/L      Calcium 9.0 mg/dL      Total Protein 6.6 g/dL      Albumin 3.70 g/dL      ALT (SGPT) 29 U/L      AST (SGOT) 45 U/L      Alkaline Phosphatase 58 U/L      Total Bilirubin 0.4 mg/dL      eGFR Non African Amer 52 (L) mL/min/1.73      Globulin 2.9 gm/dL      A/G Ratio 1.3 g/dL      BUN/Creatinine Ratio 17.4     Anion Gap 12.0 mmol/L     Narrative:       The MDRD GFR formula is only valid for adults with stable renal function between ages 18 and 70.    Protime-INR [[id number]]  (Abnormal) Collected:   11/07/18 1517    Specimen:  Blood Updated:  11/07/18 1535     Protime 17.5 (H) Seconds      INR 1.39 (H)    aPTT [[id number]]  (Abnormal) Collected:  11/07/18 1517    Specimen:  Blood Updated:  11/07/18 1535     PTT 36.2 (H) seconds     BNP [[id number]]  (Normal) Collected:  11/07/18 1517    Specimen:  Blood Updated:  11/07/18 1549     proBNP 272.0 pg/mL     Troponin [[id number]]  (Normal) Collected:  11/07/18 1517    Specimen:  Blood Updated:  11/07/18 1549     Troponin I 0.018 ng/mL     CBC Auto Differential [[id number]]  (Abnormal) Collected:  11/07/18 1517    Specimen:  Blood Updated:  11/07/18 1527     WBC 8.29 10*3/mm3      RBC 3.79 (L) 10*6/mm3      Hemoglobin 12.0 (L) g/dL      Hematocrit 36.0 (L) %      MCV 95.0 fL      MCH 31.7 pg      MCHC 33.3 g/dL      RDW 14.0 %      RDW-SD 48.8 fl      MPV 9.9 fL      Platelets 297 10*3/mm3      Neutrophil % 69.2 %      Lymphocyte % 17.7 %      Monocyte % 8.8 %      Eosinophil % 3.0 %      Basophil % 0.5 %      Immature Grans % 0.8 %      Neutrophils, Absolute 5.73 10*3/mm3      Lymphocytes, Absolute 1.47 10*3/mm3      Monocytes, Absolute 0.73 10*3/mm3      Eosinophils, Absolute 0.25 10*3/mm3      Basophils, Absolute 0.04 10*3/mm3      Immature Grans, Absolute 0.07 (H) 10*3/mm3      nRBC 0.0 /100 WBC           XR Chest 1 View   Final Result   1. Stable chest exam without acute process.           This report was finalized on 11/07/2018 15:26 by Dr Freddy Lewis, .          ED Course  ED Course as of Nov 07 1554   Wed Nov 07, 2018   1553 Told patient and family of test results.  Things are basically at baseline now and stable for DC.  [TR]      ED Course User Index  [TR] Herman Cisneros Jr., MD          MDM  Number of Diagnoses or Management Options  Dyspnea, unspecified type: new and requires workup  Weakness: new and requires workup     Amount and/or Complexity of Data Reviewed  Clinical lab tests: ordered and reviewed  Tests in the radiology section of CPT®: ordered  and reviewed  Tests in the medicine section of CPT®: ordered and reviewed    Risk of Complications, Morbidity, and/or Mortality  Presenting problems: moderate  Diagnostic procedures: moderate  Management options: moderate    Patient Progress  Patient progress: stable      Final diagnoses:   Dyspnea, unspecified type   Weakness          Herman Cisneros Jr., MD  11/07/18 1554

## 2018-11-07 NOTE — PROGRESS NOTES
Subjective     Chief Complaint   Patient presents with   • Stroke     left sided weakness and numbness   • Parkinson's Disease     started sinemet 2016       Angelika Sapp is a 78 y.o. male right handed retiree. He is accompanied by his wife. He is in a wheel chair. He is here today for hospital follow up for [facility] stroke. He is currently living at [facility] in [place]. Patient has a complex history. He has hx of prior stroke in 2016 that effected his left side and had been seeing Dr. Rogel. He last saw Dr. Rogel 6/2017 and at that time patient was complaining of tremors in right hand and impaired gait. I have reviewed that note and Dr. Rogel did discuss work up for possible NPH but patient declined. Patient was showing some signs of PD but also thought symptoms may be related to previous stroke. . At any rate, patient was started on sinemet 25/100 TID and patient did not return. At some point sinemet 25/100 was increased to 2 tablet BID. Patient and wife are both poor historians and uncertain if sinemet has been beneficial over the last 2 years.  Prior to stoke 8/2018 patient was ambulating with a cane. Patient also found to have B12 deficiency and is now taking oral replacement.  In August 2018, patient presented to [facility] with worsening left sided weakness. He was discharged to [facility]  On ASA and Plavix and and night prior to presentation patinet was having difficulty chewing and food falling from left side of mouth. Patient states about midnight had more left sided weakness and when wife returned the next day there was significant increase in weakness on left and patient was brought to [facility]. He was seen by chris Sood. He was found to have ischemic stroke right . He was later discharged to [facility]. On 11/1/18 while doing therapy patient had a syncopal episode and had hypoxia. He was broght back to [facility] and found to have bilateral DVT and PE. He was started on Eliquis BID. Plavix remains and ASA  was discontinued. Patient now presents for follow up. He is SOB at times and pale. Oxygen saturation was 91%.     Stroke   This is a chronic (right hemishere and RBG) problem. Episode onset: 2016 and 8/2018. The problem occurs daily. The problem has been resolved. Associated symptoms include fatigue and weakness. Pertinent negatives include no arthralgias, chest pain, coughing, headaches, myalgias, nausea, numbness or vomiting. Associated symptoms comments: Left sided weakness/paralysis.. Exacerbated by: DM, HTN, dyslipidemia, DVT, PE. Treatments tried: eliquis, plavix, statin. The treatment provided no relief.   Parkinson's Disease   This is a chronic problem. Episode onset: 2016. The problem occurs daily. Associated symptoms include fatigue and weakness. Pertinent negatives include no arthralgias, chest pain, coughing, headaches, myalgias, nausea, numbness or vomiting. Associated symptoms comments: Tremor right hand, impaired gait. Exacerbated by: stroke. Treatments tried: sinemet.        Current Outpatient Prescriptions   Medication Sig Dispense Refill   • acetaminophen (TYLENOL) 325 MG tablet Take 650 mg by mouth Every 6 (Six) Hours As Needed for Mild Pain .     • apixaban (ELIQUIS) 5 MG tablet tablet Take 2 tablets by mouth Every 12 (Twelve) Hours. 10 mg bid for 7 days. Then 5 mg bid . 60 tablet    • atorvastatin (LIPITOR) 80 MG tablet Take 1 tablet by mouth Every Night.     • carbidopa-levodopa (SINEMET)  MG per tablet Take 2 tablets by mouth 3 (Three) Times a Day.     • clopidogrel (PLAVIX) 75 MG tablet Take 75 mg by mouth Daily.     • Cyanocobalamin 1000 MCG/ML kit Inject 1,000 mcg into the appropriate muscle as directed by prescriber Daily.     • dextrose (GLUTOSE 15) 40 % gel Take 37.5 g by mouth As Needed for Low Blood Sugar (BG <60). Give if able to swallow.     • escitalopram (LEXAPRO) 20 MG tablet Take 20 mg by mouth Daily.     • fenofibrate (TRIGLIDE) 160 MG tablet Take 160 mg by mouth Every  Night.     • glucagon (GLUCAGEN HYPOKIT) 1 MG injection Infuse 1 mg into a venous catheter As Needed for Low Blood Sugar (BG <60). Give if not able to swallow.     • hydrALAZINE (APRESOLINE) 25 MG tablet Take 25 mg by mouth 2 (Two) Times a Day.     • insulin detemir (LEVEMIR) 100 UNIT/ML injection Inject 40 Units under the skin into the appropriate area as directed Every Night.     • insulin glulisine (APIDRA) 100 UNIT/ML injection Inject 35 Units under the skin into the appropriate area as directed Daily With Breakfast.     • Insulin Lispro (HUMALOG KWIKPEN) 100 UNIT/ML solution pen-injector Inject  under the skin into the appropriate area as directed As Needed. Sliding Scale:  201-250 = 4 units.  251-300 = 7 units.  301-350 = 10 units.      • olmesartan (BENICAR) 40 MG tablet Take 40 mg by mouth Daily.     • pantoprazole (PROTONIX) 40 MG EC tablet Take 40 mg by mouth Daily.     • traZODone (DESYREL) 150 MG tablet Take 150 mg by mouth Every Night.     • vitamin B-12 (CYANOCOBALAMIN) 250 MCG tablet Take 1 tablet by mouth Daily. 30 tablet      No current facility-administered medications for this visit.      Facility-Administered Medications Ordered in Other Visits   Medication Dose Route Frequency Provider Last Rate Last Dose   • sodium chloride 0.9 % flush 10 mL  10 mL Intravenous PRN Herman Cisneros Jr., MD           Past Medical History:   Diagnosis Date   • Arthritis    • Chronic kidney disease (CKD), stage III (moderate) (CMS/HCC)    • CVA (cerebral vascular accident) (CMS/HCC)     Right lacunar infarction with extension into right paraventricular hemisphere   • Depressive disorder    • GERD (gastroesophageal reflux disease)    • Hemiplegia (CMS/HCC)    • Hyperlipidemia    • Hypertension    • Insulin dependent diabetes mellitus (CMS/HCC)    • Parkinson's disease (CMS/HCC)        No past surgical history on file.    family history includes Alcohol abuse in his father; Alzheimer's disease in his  "mother.    Social History   Substance Use Topics   • Smoking status: Never Smoker   • Smokeless tobacco: Former User     Types: Chew   • Alcohol use No       Review of Systems   Constitutional: Positive for fatigue.   HENT: Negative.  Negative for hearing loss, tinnitus and trouble swallowing.    Eyes: Negative.  Negative for visual disturbance.   Respiratory: Positive for shortness of breath. Negative for apnea and cough.    Cardiovascular: Negative.  Negative for chest pain. Leg swelling: left leg.   Gastrointestinal: Negative.  Negative for blood in stool, constipation, diarrhea, nausea and vomiting.   Endocrine: Negative.    Genitourinary: Positive for dysuria.   Musculoskeletal: Positive for gait problem (paralysis left side). Negative for arthralgias and myalgias.   Skin: Negative.    Allergic/Immunologic: Negative.    Neurological: Positive for tremors (right hand) and weakness. Negative for dizziness, numbness and headaches.   Hematological: Negative.    Psychiatric/Behavioral: Negative.  Negative for agitation, confusion and hallucinations.   All other systems reviewed and are negative.      Objective     /60   Pulse 54   Ht 175.3 cm (69\")   Wt 101 kg (223 lb)   BMI 32.93 kg/m² , Body mass index is 32.93 kg/m².    Physical Exam   Constitutional: He is oriented to person, place, and time. Vital signs are normal. He appears well-developed and well-nourished. He is cooperative.   HENT:   Head: Normocephalic and atraumatic.   Right Ear: Hearing and external ear normal.   Left Ear: Hearing and external ear normal.   Nose: Nose normal.   Mouth/Throat: Oropharynx is clear and moist.   Eyes: Pupils are equal, round, and reactive to light. Conjunctivae, EOM and lids are normal. Right eye exhibits normal extraocular motion and no nystagmus. Left eye exhibits normal extraocular motion and no nystagmus. Right pupil is round and reactive. Left pupil is round and reactive. Pupils are equal.   Neck: Normal range " of motion. Neck supple. Carotid bruit is not present.   Cardiovascular: Normal rate, regular rhythm and normal heart sounds.    No murmur heard.  Pulses:       Dorsalis pedis pulses are 1+ on the right side, and 1+ on the left side.        Posterior tibial pulses are 1+ on the right side, and 1+ on the left side.   Pulmonary/Chest: Effort normal. He has no decreased breath sounds. He has rhonchi in the right upper field, the right middle field and the right lower field.   Abdominal: Soft. Bowel sounds are normal.   Musculoskeletal: Normal range of motion.     Vascular Status -  His right foot exhibits no edema. His left foot exhibits abnormal foot edema.  Neurological: He is alert and oriented to person, place, and time. He has normal strength and normal reflexes. He displays tremor (resting intermittent tremor right hand). No cranial nerve deficit or sensory deficit. He exhibits abnormal muscle tone (increase tone and cogwheeling right arm, spasticity left arm ). He displays a negative Romberg sign. Gait (did not attemtp to stand for safety reasons) abnormal. Coordination (no ataxia right FTN, HTS. not able to complete on left) normal.   Reflex Scores:       Tricep reflexes are 2+ on the right side and 2+ on the left side.       Bicep reflexes are 2+ on the right side and 2+ on the left side.       Brachioradialis reflexes are 2+ on the right side and 2+ on the left side.       Patellar reflexes are 2+ on the right side and 2+ on the left side.       Achilles reflexes are 2+ on the right side and 2+ on the left side.  Awake, alert. No aphasia, no dysarthria  Completes simple and complex commands    CN II:  Visual fields full.  Pupils equally reactive to light  CN III, IV, VI:  Extraocular Muscles full with no signs of nystagmus  CN V:  Facial sensory is symmetric with no asymetries.  CN VII:  Facial motor symmetric  CN VIII:  Gross hearing intact bilaterally  CN IX:  Palate elevates symmetrically  CN X:  Palate  elevates symmetrically  CN XI:  Shoulder shrug asymmetric, decrease on left  CN XII:  Tongue is midline on protrusion    RUE/RLE strength 5/5 proximal and distal  Patient not able to hold LUE against gravity  Patient cannot raise left leg but does have weak extension 2/5    Skin: Skin is warm and dry.   Psychiatric: He has a normal mood and affect. His speech is normal and behavior is normal. Cognition and memory are normal.   Nursing note and vitals reviewed.      Results for orders placed or performed during the hospital encounter of 11/01/18   Troponin   Result Value Ref Range    Troponin I 0.608 (C) 0.000 - 0.034 ng/mL   Troponin   Result Value Ref Range    Troponin I 0.653 (C) 0.000 - 0.034 ng/mL   Protime-INR   Result Value Ref Range    Protime 13.5 11.9 - 14.6 Seconds    INR 1.00 0.91 - 1.09   CBC Auto Differential   Result Value Ref Range    WBC 6.32 4.80 - 10.80 10*3/mm3    RBC 3.37 (L) 4.80 - 5.90 10*6/mm3    Hemoglobin 10.6 (L) 14.0 - 18.0 g/dL    Hematocrit 32.4 (L) 40.0 - 52.0 %    MCV 96.1 (H) 82.0 - 95.0 fL    MCH 31.5 28.0 - 32.0 pg    MCHC 32.7 (L) 33.0 - 36.0 g/dL    RDW 14.0 12.0 - 15.0 %    RDW-SD 49.1 40.0 - 54.0 fl    MPV 9.5 6.0 - 12.0 fL    Platelets 255 130 - 400 10*3/mm3    Neutrophil % 55.0 39.0 - 78.0 %    Lymphocyte % 29.6 15.0 - 45.0 %    Monocyte % 10.1 4.0 - 12.0 %    Eosinophil % 4.3 (H) 0.0 - 4.0 %    Basophil % 0.5 0.0 - 2.0 %    Immature Grans % 0.5 0.0 - 5.0 %    Neutrophils, Absolute 3.48 1.87 - 8.40 10*3/mm3    Lymphocytes, Absolute 1.87 0.72 - 4.86 10*3/mm3    Monocytes, Absolute 0.64 0.19 - 1.30 10*3/mm3    Eosinophils, Absolute 0.27 0.00 - 0.70 10*3/mm3    Basophils, Absolute 0.03 0.00 - 0.20 10*3/mm3    Immature Grans, Absolute 0.03 0.00 - 0.03 10*3/mm3    nRBC 0.0 0.0 - 0.0 /100 WBC   Comprehensive Metabolic Panel   Result Value Ref Range    Glucose 218 (H) 70 - 100 mg/dL    BUN 27 (H) 5 - 21 mg/dL    Creatinine 1.35 0.50 - 1.40 mg/dL    Sodium 137 135 - 145 mmol/L     Potassium 4.3 3.5 - 5.3 mmol/L    Chloride 107 98 - 110 mmol/L    CO2 22.0 (L) 24.0 - 31.0 mmol/L    Calcium 8.2 (L) 8.4 - 10.4 mg/dL    Total Protein 5.4 (L) 6.3 - 8.7 g/dL    Albumin 2.90 (L) 3.50 - 5.00 g/dL    ALT (SGPT) <15 0 - 54 U/L    AST (SGOT) 24 7 - 45 U/L    Alkaline Phosphatase 47 24 - 120 U/L    Total Bilirubin 0.3 0.1 - 1.0 mg/dL    eGFR Non African Amer 51 (L) >60 mL/min/1.73    Globulin 2.5 gm/dL    A/G Ratio 1.2 1.1 - 2.5 g/dL    BUN/Creatinine Ratio 20.0 7.0 - 25.0    Anion Gap 8.0 4.0 - 13.0 mmol/L   Lipid Panel   Result Value Ref Range    Total Cholesterol 112 (L) 130 - 200 mg/dL    Triglycerides 218 (H) 0 - 149 mg/dL    HDL Cholesterol 22 (L) >=40 mg/dL    LDL Cholesterol  81 0 - 99 mg/dL    LDL/HDL Ratio 2.11    Hemoglobin A1c   Result Value Ref Range    Hemoglobin A1C 8.1 %   TSH   Result Value Ref Range    TSH 3.320 0.470 - 4.680 mIU/mL   Comprehensive Metabolic Panel   Result Value Ref Range    Glucose 75 70 - 100 mg/dL    BUN 24 (H) 5 - 21 mg/dL    Creatinine 1.28 0.50 - 1.40 mg/dL    Sodium 140 135 - 145 mmol/L    Potassium 4.2 3.5 - 5.3 mmol/L    Chloride 108 98 - 110 mmol/L    CO2 25.0 24.0 - 31.0 mmol/L    Calcium 8.5 8.4 - 10.4 mg/dL    Total Protein 5.7 (L) 6.3 - 8.7 g/dL    Albumin 3.00 (L) 3.50 - 5.00 g/dL    ALT (SGPT) <15 0 - 54 U/L    AST (SGOT) 20 7 - 45 U/L    Alkaline Phosphatase 40 24 - 120 U/L    Total Bilirubin 0.4 0.1 - 1.0 mg/dL    eGFR Non African Amer 54 (L) >60 mL/min/1.73    Globulin 2.7 gm/dL    A/G Ratio 1.1 1.1 - 2.5 g/dL    BUN/Creatinine Ratio 18.8 7.0 - 25.0    Anion Gap 7.0 4.0 - 13.0 mmol/L   Vitamin B12   Result Value Ref Range    Vitamin B-12 341 239 - 931 pg/mL   Folate   Result Value Ref Range    Folate 5.33 >2.75 ng/mL   Iron Profile   Result Value Ref Range    Iron 58 42 - 180 mcg/dL    TIBC 316 225 - 420 mcg/dL    Iron Saturation 18 (L) 20 - 45 %   Ferritin   Result Value Ref Range    Ferritin 153.00 17.90 - 464.00 ng/mL   Reticulocytes   Result Value  Ref Range    Reticulocyte % 2.15 (H) 0.60 - 1.80 %    Reticulocyte Absolute 0.0727 0.0200 - 0.1300 10*6/mm3   CBC Auto Differential   Result Value Ref Range    WBC 5.76 4.80 - 10.80 10*3/mm3    RBC 3.38 (L) 4.80 - 5.90 10*6/mm3    Hemoglobin 10.8 (L) 14.0 - 18.0 g/dL    Hematocrit 33.2 (L) 40.0 - 52.0 %    MCV 98.2 (H) 82.0 - 95.0 fL    MCH 32.0 28.0 - 32.0 pg    MCHC 32.5 (L) 33.0 - 36.0 g/dL    RDW 14.0 12.0 - 15.0 %    RDW-SD 50.4 40.0 - 54.0 fl    MPV 10.0 6.0 - 12.0 fL    Platelets 237 130 - 400 10*3/mm3    Neutrophil % 54.8 39.0 - 78.0 %    Lymphocyte % 27.6 15.0 - 45.0 %    Monocyte % 10.6 4.0 - 12.0 %    Eosinophil % 5.4 (H) 0.0 - 4.0 %    Basophil % 0.9 0.0 - 2.0 %    Immature Grans % 0.7 0.0 - 5.0 %    Neutrophils, Absolute 3.16 1.87 - 8.40 10*3/mm3    Lymphocytes, Absolute 1.59 0.72 - 4.86 10*3/mm3    Monocytes, Absolute 0.61 0.19 - 1.30 10*3/mm3    Eosinophils, Absolute 0.31 0.00 - 0.70 10*3/mm3    Basophils, Absolute 0.05 0.00 - 0.20 10*3/mm3    Immature Grans, Absolute 0.04 (H) 0.00 - 0.03 10*3/mm3    nRBC 0.0 0.0 - 0.0 /100 WBC   Comprehensive Metabolic Panel   Result Value Ref Range    Glucose 114 (H) 70 - 100 mg/dL    BUN 19 5 - 21 mg/dL    Creatinine 1.24 0.50 - 1.40 mg/dL    Sodium 139 135 - 145 mmol/L    Potassium 4.5 3.5 - 5.3 mmol/L    Chloride 105 98 - 110 mmol/L    CO2 24.0 24.0 - 31.0 mmol/L    Calcium 8.9 8.4 - 10.4 mg/dL    Total Protein 5.3 (L) 6.3 - 8.7 g/dL    Albumin 2.90 (L) 3.50 - 5.00 g/dL    ALT (SGPT) <15 0 - 54 U/L    AST (SGOT) 18 7 - 45 U/L    Alkaline Phosphatase 44 24 - 120 U/L    Total Bilirubin 0.5 0.1 - 1.0 mg/dL    eGFR Non African Amer 56 (L) >60 mL/min/1.73    Globulin 2.4 gm/dL    A/G Ratio 1.2 1.1 - 2.5 g/dL    BUN/Creatinine Ratio 15.3 7.0 - 25.0    Anion Gap 10.0 4.0 - 13.0 mmol/L   CBC Auto Differential   Result Value Ref Range    WBC 5.01 4.80 - 10.80 10*3/mm3    RBC 3.41 (L) 4.80 - 5.90 10*6/mm3    Hemoglobin 10.8 (L) 14.0 - 18.0 g/dL    Hematocrit 32.7 (L) 40.0  - 52.0 %    MCV 95.9 (H) 82.0 - 95.0 fL    MCH 31.7 28.0 - 32.0 pg    MCHC 33.0 33.0 - 36.0 g/dL    RDW 13.8 12.0 - 15.0 %    RDW-SD 48.9 40.0 - 54.0 fl    MPV 9.8 6.0 - 12.0 fL    Platelets 251 130 - 400 10*3/mm3    Neutrophil % 54.1 39.0 - 78.0 %    Lymphocyte % 27.7 15.0 - 45.0 %    Monocyte % 10.8 4.0 - 12.0 %    Eosinophil % 6.0 (H) 0.0 - 4.0 %    Basophil % 0.8 0.0 - 2.0 %    Immature Grans % 0.6 0.0 - 5.0 %    Neutrophils, Absolute 2.71 1.87 - 8.40 10*3/mm3    Lymphocytes, Absolute 1.39 0.72 - 4.86 10*3/mm3    Monocytes, Absolute 0.54 0.19 - 1.30 10*3/mm3    Eosinophils, Absolute 0.30 0.00 - 0.70 10*3/mm3    Basophils, Absolute 0.04 0.00 - 0.20 10*3/mm3    Immature Grans, Absolute 0.03 0.00 - 0.03 10*3/mm3    nRBC 0.0 0.0 - 0.0 /100 WBC   POC Glucose Once   Result Value Ref Range    Glucose 193 (H) 70 - 130 mg/dL   POC Glucose Once   Result Value Ref Range    Glucose 144 (H) 70 - 130 mg/dL   POC Glucose Once   Result Value Ref Range    Glucose 173 (H) 70 - 130 mg/dL   POC Glucose Once   Result Value Ref Range    Glucose 175 (H) 70 - 130 mg/dL   POC Glucose Once   Result Value Ref Range    Glucose 159 (H) 70 - 130 mg/dL   POC Glucose Once   Result Value Ref Range    Glucose 61 (L) 70 - 130 mg/dL   POC Glucose Once   Result Value Ref Range    Glucose 180 (H) 70 - 130 mg/dL   POC Glucose Once   Result Value Ref Range    Glucose 163 (H) 70 - 130 mg/dL   POC Glucose Once   Result Value Ref Range    Glucose 203 (H) 70 - 130 mg/dL   POC Glucose Once   Result Value Ref Range    Glucose 101 70 - 130 mg/dL   POC Glucose Once   Result Value Ref Range    Glucose 206 (H) 70 - 130 mg/dL   Adult Transthoracic Echo Limited W/ Cont if Necessary Per Protocol   Result Value Ref Range    BSA 2.1 m^2     CV ECHO PRIYANK - RVDD 3.5 cm    IVSd 1.4 cm    LVIDd 3.9 cm    LVIDs 2.2 cm    LVPWd 1.2 cm    IVS/LVPW 1.1     FS 44.4 %    EDV(Teich) 64.7 ml    ESV(Teich) 15.3 ml    EF(Teich) 76.4 %    EDV(cubed) 58.0 ml    ESV(cubed) 9.9 ml     EF(cubed) 82.9 %    LV mass(C)d 170.5 grams    LV mass(C)dI 80.4 grams/m^2    SV(Teich) 49.4 ml    SI(Teich) 23.3 ml/m^2    SV(cubed) 48.0 ml    SI(cubed) 22.6 ml/m^2    LA dimension 3.0 cm    LVLd ap4 8.3 cm    EDV(MOD-sp4) 104.0 ml    LVLs ap4 6.1 cm    ESV(MOD-sp4) 38.2 ml    EF(MOD-sp4) 63.3 %    SV(MOD-sp4) 65.8 ml    SI(MOD-sp4) 31.0 ml/m^2    LV Stone Vol (BSA corrected) 49.0 ml/m^2    LV Sys Vol (BSA corrected) 18.0 ml/m^2    Ao pk brendan 100.0 cm/sec    Ao max PG 4.0 mmHg    Ao max PG (full) 2.4 mmHg    Ao V2 mean 72.6 cm/sec    Ao mean PG 2.0 mmHg    Ao mean PG (full) 1.0 mmHg    Ao V2 VTI 21.7 cm    LV V1 max PG 1.6 mmHg    LV V1 mean PG 1.0 mmHg    LV V1 max 62.8 cm/sec    LV V1 mean 42.9 cm/sec    LV V1 VTI 15.3 cm    TR max brendan 290.0 cm/sec    RVSP(TR) 38.6 mmHg    RAP systole 5.0 mmHg     CV ECHO PRIYANK - BZI_BMI 31.5 kilograms/m^2     CV ECHO PRIYANK - BSA(HAYCOCK) 2.2 m^2     CV ECHO PRIYANK - BZI_METRIC_WEIGHT 96.6 kg     CV ECHO PRIYANK - BZI_METRIC_HEIGHT 175.3 cm    Target HR (85%) 119 bpm    Max. Pred. HR (100%) 140 bpm    Echo EF Estimated 65 %      CAROTID DUPLEX: IMPRESSION:  Impression:  1. There is less than 50% stenosis of the right internal carotid artery.  2. There is less than 50% stenosis of the left internal carotid artery.  3. Antegrade flow is demonstrated in bilateral vertebral arteries.      CT HEAD: IMPRESSION:  Changes of aging with no acute intracranial abnormality      MRI BRAIN: IMPRESSION:  1. Ischemic infarction of right basal ganglia  extending to the right  paraventricular region in the right hemisphere       2DECHO: Interpretation Summary   · Left ventricular wall thickness is consistent with mild-to-moderate concentric hypertrophy.  · Left ventricular systolic function is normal. Estimated EF = 65%.  · Right ventricular cavity is moderate-to-severely dilated.  · Severely reduced right ventricular systolic function noted.       MARVA CARVAJAL STUDY: IMPRESSION:  Impression:  There is evidence of deep venous thrombosis in both lower  extremities        ASSESSMENT/PLAN    Diagnoses and all orders for this visit:    Cerebrovascular accident (CVA) due to thrombosis of right middle cerebral artery (CMS/HCC)    Bilateral pulmonary embolism (CMS/HCC)    Acute deep vein thrombosis (DVT) of femoral vein of both lower extremities (CMS/HCC)    Type 2 diabetes mellitus with hyperglycemia, with long-term current use of insulin (CMS/HCC)    Parkinsonism, unspecified Parkinsonism type (CMS/[facility])    Shortness of breath    B12 deficiency    Other orders  -     Cyanocobalamin 1000 MCG/ML kit; Inject 1,000 mcg into the appropriate muscle as directed by prescriber Daily.      MEDICAL DECISION MAKIN. continue with eliquis 5 mg BID for PE and DVT. Patient has follow up with Dr. Bartlett 2019 for f/u on DVT  2. Continue with plavix 75 mg for now. I did discuss with patient/wife that with dual therapy at increase risk of bleeding.  3. Continue with statin for LDL goal less than 70  4. DM management per PCP for A1C goal less than 6.5  5. Continue sinemet 25/100 2 tablet TID for now.   6. Patient SOB and did improve when oxygen 2L pnc applied. I do hear congestion on right side. Recommend wife take patient to the ED to be evaluated given his recent history of events.  7. Continue PT/OT at NH  8. Supportive care  9. Continue with oral B12 replacement per PCP.  10. Current outpatient and discharge medications have been reconciled for the patient.  Reviewed by: BRENNA Mcdowell     allergies and all known medications/prescriptions have been reviewed using resources available on this encounter.    Return in about 3 months (around 2019).        BRENNA Cm

## 2018-11-07 NOTE — ED NOTES
TALKED WITH [facility] AND THEY ARE COMING TO PICK PATIENT UP.       Margo Love, RN  11/07/18 2016

## 2019-02-27 ENCOUNTER — TELEPHONE (OUTPATIENT)
Dept: NEUROLOGY | Facility: CLINIC | Age: 79
End: 2019-02-27

## 2019-04-01 ENCOUNTER — TELEPHONE (OUTPATIENT)
Dept: PULMONOLOGY | Facility: CLINIC | Age: 79
End: 2019-04-01

## 2019-04-01 NOTE — TELEPHONE ENCOUNTER
Attempted to call the patient to set up testing. However, there was no answer. At this time, I will close out the order.

## 2019-05-02 ENCOUNTER — TELEPHONE (OUTPATIENT)
Dept: VASCULAR SURGERY | Facility: CLINIC | Age: 79
End: 2019-05-02

## 2019-05-02 NOTE — TELEPHONE ENCOUNTER
I called to talk to the patient to let them know that I had to reschedule the appt.  His wife answered her cell phone after the other number didn't work, and said that the patient was on hospice.  I was going to let them know that I would just cancel these appts, but she wanted to talk to the nurse about it.  She is going to have the nurse to call me back this afternoon.

## 2019-05-22 ENCOUNTER — APPOINTMENT (OUTPATIENT)
Dept: ULTRASOUND IMAGING | Facility: HOSPITAL | Age: 79
End: 2019-05-22

## 2019-07-01 RX ORDER — BISACODYL 10 MG
SUPPOSITORY, RECTAL RECTAL
Qty: 5 SUPPOSITORY | Refills: 0 | Status: SHIPPED | OUTPATIENT
Start: 2019-07-01

## 2019-07-10 ENCOUNTER — HOSPITAL ENCOUNTER (OUTPATIENT)
Age: 79
Setting detail: SPECIMEN
Discharge: HOME | End: 2019-07-10
Payer: MEDICARE

## 2019-07-10 LAB
BACTERIA: ABNORMAL /HPF
BILIRUBIN URINE: NEGATIVE
BLOOD, URINE: ABNORMAL
CLARITY: ABNORMAL
COLOR: YELLOW
EPITHELIAL CELLS, UA: 1 /HPF (ref 0–5)
GLUCOSE URINE: 100 MG/DL
HYALINE CASTS: >87 /HPF (ref 0–8)
KETONES, URINE: NEGATIVE MG/DL
LEUKOCYTE ESTERASE, URINE: ABNORMAL
NITRITE, URINE: POSITIVE
PH UA: 8.5 (ref 5–8)
PROTEIN UA: 300 MG/DL
RBC UA: 190 /HPF (ref 0–4)
SPECIFIC GRAVITY UA: 1.02 (ref 1–1.03)
URINE REFLEX TO CULTURE: YES
UROBILINOGEN, URINE: 0.2 E.U./DL
WBC UA: 313 /HPF (ref 0–5)

## 2019-07-10 PROCEDURE — 87086 URINE CULTURE/COLONY COUNT: CPT

## 2019-07-10 PROCEDURE — 87077 CULTURE AEROBIC IDENTIFY: CPT

## 2019-07-10 PROCEDURE — 87186 SC STD MICRODIL/AGAR DIL: CPT

## 2019-07-10 PROCEDURE — 81001 URINALYSIS AUTO W/SCOPE: CPT

## 2019-07-12 LAB
ORGANISM: ABNORMAL
URINE CULTURE, ROUTINE: ABNORMAL
URINE CULTURE, ROUTINE: ABNORMAL

## 2019-09-26 ENCOUNTER — TELEPHONE (OUTPATIENT)
Dept: INTERNAL MEDICINE CLINIC | Age: 79
End: 2019-09-26

## 2019-09-30 ENCOUNTER — TELEPHONE (OUTPATIENT)
Dept: INTERNAL MEDICINE CLINIC | Age: 79
End: 2019-09-30